# Patient Record
Sex: MALE | Race: WHITE | NOT HISPANIC OR LATINO | Employment: FULL TIME | URBAN - METROPOLITAN AREA
[De-identification: names, ages, dates, MRNs, and addresses within clinical notes are randomized per-mention and may not be internally consistent; named-entity substitution may affect disease eponyms.]

---

## 2017-01-09 ENCOUNTER — TRANSCRIBE ORDERS (OUTPATIENT)
Dept: ADMINISTRATIVE | Facility: HOSPITAL | Age: 62
End: 2017-01-09

## 2017-01-09 ENCOUNTER — HOSPITAL ENCOUNTER (OUTPATIENT)
Dept: RADIOLOGY | Facility: HOSPITAL | Age: 62
Discharge: HOME | End: 2017-01-09
Payer: COMMERCIAL

## 2017-01-09 DIAGNOSIS — J20.9 ACUTE BRONCHITIS, UNSPECIFIED ORGANISM: ICD-10-CM

## 2017-01-09 DIAGNOSIS — J20.9 ACUTE BRONCHITIS, UNSPECIFIED ORGANISM: Primary | ICD-10-CM

## 2017-01-09 PROCEDURE — 71020 HB CHEST X-RAY 2VW FRONTAL&LATL: CPT

## 2019-11-18 ENCOUNTER — APPOINTMENT (EMERGENCY)
Dept: CT IMAGING | Facility: HOSPITAL | Age: 64
DRG: 176 | End: 2019-11-18
Payer: COMMERCIAL

## 2019-11-18 ENCOUNTER — HOSPITAL ENCOUNTER (INPATIENT)
Facility: HOSPITAL | Age: 64
LOS: 1 days | Discharge: HOME | DRG: 176 | End: 2019-11-19
Attending: EMERGENCY MEDICINE | Admitting: INTERNAL MEDICINE
Payer: COMMERCIAL

## 2019-11-18 ENCOUNTER — APPOINTMENT (EMERGENCY)
Dept: RADIOLOGY | Facility: HOSPITAL | Age: 64
DRG: 176 | End: 2019-11-18
Payer: COMMERCIAL

## 2019-11-18 ENCOUNTER — APPOINTMENT (EMERGENCY)
Dept: ULTRASOUND IMAGING | Facility: HOSPITAL | Age: 64
DRG: 176 | End: 2019-11-18
Payer: COMMERCIAL

## 2019-11-18 DIAGNOSIS — I82.409 DVT (DEEP VENOUS THROMBOSIS) (HCC): Primary | ICD-10-CM

## 2019-11-18 DIAGNOSIS — I26.99 PULMONARY EMBOLISM (HCC): ICD-10-CM

## 2019-11-18 DIAGNOSIS — I26.99 ACUTE PULMONARY EMBOLISM (HCC): ICD-10-CM

## 2019-11-18 PROBLEM — I82.402 ACUTE DEEP VEIN THROMBOSIS (DVT) OF LEFT LOWER EXTREMITY (HCC): Status: ACTIVE | Noted: 2019-11-18

## 2019-11-18 PROBLEM — R07.9 CHEST PAIN: Status: ACTIVE | Noted: 2019-11-18

## 2019-11-18 PROBLEM — I10 HYPERTENSION: Status: ACTIVE | Noted: 2019-11-18

## 2019-11-18 PROBLEM — E78.5 HYPERLIPIDEMIA: Status: ACTIVE | Noted: 2019-11-18

## 2019-11-18 LAB
ALBUMIN SERPL BCP-MCNC: 3.3 G/DL (ref 3.5–5)
ALP SERPL-CCNC: 85 U/L (ref 46–116)
ALT SERPL W P-5'-P-CCNC: 29 U/L (ref 12–78)
ANION GAP SERPL CALCULATED.3IONS-SCNC: 9 MMOL/L (ref 4–13)
APTT PPP: 28 SECONDS (ref 23–37)
AST SERPL W P-5'-P-CCNC: 18 U/L (ref 5–45)
ATRIAL RATE: 75 BPM
BASOPHILS # BLD AUTO: 0.04 THOUSANDS/ΜL (ref 0–0.1)
BASOPHILS NFR BLD AUTO: 1 % (ref 0–1)
BILIRUB SERPL-MCNC: 0.2 MG/DL (ref 0.2–1)
BUN SERPL-MCNC: 19 MG/DL (ref 5–25)
CALCIUM SERPL-MCNC: 8.7 MG/DL (ref 8.3–10.1)
CHLORIDE SERPL-SCNC: 105 MMOL/L (ref 100–108)
CO2 SERPL-SCNC: 28 MMOL/L (ref 21–32)
CREAT SERPL-MCNC: 0.91 MG/DL (ref 0.6–1.3)
EOSINOPHIL # BLD AUTO: 0.16 THOUSAND/ΜL (ref 0–0.61)
EOSINOPHIL NFR BLD AUTO: 3 % (ref 0–6)
ERYTHROCYTE [DISTWIDTH] IN BLOOD BY AUTOMATED COUNT: 12.4 % (ref 11.6–15.1)
GFR SERPL CREATININE-BSD FRML MDRD: 91 ML/MIN/1.73SQ M
GLUCOSE SERPL-MCNC: 113 MG/DL (ref 65–140)
HCT VFR BLD AUTO: 45 % (ref 36.5–49.3)
HGB BLD-MCNC: 14.9 G/DL (ref 12–17)
IMM GRANULOCYTES # BLD AUTO: 0.02 THOUSAND/UL (ref 0–0.2)
IMM GRANULOCYTES NFR BLD AUTO: 0 % (ref 0–2)
INR PPP: 0.92 (ref 0.84–1.19)
LYMPHOCYTES # BLD AUTO: 1.68 THOUSANDS/ΜL (ref 0.6–4.47)
LYMPHOCYTES NFR BLD AUTO: 32 % (ref 14–44)
MCH RBC QN AUTO: 30.6 PG (ref 26.8–34.3)
MCHC RBC AUTO-ENTMCNC: 33.1 G/DL (ref 31.4–37.4)
MCV RBC AUTO: 92 FL (ref 82–98)
MONOCYTES # BLD AUTO: 0.58 THOUSAND/ΜL (ref 0.17–1.22)
MONOCYTES NFR BLD AUTO: 11 % (ref 4–12)
NEUTROPHILS # BLD AUTO: 2.74 THOUSANDS/ΜL (ref 1.85–7.62)
NEUTS SEG NFR BLD AUTO: 53 % (ref 43–75)
NRBC BLD AUTO-RTO: 0 /100 WBCS
P AXIS: 41 DEGREES
PLATELET # BLD AUTO: 162 THOUSANDS/UL (ref 149–390)
PMV BLD AUTO: 10.4 FL (ref 8.9–12.7)
POTASSIUM SERPL-SCNC: 4.2 MMOL/L (ref 3.5–5.3)
PR INTERVAL: 202 MS
PROT SERPL-MCNC: 6.8 G/DL (ref 6.4–8.2)
PROTHROMBIN TIME: 11.8 SECONDS (ref 11.6–14.5)
QRS AXIS: 35 DEGREES
QRSD INTERVAL: 90 MS
QT INTERVAL: 364 MS
QTC INTERVAL: 406 MS
RBC # BLD AUTO: 4.87 MILLION/UL (ref 3.88–5.62)
SODIUM SERPL-SCNC: 142 MMOL/L (ref 136–145)
T WAVE AXIS: 27 DEGREES
TROPONIN I SERPL-MCNC: <0.02 NG/ML
VENTRICULAR RATE: 75 BPM
WBC # BLD AUTO: 5.22 THOUSAND/UL (ref 4.31–10.16)

## 2019-11-18 PROCEDURE — 36415 COLL VENOUS BLD VENIPUNCTURE: CPT | Performed by: EMERGENCY MEDICINE

## 2019-11-18 PROCEDURE — 93970 EXTREMITY STUDY: CPT | Performed by: SURGERY

## 2019-11-18 PROCEDURE — 84484 ASSAY OF TROPONIN QUANT: CPT | Performed by: NURSE PRACTITIONER

## 2019-11-18 PROCEDURE — 71275 CT ANGIOGRAPHY CHEST: CPT

## 2019-11-18 PROCEDURE — 80053 COMPREHEN METABOLIC PANEL: CPT | Performed by: EMERGENCY MEDICINE

## 2019-11-18 PROCEDURE — 99285 EMERGENCY DEPT VISIT HI MDM: CPT

## 2019-11-18 PROCEDURE — 99223 1ST HOSP IP/OBS HIGH 75: CPT | Performed by: NURSE PRACTITIONER

## 2019-11-18 PROCEDURE — 71046 X-RAY EXAM CHEST 2 VIEWS: CPT

## 2019-11-18 PROCEDURE — 93010 ELECTROCARDIOGRAM REPORT: CPT | Performed by: INTERNAL MEDICINE

## 2019-11-18 PROCEDURE — 84484 ASSAY OF TROPONIN QUANT: CPT | Performed by: EMERGENCY MEDICINE

## 2019-11-18 PROCEDURE — 85730 THROMBOPLASTIN TIME PARTIAL: CPT | Performed by: EMERGENCY MEDICINE

## 2019-11-18 PROCEDURE — 85610 PROTHROMBIN TIME: CPT | Performed by: EMERGENCY MEDICINE

## 2019-11-18 PROCEDURE — 93005 ELECTROCARDIOGRAM TRACING: CPT

## 2019-11-18 PROCEDURE — 93970 EXTREMITY STUDY: CPT

## 2019-11-18 PROCEDURE — 99283 EMERGENCY DEPT VISIT LOW MDM: CPT | Performed by: EMERGENCY MEDICINE

## 2019-11-18 PROCEDURE — 85025 COMPLETE CBC W/AUTO DIFF WBC: CPT | Performed by: EMERGENCY MEDICINE

## 2019-11-18 RX ORDER — CHLORAL HYDRATE 500 MG
1000 CAPSULE ORAL DAILY
COMMUNITY

## 2019-11-18 RX ORDER — HEPARIN SODIUM 1000 [USP'U]/ML
6800 INJECTION, SOLUTION INTRAVENOUS; SUBCUTANEOUS ONCE
Status: COMPLETED | OUTPATIENT
Start: 2019-11-18 | End: 2019-11-18

## 2019-11-18 RX ORDER — ASPIRIN 325 MG
325 TABLET ORAL ONCE
Status: COMPLETED | OUTPATIENT
Start: 2019-11-18 | End: 2019-11-18

## 2019-11-18 RX ORDER — HEPARIN SODIUM 1000 [USP'U]/ML
6800 INJECTION, SOLUTION INTRAVENOUS; SUBCUTANEOUS AS NEEDED
Status: DISCONTINUED | OUTPATIENT
Start: 2019-11-18 | End: 2019-11-19

## 2019-11-18 RX ORDER — MUSCLE RUB CREAM 100; 150 MG/G; MG/G
CREAM TOPICAL 4 TIMES DAILY PRN
Status: DISCONTINUED | OUTPATIENT
Start: 2019-11-18 | End: 2019-11-19 | Stop reason: HOSPADM

## 2019-11-18 RX ORDER — ONDANSETRON 2 MG/ML
4 INJECTION INTRAMUSCULAR; INTRAVENOUS EVERY 6 HOURS PRN
Status: DISCONTINUED | OUTPATIENT
Start: 2019-11-18 | End: 2019-11-19 | Stop reason: HOSPADM

## 2019-11-18 RX ORDER — CHLORAL HYDRATE 500 MG
1000 CAPSULE ORAL DAILY
Status: DISCONTINUED | OUTPATIENT
Start: 2019-11-19 | End: 2019-11-19 | Stop reason: HOSPADM

## 2019-11-18 RX ORDER — HEPARIN SODIUM 1000 [USP'U]/ML
3400 INJECTION, SOLUTION INTRAVENOUS; SUBCUTANEOUS AS NEEDED
Status: DISCONTINUED | OUTPATIENT
Start: 2019-11-18 | End: 2019-11-19

## 2019-11-18 RX ORDER — ACETAMINOPHEN 325 MG/1
650 TABLET ORAL EVERY 6 HOURS PRN
Status: DISCONTINUED | OUTPATIENT
Start: 2019-11-18 | End: 2019-11-19 | Stop reason: HOSPADM

## 2019-11-18 RX ORDER — HEPARIN SODIUM 10000 [USP'U]/100ML
3-30 INJECTION, SOLUTION INTRAVENOUS
Status: DISCONTINUED | OUTPATIENT
Start: 2019-11-18 | End: 2019-11-19

## 2019-11-18 RX ADMIN — IOHEXOL 100 ML: 350 INJECTION, SOLUTION INTRAVENOUS at 17:27

## 2019-11-18 RX ADMIN — ASPIRIN 325 MG ORAL TABLET 325 MG: 325 PILL ORAL at 14:54

## 2019-11-18 RX ADMIN — HEPARIN SODIUM 18 UNITS/KG/HR: 10000 INJECTION, SOLUTION INTRAVENOUS at 19:38

## 2019-11-18 RX ADMIN — HEPARIN SODIUM 6800 UNITS: 1000 INJECTION INTRAVENOUS; SUBCUTANEOUS at 19:37

## 2019-11-18 NOTE — ED PROVIDER NOTES
History  Chief Complaint   Patient presents with    Chest Pain     Substernal chest pain starting last week  Denies radiation  Denies chest pain at  this time   Leg Pain     Bilateral leg pain for over a year  HPI     42-year-old male with history of hypertension and hyperlipidemia sent in by his PCP for substernal chest pain that started last week  Pain is described as substernal, burning pressure sensation  Occurs with exertion  Also accompanied by some dyspnea that also occurs with exertion, not present at rest   Denies abdominal pain  Chest pain radiates to the jaw and left arm  No personal early family history of CAD  Patient also reports pain in his bilateral calves that has been present for the last year  It is nonexertional, gets better with walking around, occasionally radiates the bilateral thighs  Also accompanied by swelling in his calves it is intermittent, comes and goes  He was briefly on a diuretic earlier in the year was taken off of it by his doctor  No history of DVT or PE  No recent prolonged immobilization  Prior to Admission Medications   Prescriptions Last Dose Informant Patient Reported? Taking? Omega-3 Fatty Acids (FISH OIL) 1,000 mg   Yes Yes   Sig: Take 1,000 mg by mouth daily      Facility-Administered Medications: None       History reviewed  No pertinent past medical history  Past Surgical History:   Procedure Laterality Date    ROTATOR CUFF REPAIR      SINUS SURGERY         History reviewed  No pertinent family history  I have reviewed and agree with the history as documented  Social History     Tobacco Use    Smoking status: Former Smoker    Smokeless tobacco: Never Used   Substance Use Topics    Alcohol use: Yes     Comment: rare    Drug use: Never        Review of Systems   Constitutional: Negative for chills and fever  HENT: Negative for congestion  Eyes: Negative for visual disturbance     Respiratory: Positive for shortness of breath (with exertion)  Negative for cough  Cardiovascular: Positive for chest pain and leg swelling  Gastrointestinal: Negative for abdominal pain, diarrhea, nausea and vomiting  Genitourinary: Negative for dysuria and frequency  Musculoskeletal: Positive for myalgias (bilateral legs)  Negative for arthralgias, back pain, neck pain and neck stiffness  Skin: Negative for rash  Neurological: Negative for weakness, numbness and headaches  Psychiatric/Behavioral: Negative for agitation, behavioral problems and confusion  Physical Exam  Physical Exam   Constitutional: He is oriented to person, place, and time  He appears well-developed and well-nourished  No distress  HENT:   Head: Normocephalic and atraumatic  Right Ear: External ear normal    Left Ear: External ear normal    Nose: Nose normal    Mouth/Throat: Oropharynx is clear and moist    Eyes: Conjunctivae are normal    Neck: Normal range of motion  Neck supple  Cardiovascular: Normal rate, regular rhythm, normal heart sounds and intact distal pulses  Exam reveals no gallop and no friction rub  No murmur heard  Pulmonary/Chest: Effort normal and breath sounds normal  No respiratory distress  He has no wheezes  He has no rales  Abdominal: Soft  Bowel sounds are normal  He exhibits no distension  There is no tenderness  There is no guarding  Musculoskeletal: Normal range of motion  He exhibits no edema (No calf swelling or swelling to the bilateral LEs) or deformity  TTP to the bilateral calves, no overlying skin changes   Neurological: He is alert and oriented to person, place, and time  He exhibits normal muscle tone  Skin: Skin is warm and dry  He is not diaphoretic         Vital Signs  ED Triage Vitals   Temperature Pulse Respirations Blood Pressure SpO2   11/18/19 1417 11/18/19 1416 11/18/19 1416 11/18/19 1416 11/18/19 1416   98 2 °F (36 8 °C) 73 18 153/76 98 %      Temp Source Heart Rate Source Patient Position - Orthostatic VS BP Location FiO2 (%)   11/18/19 1417 11/18/19 1416 11/18/19 1416 11/18/19 1416 --   Oral Monitor Sitting Right arm       Pain Score       11/18/19 1416       7           Vitals:    11/18/19 1712 11/18/19 1824 11/18/19 1900 11/18/19 2215   BP: 125/71 163/72 130/66 135/71   Pulse: 71 77 72 68   Patient Position - Orthostatic VS:   Lying Lying         Visual Acuity      ED Medications  Medications   heparin (porcine) 25,000 units in 250 mL infusion (premix) ( Intravenous Canceled Entry 11/18/19 1943)   heparin (porcine) injection 6,800 Units (has no administration in time range)   heparin (porcine) injection 3,400 Units (has no administration in time range)   fish oil capsule 1,000 mg (has no administration in time range)   ondansetron (ZOFRAN) injection 4 mg (has no administration in time range)   acetaminophen (TYLENOL) tablet 650 mg (has no administration in time range)   menthol-methyl salicylate (BENGAY) 94-73 % cream (has no administration in time range)   aspirin tablet 325 mg (325 mg Oral Given 11/18/19 1454)   iohexol (OMNIPAQUE) 350 MG/ML injection (MULTI-DOSE) 100 mL (100 mL Intravenous Given 11/18/19 1727)   heparin (porcine) injection 6,800 Units (6,800 Units Intravenous Given 11/18/19 1937)       Diagnostic Studies  Results Reviewed     Procedure Component Value Units Date/Time    APTT [[id number]]  (Normal) Collected:  11/18/19 1827    Lab Status:  Final result Specimen:  Blood from Arm, Right Updated:  11/18/19 1853     PTT 28 seconds     Protime-INR [[id number]]  (Normal) Collected:  11/18/19 1827    Lab Status:  Final result Specimen:  Blood from Arm, Right Updated:  11/18/19 1853     Protime 11 8 seconds      INR 0 92    Troponin I [[id number]]  (Normal) Collected:  11/18/19 1749    Lab Status:  Final result Specimen:  Blood from Arm, Left Updated:  11/18/19 1815     Troponin I <0 02 ng/mL     Troponin I [[id number]]  (Normal) Collected:  11/18/19 1502    Lab Status:  Final result Specimen:  Blood from Arm, Right Updated:  11/18/19 1533     Troponin I <0 02 ng/mL     Comprehensive metabolic panel [[id number]]  (Abnormal) Collected:  11/18/19 1502    Lab Status:  Final result Specimen:  Blood from Arm, Right Updated:  11/18/19 1531     Sodium 142 mmol/L      Potassium 4 2 mmol/L      Chloride 105 mmol/L      CO2 28 mmol/L      ANION GAP 9 mmol/L      BUN 19 mg/dL      Creatinine 0 91 mg/dL      Glucose 113 mg/dL      Calcium 8 7 mg/dL      AST 18 U/L      ALT 29 U/L      Alkaline Phosphatase 85 U/L      Total Protein 6 8 g/dL      Albumin 3 3 g/dL      Total Bilirubin 0 20 mg/dL      eGFR 91 ml/min/1 73sq m     Narrative:       [place] guidelines for Chronic Kidney Disease (CKD):     Stage 1 with normal or high GFR (GFR > 90 mL/min/1 73 square meters)    Stage 2 Mild CKD (GFR = 60-89 mL/min/1 73 square meters)    Stage 3A Moderate CKD (GFR = 45-59 mL/min/1 73 square meters)    Stage 3B Moderate CKD (GFR = 30-44 mL/min/1 73 square meters)    Stage 4 Severe CKD (GFR = 15-29 mL/min/1 73 square meters)    Stage 5 End Stage CKD (GFR <15 mL/min/1 73 square meters)  Note: GFR calculation is accurate only with a steady state creatinine    CBC and differential [[id number]] Collected:  11/18/19 1502    Lab Status:  Final result Specimen:  Blood from Arm, Right Updated:  11/18/19 1509     WBC 5 22 Thousand/uL      RBC 4 87 Million/uL      Hemoglobin 14 9 g/dL      Hematocrit 45 0 %      MCV 92 fL      MCH 30 6 pg      MCHC 33 1 g/dL      RDW 12 4 %      MPV 10 4 fL      Platelets 004 Thousands/uL      nRBC 0 /100 WBCs      Neutrophils Relative 53 %      Immat GRANS % 0 %      Lymphocytes Relative 32 %      Monocytes Relative 11 %      Eosinophils Relative 3 %      Basophils Relative 1 %      Neutrophils Absolute 2 74 Thousands/µL      Immature Grans Absolute 0 02 Thousand/uL      Lymphocytes Absolute 1 68 Thousands/µL      Monocytes Absolute 0 58 Thousand/µL      Eosinophils Absolute 0 16 Thousand/µL Basophils Absolute 0 04 Thousands/µL                  CTA ED chest PE Study   Final Result by Dennys Cummins MD (11/18 9794)      Nonocclusive pulmonary emboli identified within right lower lobe and lingular segmental branches  No findings of right heart strain  I personally discussed this study with Nguyen Bowman on 11/18/2019 at 5:54 PM                Workstation performed: [id number]         VAS lower limb venous duplex study, complete bilateral   Final Result by Rajiv Leroy MD (11/18 1938)      XR chest 2 views   ED Interpretation by Grant Bowers MD (11/18 1516)   No acute cardiopulmonary abnormalities      Final Result by Kranthi Crowe MD (11/18 1541)      No acute cardiopulmonary disease  Workstation performed: [id number]                    Procedures  CriticalCare Time  Performed by: Grant Bowers MD  Authorized by: Grant Bowers MD     Critical care provider statement:     Critical care time (minutes):  35    Critical care was necessary to treat or prevent imminent or life-threatening deterioration of the following conditions: PE requiring heparin drip      Critical care was time spent personally by me on the following activities:  Ordering and review of laboratory studies, ordering and review of radiographic studies, re-evaluation of patient's condition, examination of patient, development of treatment plan with patient or surrogate and ordering and performing treatments and interventions           ED Course                         Wells' Criteria for PE      Most Recent Value   Wells' Criteria for PE   Clinical signs and symptoms of DVT  3 Filed at: 11/18/2019 1620   PE is primary diagnosis or equally likely  3 Filed at: 11/18/2019 1620   HR >100  0 Filed at: 11/18/2019 1620   Immobilization at least 3 days or Surgery in the previous 4 weeks  0 Filed at: 11/18/2019 1620   Previous, objectively diagnosed PE or DVT  1 5 Filed at: 11/18/2019 1620   Hemoptysis  0 Filed at: 11/18/2019 1620   Malignancy with treatment within 6 months or palliative  0 Filed at: 11/18/2019 1620   Wells' Criteria Total  7 5 Filed at: 11/18/2019 1620            [facility]  Number of Diagnoses or Management Options  DVT (deep venous thrombosis) [facility]): new and requires workup  Pulmonary embolism [facility]): new and requires workup  Diagnosis management comments: Generally well appearing  Afebrile and hemodynamically stable  I personally interpreted the patient's EKG, which reveals normal rate, normal sinus rhythm, normal axis, normal intervals, T-wave abnormality in lead 3 with no prior available for comparison  Initial troponin undetectable  Medium risk by HEARTscore  Patient was found to have a DVT to his left calf, at which point CTA of the chest was obtained that shows bilateral nonocclusive subsegmental PEs without evidence of right heart strain  Will start on heparin and admit for chest pain that is now most likely secondary to pulmonary embolism  Patient admitted in stable condition  DVT and PEs appear to be unprovoked         Amount and/or Complexity of Data Reviewed  Clinical lab tests: ordered and reviewed  Tests in the radiology section of CPT®: ordered and reviewed  Independent visualization of images, tracings, or specimens: yes    Patient Progress  Patient progress: stable         Disposition  Final diagnoses:   DVT (deep venous thrombosis) ([facility] [address] )   Pulmonary embolism ([facility] [address] )     Time reflects when diagnosis was documented in both MDM as applicable and the Disposition within this note     Time User Action Codes Description Comment    11/18/2019  6:08 PM Derinda Furth Add [O22 30] DVT (deep vein thrombosis) in pregnancy     11/18/2019  6:08 PM Derinda Furth Remove [O22 30] DVT (deep vein thrombosis) in pregnancy     11/18/2019  6:08 PM Derinda Furth Add [I82 409] DVT (deep venous thrombosis) ([facility] [address] )     11/18/2019  6:08 PM Derinda Furth Add [I26 99] Pulmonary embolism [facility])       ED Disposition     ED Disposition Condition Date/Time Comment    Admit Stable Mon Nov 18, 2019  6:08 PM Case was discussed with EDU and the patient's admission status was agreed to be Admission Status: inpatient status to the service of Dr Charlette Coulter  Follow-up Information    None         Current Discharge Medication List      CONTINUE these medications which have NOT CHANGED    Details   Omega-3 Fatty Acids (FISH OIL) 1,000 mg Take 1,000 mg by mouth daily           No discharge procedures on file      ED Provider  Electronically Signed by           Drew Zhong MD  11/18/19 9427

## 2019-11-19 VITALS
TEMPERATURE: 98.3 F | WEIGHT: 184 LBS | BODY MASS INDEX: 27.25 KG/M2 | HEIGHT: 69 IN | SYSTOLIC BLOOD PRESSURE: 134 MMHG | RESPIRATION RATE: 18 BRPM | DIASTOLIC BLOOD PRESSURE: 62 MMHG | HEART RATE: 72 BPM | OXYGEN SATURATION: 95 %

## 2019-11-19 PROBLEM — R07.9 CHEST PAIN: Status: RESOLVED | Noted: 2019-11-18 | Resolved: 2019-11-19

## 2019-11-19 LAB
ANION GAP SERPL CALCULATED.3IONS-SCNC: 9 MMOL/L (ref 4–13)
APTT PPP: 144 SECONDS (ref 23–37)
APTT PPP: 73 SECONDS (ref 23–37)
APTT PPP: 78 SECONDS (ref 23–37)
BASOPHILS # BLD AUTO: 0.04 THOUSANDS/ΜL (ref 0–0.1)
BASOPHILS NFR BLD AUTO: 1 % (ref 0–1)
BUN SERPL-MCNC: 18 MG/DL (ref 5–25)
CALCIUM SERPL-MCNC: 9 MG/DL (ref 8.3–10.1)
CHLORIDE SERPL-SCNC: 106 MMOL/L (ref 100–108)
CO2 SERPL-SCNC: 26 MMOL/L (ref 21–32)
CREAT SERPL-MCNC: 0.96 MG/DL (ref 0.6–1.3)
EOSINOPHIL # BLD AUTO: 0.19 THOUSAND/ΜL (ref 0–0.61)
EOSINOPHIL NFR BLD AUTO: 3 % (ref 0–6)
ERYTHROCYTE [DISTWIDTH] IN BLOOD BY AUTOMATED COUNT: 12.4 % (ref 11.6–15.1)
GFR SERPL CREATININE-BSD FRML MDRD: 85 ML/MIN/1.73SQ M
GLUCOSE SERPL-MCNC: 102 MG/DL (ref 65–140)
HCT VFR BLD AUTO: 43.2 % (ref 36.5–49.3)
HGB BLD-MCNC: 14.2 G/DL (ref 12–17)
IMM GRANULOCYTES # BLD AUTO: 0.03 THOUSAND/UL (ref 0–0.2)
IMM GRANULOCYTES NFR BLD AUTO: 1 % (ref 0–2)
LYMPHOCYTES # BLD AUTO: 2.48 THOUSANDS/ΜL (ref 0.6–4.47)
LYMPHOCYTES NFR BLD AUTO: 41 % (ref 14–44)
MCH RBC QN AUTO: 30.5 PG (ref 26.8–34.3)
MCHC RBC AUTO-ENTMCNC: 32.9 G/DL (ref 31.4–37.4)
MCV RBC AUTO: 93 FL (ref 82–98)
MONOCYTES # BLD AUTO: 0.67 THOUSAND/ΜL (ref 0.17–1.22)
MONOCYTES NFR BLD AUTO: 11 % (ref 4–12)
NEUTROPHILS # BLD AUTO: 2.68 THOUSANDS/ΜL (ref 1.85–7.62)
NEUTS SEG NFR BLD AUTO: 43 % (ref 43–75)
NRBC BLD AUTO-RTO: 0 /100 WBCS
PLATELET # BLD AUTO: 143 THOUSANDS/UL (ref 149–390)
PMV BLD AUTO: 10.3 FL (ref 8.9–12.7)
POTASSIUM SERPL-SCNC: 3.9 MMOL/L (ref 3.5–5.3)
RBC # BLD AUTO: 4.65 MILLION/UL (ref 3.88–5.62)
SODIUM SERPL-SCNC: 141 MMOL/L (ref 136–145)
WBC # BLD AUTO: 6.09 THOUSAND/UL (ref 4.31–10.16)

## 2019-11-19 PROCEDURE — 85730 THROMBOPLASTIN TIME PARTIAL: CPT | Performed by: INTERNAL MEDICINE

## 2019-11-19 PROCEDURE — 80048 BASIC METABOLIC PNL TOTAL CA: CPT | Performed by: INTERNAL MEDICINE

## 2019-11-19 PROCEDURE — 99239 HOSP IP/OBS DSCHRG MGMT >30: CPT | Performed by: INTERNAL MEDICINE

## 2019-11-19 PROCEDURE — 85025 COMPLETE CBC W/AUTO DIFF WBC: CPT | Performed by: INTERNAL MEDICINE

## 2019-11-19 RX ADMIN — HEPARIN SODIUM 15.06 UNITS/KG/HR: 10000 INJECTION, SOLUTION INTRAVENOUS at 15:57

## 2019-11-19 RX ADMIN — Medication 1000 MG: at 08:12

## 2019-11-19 RX ADMIN — RIVAROXABAN 15 MG: 15 TABLET, FILM COATED ORAL at 16:16

## 2019-11-19 NOTE — ASSESSMENT & PLAN NOTE
· Patient presented to the ER from outpatient PCP office due to complaints of bilateral leg pain, substernal chest pain, and shortness of breath  Patient reports that he has had bilateral leg pain for about 1 year  Additionally, he reports complaints of intermittent chest pain that got increasingly worse this past week  He denies any radiation of the chest pain  He reports shortness of breath episodes intermittently, and reports that his dyspnea is worse with exertion  He reports a history of high blood pressure and elevated cholesterol, however, he is controlling them with lifestyle changes and fish oil daily  He denies any recent surgeries or procedures  He reports that he is a former cigarette smoker, reports quitting 15 years ago  He denies any family history of clotting disorders  · CTA chest PE study:  "Nonocclusive pulmonary emboli identified within the right lower lobe and lingular segmental branches  No findings of right heart strain "  · Continue IV heparin  · Plan to transition from IV heparin to oral anticoagulant  · Appreciate case management consult for insurance approval of oral anticoagulant agent  · Monitor on telemetry  · Continue to monitor

## 2019-11-19 NOTE — UTILIZATION REVIEW
Initial Clinical Review    Admission: Date/Time/Statement: Inpatient Admission Orders (From admission, onward)     Ordered        11/18/19 1809  Inpatient Admission (expected length of stay for this patient Order details is greater than two midnights)  Once                   Orders Placed This Encounter   Procedures    Inpatient Admission (expected length of stay for this patient Order details is greater than two midnights)     Standing Status:   Standing     Number of Occurrences:   1     Order Specific Question:   Admitting Physician     Answer:   Yolis Hernandez [[id number]]     Order Specific Question:   Level of Care     Answer:   Med Surg [[id number]]     Order Specific Question:   Estimated length of stay     Answer:   More than 2 Midnights     Order Specific Question:   Certification     Answer:   I certify that inpatient services are medically necessary for this patient for a duration of greater than two midnights  See H&P and MD Progress Notes for additional information about the patient's course of treatment  ED Arrival Information     Expected Arrival Acuity Means of Arrival Escorted By Service Admission Type    - 11/18/2019 14:06 Urgent Walk-In Spouse Hospitalist Urgent    Arrival Complaint    Chest/Leg Pain        Chief Complaint   Patient presents with    Chest Pain     Substernal chest pain starting last week  Denies radiation  Denies chest pain at  this time   Leg Pain     Bilateral leg pain for over a year  Assessment/Plan:  67 yo male with Hx of HTN, HLD sent to ED from PCP office for substernal chest pain that started last week, Occurs with exertion & accompanied by some dyspnea that also occurs with exertion, not present at rest  Chest pain radiates to the jaw and left arm  Patient also reports pain in his bilateral calves that has been present for the last year  TTP to the bilateral calves   Patient was found to have a DVT to his left calf, at which point CTA of the chest was obtained that shows bilateral nonocclusive subsegmental PEs without evidence of right heart strain  Admitted to IP with Acute Pulmonary Embolism and Acute DVT LLE   Plan: Monitor on Tele,Heparin Drip,    ED Triage Vitals   Temperature Pulse Respirations Blood Pressure SpO2   11/18/19 1417 11/18/19 1416 11/18/19 1416 11/18/19 1416 11/18/19 1416   98 2 °F (36 8 °C) 73 18 153/76 98 %      Temp Source Heart Rate Source Patient Position - Orthostatic VS BP Location FiO2 (%)   11/18/19 1417 11/18/19 1416 11/18/19 1416 11/18/19 1416 --   Oral Monitor Sitting Right arm       Pain Score       11/18/19 1416       7        Wt Readings from Last 1 Encounters:   11/18/19 83 5 kg (184 lb)     Additional Vital Signs:   11/19/19 0700  97 7 °F (36 5 °C) 66 18 141/73 92 % None (Room air) Lying   11/18/19 2215  98 °F (36 7 °C) 68 18 135/71 96 % None (Room air) Lying   11/18/19 1900  97 9 °F (36 6 °C) 72 18 130/66 94 % None (Room air) Lying   11/18/19 1824   77 14 163/72 97 % None (Room air)    11/18/19 1712   71 14 125/71 98 % None (Room air)        Pertinent Labs/Diagnostic Test Results:   Results from last 7 days   Lab Units 11/19/19  0149 11/18/19  1502   WBC Thousand/uL 6 09 5 22   HEMOGLOBIN g/dL 14 2 14 9   HEMATOCRIT % 43 2 45 0   PLATELETS Thousands/uL 143* 162   NEUTROS ABS Thousands/µL 2 68 2 74     Results from last 7 days   Lab Units 11/19/19  0149 11/18/19  1502   SODIUM mmol/L 141 142   POTASSIUM mmol/L 3 9 4 2   CHLORIDE mmol/L 106 105   CO2 mmol/L 26 28   ANION GAP mmol/L 9 9   BUN mg/dL 18 19   CREATININE mg/dL 0 96 0 91   EGFR ml/min/1 73sq m 85 91   CALCIUM mg/dL 9 0 8 7     Results from last 7 days   Lab Units 11/18/19  1502   AST U/L 18   ALT U/L 29   ALK PHOS U/L 85   TOTAL PROTEIN g/dL 6 8   ALBUMIN g/dL 3 3*   TOTAL BILIRUBIN mg/dL 0 20     Results from last 7 days   Lab Units 11/19/19  0149 11/18/19  1502   GLUCOSE RANDOM mg/dL 102 113     Results from last 7 days   Lab Units 11/18/19  2135 11/18/19  6196 11/18/19  1502   TROPONIN I ng/mL <0 02 <0 02 <0 02     Results from last 7 days   Lab Units 11/19/19  0942 11/19/19  0149 11/18/19  1827   PROTIME seconds  --   --  11 8   INR   --   --  0 92   PTT seconds 78* 144* 28     11/18:  Bilat LE Duplex: LLE: acute occlusive deep vein thrombosis in the peroneal veins  RLE - negative    11/18 CTA Chest: Nonocclusive pulmonary emboli identified within right lower lobe and lingular segmental branches   No findings of right heart strain  11/18 EKG: NSR  11/18 CXR: No acute cardiopulmonary disease    ED Treatment:   Medication Administration from 11/18/2019 1406 to 11/18/2019 1841       Date/Time Order Dose Route Action     11/18/2019 1454 aspirin tablet 325 mg 325 mg Oral Given        History reviewed  No pertinent past medical history  Admitting Diagnosis: Chest pain [R07 9]  Pulmonary embolism (HCC) [I26 99]  DVT (deep venous thrombosis) (HCC) [I82 409]  Age/Sex: 66 y o  male     Admission Orders:  Scheduled Medications:  Medications:  fish oil 1,000 mg Oral Daily     Continuous IV Infusions:  heparin (porcine) 3-30 Units/kg/hr (Order-Specific) Intravenous Titrated     PRN Meds:  acetaminophen 650 mg Oral Q6H PRN   heparin (porcine) 3,400 Units Intravenous PRN   heparin (porcine) 6,800 Units Intravenous PRN   menthol-methyl salicylate  Apply externally 4x Daily PRN   ondansetron 4 mg Intravenous Q6H PRN     TELE  SCD's  IP CONSULT TO CASE MANAGEMENT      Network Utilization Review Department  [email or web address]  ATTENTION: Please call with any questions or concerns to [phone] and carefully listen to the prompts so that you are directed to the right person  All voicemails are confidential   [employer] all requests for admission clinical reviews, approved or denied determinations and any other requests to dedicated fax number below belonging to the campus where the patient is receiving treatment    FACILITY NAME UR FAX NUMBER   ADMISSION DENIALS (Administrative/Medical Necessity) [id number] [facility] (Maternity/NICU/Pediatrics) [place] [phone]   Luann Dyson [phone]   Thom Higginbotham [phone]   Abigail Brown [place] [id number] [facility] [phone]   [address] [phone]

## 2019-11-19 NOTE — H&P
[address] Internal [facility]    H&P- Jada Comment 1955, 66 y o  male MRN: [id number]    Unit/Bed#: S -01 Encounter: [id number]    Primary Care Provider: Irish Higuera MD   Date and time admitted to hospital: 11/18/2019  2:12 PM        * Acute pulmonary embolism [facility])  Assessment & Plan  · Patient presented to the ER from outpatient PCP office due to complaints of bilateral leg pain, substernal chest pain, and shortness of breath  · CTA chest PE study:  "Nonocclusive pulmonary emboli identified within the right lower lobe and lingular segmental branches  No findings of right heart strain "  · Continue IV heparin  · Plan to transition from IV heparin to oral anticoagulant  · Appreciate case management consult for insurance approval of oral anticoagulant agent  · Monitor on telemetry  · Continue to monitor  Acute deep vein thrombosis (DVT) of left lower extremity (HCC)  Assessment & Plan  · Patient reports intermittent bilateral leg pain for over 1 year  · VAS lower limb venous duplex:  Left lower limb with an acute occlusive deep vein thrombosis in the peroneal veins  Right lower limb with no evidence of acute or chronic DVT  · Continue IV heparin  · See plan above  Chest pain  Assessment & Plan  · Patient reports intermittent substernal chest pain, with no radiation, started about 1 year ago and increasingly worse in the last week  · Likely in the setting of an acute PE   · Patient currently denies chest pain at this time  · First troponin levels negative x2, will repeat 1 more  · Continue to monitor telemetry  Hypertension  Assessment & Plan  · Blood pressures currently in the 130/70s  · Patient reports he does not take any antihypertensive medications at this time  He is controlling his blood pressure with low salt diet and lifestyle modifications  · Continue to monitor  Hyperlipidemia  Assessment & Plan  · Continue Fish oil daily        VTE Prophylaxis: Heparin Drip  / sequential compression device right lower extremity only  Code Status: Full  POLST: POLST form is not discussed and not completed at this time  Discussion with family: Wife at bedside    Anticipated Length of Stay:  Patient will be admitted on an Inpatient basis with an anticipated length of stay of  > 2 midnights  Justification for Hospital Stay: IV heparin, telemetry monitoring    Total Time for Visit, including Counseling / Coordination of Care: 1 hour  Greater than 50% of this total time spent on direct patient counseling and coordination of care  Chief Complaint:   Chest Pain, bilateral leg pain, shortness of breath    History of Present Illness:    Ben Woodward is a 66 y o  male with a past medical history of hypertension and hyperlipidemia who presents with chest pain, bilateral leg pain, and shortness of breath  Patient presented to the ER from outpatient PCP office due to complaints of bilateral leg pain, substernal patient reports that he had bilateral leg pain for about 1 year  Additionally, he reports complaints of intermittent chest pain that got increasingly worse this past week  He denies any radiation of the chest pain  He denies nausea or vomiting  He reports shortness of breath episodes intermittently, and reports that his dyspnea is worse with exertion  He currently reports that he has no chest pain at this time  He reports left lower extremity pain being 2/10  He reports a history of high blood pressure and elevated cholesterol, however, he is controlling them with lifestyle changes, diet and fish oil daily  He denies any recent surgeries or procedures  He reports that he is a former cigarette smoker, reports quitting 15 years ago  He denies any family history of clotting disorders  Review of Systems:    Review of Systems   Constitutional: Negative for activity change and fatigue  Respiratory: Positive for chest tightness and shortness of breath   Negative for cough and wheezing  Cardiovascular: Positive for chest pain and palpitations  Gastrointestinal: Negative for abdominal pain, constipation, diarrhea, nausea and vomiting  Genitourinary: Negative for difficulty urinating and dysuria  Musculoskeletal: Positive for myalgias  Negative for arthralgias and back pain  Skin: Negative for color change  Neurological: Negative for dizziness, syncope, weakness and light-headedness  All other systems reviewed and are negative  Past Medical and Surgical History:     History reviewed  No pertinent past medical history  Past Surgical History:   Procedure Laterality Date    ROTATOR CUFF REPAIR      SINUS SURGERY         Meds/Allergies:    Prior to Admission medications    Medication Sig Start Date End Date Taking? Authorizing Provider   Omega-3 Fatty Acids (FISH OIL) 1,000 mg Take 1,000 mg by mouth daily   Yes Historical Provider, MD     I have reviewed home medications with patient personally  Allergies: No Known Allergies    Social History:     Marital Status: /Civil Union   Occupation:  Maintenance/  Patient Pre-hospital Living Situation:  Private residence with wife  Patient Pre-hospital Level of Mobility:  Independent  Patient Pre-hospital Diet Restrictions:  None  Substance Use History:  None    Social History     Substance and Sexual Activity   Alcohol Use Yes    Comment: rare     Social History     Tobacco Use   Smoking Status Former Smoker   Smokeless Tobacco Never Used     Social History     Substance and Sexual Activity   Drug Use Never       Family History:    non-contributory    Physical Exam:     Vitals:   Blood Pressure: 135/71 (11/18/19 2215)  Pulse: 68 (11/18/19 2215)  Temperature: 98 °F (36 7 °C) (11/18/19 2215)  Temp Source: Oral (11/18/19 2215)  Respirations: 18 (11/18/19 2215)  Height: 5' 9" (175 3 cm) (11/18/19 1900)  Weight - Scale: 83 5 kg (184 lb) (11/18/19 1900)  SpO2: 96 % (11/18/19 2215)    Physical Exam Constitutional: He is oriented to person, place, and time  He appears well-developed and well-nourished  No distress  HENT:   Head: Normocephalic and atraumatic  Nose: Nose normal    Eyes: Pupils are equal, round, and reactive to light  Conjunctivae and EOM are normal    Neck: Normal range of motion  Neck supple  Cardiovascular: Normal rate, regular rhythm, normal heart sounds and intact distal pulses  Exam reveals no gallop and no friction rub  No murmur heard  Pulmonary/Chest: Effort normal and breath sounds normal  No respiratory distress  He has no wheezes  He has no rales  He exhibits no tenderness  Abdominal: Soft  Bowel sounds are normal  He exhibits no distension  There is no tenderness  Musculoskeletal: Normal range of motion  He exhibits no edema  Left lower leg: He exhibits tenderness  He exhibits no swelling  Neurological: He is alert and oriented to person, place, and time  He has normal strength  He is not disoriented  Skin: Skin is warm, dry and intact  No ecchymosis noted  He is not diaphoretic  No erythema  No pallor  Psychiatric: He has a normal mood and affect  His speech is normal and behavior is normal  Judgment and thought content normal  Cognition and memory are normal    Nursing note and vitals reviewed  Additional Data:     Lab Results: I have personally reviewed pertinent reports        Results from last 7 days   Lab Units 11/18/19  1502   WBC Thousand/uL 5 22   HEMOGLOBIN g/dL 14 9   HEMATOCRIT % 45 0   PLATELETS Thousands/uL 162   NEUTROS PCT % 53   LYMPHS PCT % 32   MONOS PCT % 11   EOS PCT % 3     Results from last 7 days   Lab Units 11/18/19  1502   SODIUM mmol/L 142   POTASSIUM mmol/L 4 2   CHLORIDE mmol/L 105   CO2 mmol/L 28   BUN mg/dL 19   CREATININE mg/dL 0 91   ANION GAP mmol/L 9   CALCIUM mg/dL 8 7   ALBUMIN g/dL 3 3*   TOTAL BILIRUBIN mg/dL 0 20   ALK PHOS U/L 85   ALT U/L 29   AST U/L 18   GLUCOSE RANDOM mg/dL 113     Results from last 7 days Lab Units 11/18/19  1827   INR  0 92                   Imaging: I have personally reviewed pertinent reports  CTA ED chest PE Study   Final Result by Romana Galloway, MD (11/18 7334)      Nonocclusive pulmonary emboli identified within right lower lobe and lingular segmental branches  No findings of right heart strain  I personally discussed this study with Maria Isabel Davidson on 11/18/2019 at 5:54 PM                Workstation performed: [id number]         VAS lower limb venous duplex study, complete bilateral   Final Result by Krishna Winters MD (11/18 1938)      XR chest 2 views   ED Interpretation by Vicky Figueroa MD (11/18 1516)   No acute cardiopulmonary abnormalities      Final Result by Debbie Frank MD (11/18 0249)      No acute cardiopulmonary disease  Workstation performed: [id number]             EKG, Pathology, and Other Studies Reviewed on Admission:   · EKG: Normal sinus rhythm, heart rate 75  Allscripts / Epic Records Reviewed: Yes     ** Please Note: This note has been constructed using a voice recognition system   **

## 2019-11-19 NOTE — PLAN OF CARE
Problem: PAIN - ADULT  Goal: Verbalizes/displays adequate comfort level or baseline comfort level  Description  Interventions:  - Encourage patient to monitor pain and request assistance  - Assess pain using appropriate pain scale  - Administer analgesics based on type and severity of pain and evaluate response  - Implement non-pharmacological measures as appropriate and evaluate response  - Consider cultural and social influences on pain and pain management  - Notify physician/advanced practitioner if interventions unsuccessful or patient reports new pain  Outcome: Progressing     Problem: SAFETY ADULT  Goal: Patient will remain free of falls  Description  INTERVENTIONS:  - Assess patient frequently for physical needs  -  Identify cognitive and physical deficits and behaviors that affect risk of falls    -  [place] fall precautions as indicated by assessment   - Educate patient/family on patient safety including physical limitations  - Instruct patient to call for assistance with activity based on assessment  - Modify environment to reduce risk of injury  - Consider OT/PT consult to assist with strengthening/mobility  Outcome: Progressing  Goal: Maintain or return to baseline ADL function  Description  INTERVENTIONS:  -  Assess patient's ability to carry out ADLs; assess patient's baseline for ADL function and identify physical deficits which impact ability to perform ADLs (bathing, care of mouth/teeth, toileting, grooming, dressing, etc )  - Assess/evaluate cause of self-care deficits   - Assess range of motion  - Assess patient's mobility; develop plan if impaired  - Assess patient's need for assistive devices and provide as appropriate  - Encourage maximum independence but intervene and supervise when necessary  - Involve family in performance of ADLs  - Assess for home care needs following discharge   - Consider OT consult to assist with ADL evaluation and planning for discharge  - Provide patient education as appropriate  Outcome: Progressing  Goal: Maintain or return mobility status to optimal level  Description  INTERVENTIONS:  - Assess patient's baseline mobility status (ambulation, transfers, stairs, etc )    - Identify cognitive and physical deficits and behaviors that affect mobility  - Identify mobility aids required to assist with transfers and/or ambulation (gait belt, sit-to-stand, lift, walker, cane, etc )  - [place] fall precautions as indicated by assessment  - Record patient progress and toleration of activity level on Mobility SBAR; progress patient to next Phase/Stage  - Instruct patient to call for assistance with activity based on assessment  - Consider rehabilitation consult to assist with strengthening/weightbearing, etc   Outcome: Progressing     Problem: DISCHARGE PLANNING  Goal: Discharge to home or other facility with appropriate resources  Description  INTERVENTIONS:  - Identify barriers to discharge w/patient and caregiver  - Arrange for needed discharge resources and transportation as appropriate  - Identify discharge learning needs (meds, wound care, etc )  - Arrange for interpretive services to assist at discharge as needed  - Refer to Case Management Department for coordinating discharge planning if the patient needs post-hospital services based on physician/advanced practitioner order or complex needs related to functional status, cognitive ability, or social support system  Outcome: Progressing     Problem: Knowledge Deficit  Goal: Patient/family/caregiver demonstrates understanding of disease process, treatment plan, medications, and discharge instructions  Description  Complete learning assessment and assess knowledge base    Interventions:  - Provide teaching at level of understanding  - Provide teaching via preferred learning methods  Outcome: Progressing

## 2019-11-19 NOTE — ASSESSMENT & PLAN NOTE
· Patient reports intermittent substernal chest pain, with no radiation, started about 1 year ago and increasingly worse in the last week  · Likely in the setting of an acute PE   · Patient currently denies chest pain at this time  · First troponin levels negative x2, will repeat 1 more  · Continue to monitor telemetry

## 2019-11-19 NOTE — PLAN OF CARE
Problem: PAIN - ADULT  Goal: Verbalizes/displays adequate comfort level or baseline comfort level  Description  Interventions:  - Encourage patient to monitor pain and request assistance  - Assess pain using appropriate pain scale  - Administer analgesics based on type and severity of pain and evaluate response  - Implement non-pharmacological measures as appropriate and evaluate response  - Consider cultural and social influences on pain and pain management  - Notify physician/advanced practitioner if interventions unsuccessful or patient reports new pain  Outcome: Progressing     Problem: SAFETY ADULT  Goal: Patient will remain free of falls  Description  INTERVENTIONS:  - Assess patient frequently for physical needs  -  Identify cognitive and physical deficits and behaviors that affect risk of falls    -  [place] fall precautions as indicated by assessment   - Educate patient/family on patient safety including physical limitations  - Instruct patient to call for assistance with activity based on assessment  - Modify environment to reduce risk of injury  - Consider OT/PT consult to assist with strengthening/mobility  Outcome: Progressing  Goal: Maintain or return to baseline ADL function  Description  INTERVENTIONS:  -  Assess patient's ability to carry out ADLs; assess patient's baseline for ADL function and identify physical deficits which impact ability to perform ADLs (bathing, care of mouth/teeth, toileting, grooming, dressing, etc )  - Assess/evaluate cause of self-care deficits   - Assess range of motion  - Assess patient's mobility; develop plan if impaired  - Assess patient's need for assistive devices and provide as appropriate  - Encourage maximum independence but intervene and supervise when necessary  - Involve family in performance of ADLs  - Assess for home care needs following discharge   - Consider OT consult to assist with ADL evaluation and planning for discharge  - Provide patient education as appropriate  Outcome: Progressing  Goal: Maintain or return mobility status to optimal level  Description  INTERVENTIONS:  - Assess patient's baseline mobility status (ambulation, transfers, stairs, etc )    - Identify cognitive and physical deficits and behaviors that affect mobility  - Identify mobility aids required to assist with transfers and/or ambulation (gait belt, sit-to-stand, lift, walker, cane, etc )  - [place] fall precautions as indicated by assessment  - Record patient progress and toleration of activity level on Mobility SBAR; progress patient to next Phase/Stage  - Instruct patient to call for assistance with activity based on assessment  - Consider rehabilitation consult to assist with strengthening/weightbearing, etc   Outcome: Progressing     Problem: DISCHARGE PLANNING  Goal: Discharge to home or other facility with appropriate resources  Description  INTERVENTIONS:  - Identify barriers to discharge w/patient and caregiver  - Arrange for needed discharge resources and transportation as appropriate  - Identify discharge learning needs (meds, wound care, etc )  - Arrange for interpretive services to assist at discharge as needed  - Refer to Case Management Department for coordinating discharge planning if the patient needs post-hospital services based on physician/advanced practitioner order or complex needs related to functional status, cognitive ability, or social support system  Outcome: Progressing     Problem: Knowledge Deficit  Goal: Patient/family/caregiver demonstrates understanding of disease process, treatment plan, medications, and discharge instructions  Description  Complete learning assessment and assess knowledge base    Interventions:  - Provide teaching at level of understanding  - Provide teaching via preferred learning methods  Outcome: Progressing

## 2019-11-19 NOTE — SOCIAL WORK
CM met w/pt and wife to discuss prescription copay  Pt eligible for first month Xarelto free with coupon  Cost is $152 99 per month ongoing  Pt stated plan to use mail order for refills to bring down copay cost  Pt's wife made aware Pharmacy she will need to  medication @ pharmacy before 7:00pm  Anticipate d/c today  SLIM updated re: same

## 2019-11-19 NOTE — ASSESSMENT & PLAN NOTE
· Blood pressures currently in the 130/70s  · Patient reports he does not take any antihypertensive medications at this time  He is controlling his blood pressure with low salt diet and lifestyle modifications  · Continue to monitor

## 2019-11-19 NOTE — ASSESSMENT & PLAN NOTE
· Blood pressures currently in the 130/70s  · Patient reports the does not take any antihypertensive medications at this time  He is controlling his blood pressure with low salt diet and lifestyle modifications  · Continue to monitor

## 2019-11-19 NOTE — SOCIAL WORK
Pt discharging with [facility]  Script sent to [facility] to check copay  Cm following

## 2019-11-19 NOTE — ASSESSMENT & PLAN NOTE
· Patient reports intermittent bilateral leg pain for over 1 year  · VAS lower limb venous duplex:  Left lower limb with an acute occlusive deep vein thrombosis in the peroneal veins  Right lower limb with no evidence of acute or chronic DVT  · Continue IV heparin  · See plan above

## 2019-11-19 NOTE — DISCHARGE SUMMARY
Discharge Summary - [facility] Internal Medicine    Patient Information: Chula Copeland 66 y o  male MRN: [id number]  Unit/Bed#: S -01 Encounter: [id number]    Discharging Physician / Practitioner: Sergio Bravo MD  PCP: Rosario Martinez MD  Admission Date: 11/18/2019  Discharge Date: 11/19/19    Reason for Admission:  Acute pulmonary embolism/DVT    Discharge Diagnoses:     Principal Problem:    Acute pulmonary embolism ([address] )  Active Problems:    Acute deep vein thrombosis (DVT) of left lower extremity ([facility] [address] )    Hypertension    Hyperlipidemia    Chest pain      Consultations During Hospital Stay:  · None    Procedures Performed:   · None    Significant findings:  · CTA chest - Nonocclusive pulmonary emboli identified within right lower lobe and lingular segmental branches  No findings of right heart strain  · Bilateral lower extremity venous duplex - LLE - There is acute occlusive deep vein thrombosis in the peroneal veins    Hospital Course:   Chula Copeland is a 66 y o  male patient who originally presented to the hospital on 11/18/2019 due to chest pain and bilateral lower extremity pain with shortness of breath  Patient has had symptoms of lower extremity pain for greater approximately 1 year  He developed chest pain and shortness of breath prompting his current presentation to the ED  Workup showed findings pulmonary embolism and acute occlusive DVT to the left lower extremity as documented above  He was started on heparin infusion and subsequently transitioned to oral Xarelto  There were no definite provoking symptoms on review of his history and no family history of clotting disorders  He is recommended to continue with oral anticoagulation for 3-6 months and follow up with his primary care physician for further management  Hypercoagulable profile was not done as the patient had already been started on heparin    He is recommended to follow up with his primary care physician for this after completion of anticoagulation course  He had no further episodes of chest pain, was hemodynamically stable through his hospital course and subsequently cleared for discharge home on 11/19/19  Condition at Discharge: stable     Discharge Day Visit / Exam:     Subjective:  Seen and evaluated  Denies chest pain, no shortness of breath    Vitals: Blood Pressure: 134/62 (11/19/19 1524)  Pulse: 72 (11/19/19 1524)  Temperature: 98 3 °F (36 8 °C) (11/19/19 1524)  Temp Source: Oral (11/19/19 1524)  Respirations: 18 (11/19/19 1524)  Height: 5' 9" (175 3 cm) (11/18/19 1900)  Weight - Scale: 83 5 kg (184 lb) (11/18/19 1900)  SpO2: 95 % (11/19/19 1524)    General Appearance:    Alert, cooperative, no distress, appropriately responsive    Head:    Normocephalic, without obvious abnormality, atraumatic, mucous membranes moist    Eyes:    Conjunctiva/corneas clear, EOM's intact   Neck:   Supple   Lungs:     Clear to auscultation bilaterally, respirations unlabored, no crackles or wheeze     Heart:    Regular rate and rhythm, S1 and S2    Abdomen:     Soft, non-tender, bowel sounds active all four quadrants,     no masses, no organomegaly   Extremities:   Extremities normal, atraumatic, no cyanosis or edema   Neurologic:  nonfocal      Discharge instructions/Information to patient and family:   See after visit summary for information provided to patient and family  Provisions for Follow-Up Care:  See after visit summary for information related to follow-up care and any pertinent home health orders  Disposition: Home    Patient and spouse updated at the bedside  All questions answered    Discharge Statement:  I spent >30 minutes discharging the patient  This time was spent on the day of discharge  I had direct contact with the patient on the day of discharge   Greater than 50% of the total time was spent examining patient, answering all patient questions, arranging and discussing plan of care with patient as well as directly providing post-discharge instructions  Additional time then spent on discharge activities  Discharge Medications:  See after visit summary for reconciled discharge medications provided to patient and family  ** Please Note: Dragon 360 Dictation voice to text software may have been used in the creation of this document   **

## 2019-11-19 NOTE — ASSESSMENT & PLAN NOTE
· Patient presented to the ER from outpatient PCP office due to complaints of bilateral leg pain, substernal chest pain, and shortness of breath  · CTA chest PE study:  "Nonocclusive pulmonary emboli identified within the right lower lobe and lingular segmental branches  No findings of right heart strain "  · Continue IV heparin  · Plan to transition from IV heparin to oral anticoagulant  · Appreciate case management consult for insurance approval of oral anticoagulant agent  · Monitor on telemetry  · Continue to monitor

## 2019-11-19 NOTE — DISCHARGE INSTRUCTIONS
Please discuss with your primary care physician regarding obtaining a hypercoagulable panel after completion of anticoagulation course in 3-6 months

## 2020-01-10 ENCOUNTER — TRANSCRIBE ORDERS (OUTPATIENT)
Dept: ADMINISTRATIVE | Facility: HOSPITAL | Age: 65
End: 2020-01-10

## 2020-01-10 DIAGNOSIS — M79.604 LEG PAIN, BILATERAL: Primary | ICD-10-CM

## 2020-01-10 DIAGNOSIS — I82.593 CHRONIC EMBOLISM AND THOMBOS OF DEEP VEIN OF LOW EXTRM, BI (HCC): ICD-10-CM

## 2020-01-10 DIAGNOSIS — M79.605 LEG PAIN, BILATERAL: Primary | ICD-10-CM

## 2020-01-16 ENCOUNTER — HOSPITAL ENCOUNTER (OUTPATIENT)
Dept: ULTRASOUND IMAGING | Facility: HOSPITAL | Age: 65
Discharge: HOME | End: 2020-01-16
Payer: COMMERCIAL

## 2020-01-16 DIAGNOSIS — I82.593 CHRONIC EMBOLISM AND THOMBOS OF DEEP VEIN OF LOW EXTRM, BI (HCC): ICD-10-CM

## 2020-01-16 DIAGNOSIS — M79.605 LEG PAIN, BILATERAL: ICD-10-CM

## 2020-01-16 DIAGNOSIS — M79.604 LEG PAIN, BILATERAL: ICD-10-CM

## 2020-01-16 PROCEDURE — 93970 EXTREMITY STUDY: CPT | Performed by: SURGERY

## 2020-01-16 PROCEDURE — 93970 EXTREMITY STUDY: CPT

## 2020-01-20 ENCOUNTER — HOSPITAL ENCOUNTER (EMERGENCY)
Facility: HOSPITAL | Age: 65
Discharge: HOME | End: 2020-01-20
Attending: EMERGENCY MEDICINE | Admitting: EMERGENCY MEDICINE
Payer: COMMERCIAL

## 2020-01-20 ENCOUNTER — APPOINTMENT (EMERGENCY)
Dept: RADIOLOGY | Facility: HOSPITAL | Age: 65
End: 2020-01-20
Payer: COMMERCIAL

## 2020-01-20 VITALS
OXYGEN SATURATION: 95 % | HEART RATE: 68 BPM | RESPIRATION RATE: 18 BRPM | DIASTOLIC BLOOD PRESSURE: 74 MMHG | BODY MASS INDEX: 27.76 KG/M2 | TEMPERATURE: 98.6 F | WEIGHT: 188 LBS | SYSTOLIC BLOOD PRESSURE: 118 MMHG

## 2020-01-20 DIAGNOSIS — R07.9 CHEST PAIN, UNSPECIFIED TYPE: Primary | ICD-10-CM

## 2020-01-20 DIAGNOSIS — R06.02 SOB (SHORTNESS OF BREATH): ICD-10-CM

## 2020-01-20 LAB
ANION GAP SERPL CALCULATED.3IONS-SCNC: 9 MMOL/L (ref 4–13)
BASOPHILS # BLD AUTO: 0.04 THOUSANDS/ΜL (ref 0–0.1)
BASOPHILS NFR BLD AUTO: 1 % (ref 0–1)
BUN SERPL-MCNC: 14 MG/DL (ref 5–25)
CALCIUM SERPL-MCNC: 9 MG/DL (ref 8.3–10.1)
CHLORIDE SERPL-SCNC: 105 MMOL/L (ref 100–108)
CO2 SERPL-SCNC: 26 MMOL/L (ref 21–32)
CREAT SERPL-MCNC: 1.19 MG/DL (ref 0.6–1.3)
D DIMER PPP FEU-MCNC: <0.27 UG/ML FEU
EOSINOPHIL # BLD AUTO: 0.08 THOUSAND/ΜL (ref 0–0.61)
EOSINOPHIL NFR BLD AUTO: 2 % (ref 0–6)
ERYTHROCYTE [DISTWIDTH] IN BLOOD BY AUTOMATED COUNT: 12.2 % (ref 11.6–15.1)
GFR SERPL CREATININE-BSD FRML MDRD: 65 ML/MIN/1.73SQ M
GLUCOSE SERPL-MCNC: 110 MG/DL (ref 65–140)
HCT VFR BLD AUTO: 45.6 % (ref 36.5–49.3)
HGB BLD-MCNC: 15.5 G/DL (ref 12–17)
IMM GRANULOCYTES # BLD AUTO: 0.02 THOUSAND/UL (ref 0–0.2)
IMM GRANULOCYTES NFR BLD AUTO: 1 % (ref 0–2)
LYMPHOCYTES # BLD AUTO: 1.51 THOUSANDS/ΜL (ref 0.6–4.47)
LYMPHOCYTES NFR BLD AUTO: 35 % (ref 14–44)
MCH RBC QN AUTO: 30.5 PG (ref 26.8–34.3)
MCHC RBC AUTO-ENTMCNC: 34 G/DL (ref 31.4–37.4)
MCV RBC AUTO: 90 FL (ref 82–98)
MONOCYTES # BLD AUTO: 0.5 THOUSAND/ΜL (ref 0.17–1.22)
MONOCYTES NFR BLD AUTO: 11 % (ref 4–12)
NEUTROPHILS # BLD AUTO: 2.22 THOUSANDS/ΜL (ref 1.85–7.62)
NEUTS SEG NFR BLD AUTO: 50 % (ref 43–75)
NRBC BLD AUTO-RTO: 0 /100 WBCS
NT-PROBNP SERPL-MCNC: 55 PG/ML
PLATELET # BLD AUTO: 179 THOUSANDS/UL (ref 149–390)
PMV BLD AUTO: 10.7 FL (ref 8.9–12.7)
POTASSIUM SERPL-SCNC: 4.3 MMOL/L (ref 3.5–5.3)
RBC # BLD AUTO: 5.09 MILLION/UL (ref 3.88–5.62)
SODIUM SERPL-SCNC: 140 MMOL/L (ref 136–145)
TROPONIN I SERPL-MCNC: <0.02 NG/ML
WBC # BLD AUTO: 4.37 THOUSAND/UL (ref 4.31–10.16)

## 2020-01-20 PROCEDURE — 85025 COMPLETE CBC W/AUTO DIFF WBC: CPT | Performed by: EMERGENCY MEDICINE

## 2020-01-20 PROCEDURE — 36415 COLL VENOUS BLD VENIPUNCTURE: CPT | Performed by: EMERGENCY MEDICINE

## 2020-01-20 PROCEDURE — 83880 ASSAY OF NATRIURETIC PEPTIDE: CPT | Performed by: EMERGENCY MEDICINE

## 2020-01-20 PROCEDURE — 84484 ASSAY OF TROPONIN QUANT: CPT | Performed by: EMERGENCY MEDICINE

## 2020-01-20 PROCEDURE — 80048 BASIC METABOLIC PNL TOTAL CA: CPT | Performed by: EMERGENCY MEDICINE

## 2020-01-20 PROCEDURE — 99285 EMERGENCY DEPT VISIT HI MDM: CPT

## 2020-01-20 PROCEDURE — 93005 ELECTROCARDIOGRAM TRACING: CPT

## 2020-01-20 PROCEDURE — 99283 EMERGENCY DEPT VISIT LOW MDM: CPT | Performed by: EMERGENCY MEDICINE

## 2020-01-20 PROCEDURE — 85379 FIBRIN DEGRADATION QUANT: CPT | Performed by: EMERGENCY MEDICINE

## 2020-01-20 PROCEDURE — 71046 X-RAY EXAM CHEST 2 VIEWS: CPT

## 2020-01-20 NOTE — ED PROCEDURE NOTE
PROCEDURE  ECG 12 Lead Documentation Only  Date/Time: 1/20/2020 5:24 PM  Performed by: Karma Oquendo MD  Authorized by: Karma Oquendo MD     Indications / Diagnosis:  Er ecg # 2   ECG reviewed by me, the ED Provider: yes    Patient location:  ED and bedside  Previous ECG:     Previous ECG:  Compared to current    Comparison ECG info:  Compared to  iitial er ecg- no sign changes    Similarity:  No change    Comparison to cardiac monitor: Yes    Interpretation:     Interpretation: non-specific    Rate:     ECG rate:  70    ECG rate assessment: normal    Rhythm:     Rhythm: sinus rhythm    Ectopy:     Ectopy: none    QRS:     QRS axis:  Normal    QRS intervals:  Normal  Conduction:     Conduction: abnormal      Abnormal conduction: 1st degree    ST segments:     ST segments:  Normal  T waves:     T waves: flattening      Flattening:  III and V2  Q waves:     Q waves:  III  Other findings:     Other findings: U wave    Comments:      No ecg signs of ischemia/ injury / r heart strain / aamir/pericarditis         Karma Oquendo MD  01/20/20 8875

## 2020-01-20 NOTE — ED NOTES
Patient transported to [address] [place] [address], [address] [place] [address]  01/20/20 4565

## 2020-01-20 NOTE — ED PROCEDURE NOTE
PROCEDURE  CriticalCare Time  Performed by: Kandice Rivas MD  Authorized by: Kandice Rivas MD     Critical care provider statement:     Critical care time (minutes):  35    Critical care start time:  1/20/2020 5:00 PM    Critical care end time:  1/20/2020 5:35 PM    Critical care time was exclusive of:  Separately billable procedures and treating other patients and teaching time    Critical care was necessary to treat or prevent imminent or life-threatening deterioration of the following conditions: chest pain adp      Critical care was time spent personally by me on the following activities:  Examination of patient, development of treatment plan with patient or surrogate, ordering and review of laboratory studies, ordering and review of radiographic studies, re-evaluation of patient's condition and review of old charts    I assumed direction of critical care for this patient from another provider in my specialty: elizabeth Rivas MD  01/20/20 7950

## 2020-01-20 NOTE — DISCHARGE INSTRUCTIONS
DiAgnosis; chest pain/ shortness of breath     - activity as tolerated     - please call your primary doctor tomorrow - to schedule an appointment for a recheck this week     - based on our negative er chest pain workup- you are a low risck chest pain patient- there is no such thing as a no risk chest pain patient- please return to  the er for any new/ worsening/concerning symptoms to you

## 2020-01-20 NOTE — ED PROVIDER NOTES
History  Chief Complaint   Patient presents with    Chest Pain     c/o substernal chest pain that radiates into back, increased SOB x 3 days  Pt was recently hospitalized with LLE DVT and PE        60 yr  Male- with unprovoked  First vte at 11/18/19-- left calf vein dvt with segmental pe- currently on doac- compliant-- states returned from trip to [facility] at beginning  on 1/20-- was doing fine until 2 days ago- when at rest- devloped multiple episodes of seconds of ant sharp - to dull cp- with sob-- with throbbing neck and upper back pain --  This happened yesterday as well but less often- also with mild nasal congestion / dry cough - but no fever/ ill contacts/ flu symptoms-- today with sob- no real cp -- no ble edema- pt wears non medical compression soc pt states this presentation is different than pe- when had burning cp/ and mild sob-- no abrupt onset of ripping/tearing pain - no pleuritic type pain       History provided by:  Patient   used: No        Prior to Admission Medications   Prescriptions Last Dose Informant Patient Reported? Taking? Omega-3 Fatty Acids (FISH OIL) 1,000 mg   Yes No   Sig: Take 1,000 mg by mouth daily   rivaroxaban (XARELTO STARTER PACK) 15 & 20 MG starter pack   No No   Sig: Take 1 tablet by mouth see administration instructions      Facility-Administered Medications: None       Past Medical History:   Diagnosis Date    DVT (deep venous thrombosis) (HCC)     LLE    Pulmonary emboli (HCC)        Past Surgical History:   Procedure Laterality Date    ROTATOR CUFF REPAIR      SINUS SURGERY         History reviewed  No pertinent family history  I have reviewed and agree with the history as documented  Social History     Tobacco Use    Smoking status: Former Smoker    Smokeless tobacco: Never Used   Substance Use Topics    Alcohol use: Yes     Comment: rare    Drug use: Never        Review of Systems   Constitutional: Negative      HENT: Positive for congestion  Negative for dental problem, drooling, ear discharge, ear pain, facial swelling, hearing loss, mouth sores, nosebleeds, postnasal drip, rhinorrhea, sinus pressure, sinus pain, sneezing, sore throat, tinnitus, trouble swallowing and voice change  Eyes: Negative  Respiratory: Positive for cough and shortness of breath  Negative for apnea, choking, chest tightness, wheezing and stridor  Cardiovascular: Positive for chest pain  Negative for palpitations and leg swelling  Gastrointestinal: Negative  Endocrine: Negative  Genitourinary: Negative  Musculoskeletal: Positive for back pain and neck pain  Negative for arthralgias, gait problem, joint swelling, myalgias and neck stiffness  Skin: Negative  Allergic/Immunologic: Negative  Neurological: Negative  Hematological: Negative  Psychiatric/Behavioral: Negative  Physical Exam  Physical Exam   Constitutional: He is oriented to person, place, and time  He appears well-developed and well-nourished  Non-toxic appearance  He does not appear ill  No distress  avss- pulse ox  93-95 % on ra- interpretation is low- normal- no intervention- in nad   HENT:   Head: Normocephalic and atraumatic  No bilateral max/frontal sinus tenderness/edema/ erythema   Eyes: Pupils are equal, round, and reactive to light  EOM are normal    Mm pink   Neck: Normal range of motion  Neck supple  No hepatojugular reflux and no JVD present  No tracheal deviation present  No thyromegaly present  No meningeal signs- no carotid bruits bilaterally    Cardiovascular: Normal rate, regular rhythm and intact distal pulses  No extrasystoles are present  Exam reveals no gallop, no S3, no S4, no distant heart sounds and no friction rub  No murmur heard  No systolic murmur is present  No diastolic murmur is present  Pulses:       Carotid pulses are 3+ on the right side, and 3+ on the left side         Radial pulses are 3+ on the right side, and 3+ on the left side  Dorsalis pedis pulses are 3+ on the right side, and 3+ on the left side  Pulmonary/Chest: Effort normal  No accessory muscle usage or stridor  No tachypnea  No respiratory distress  He has no decreased breath sounds  He has no wheezes  He has rhonchi in the right lower field  He has no rales  Abdominal: Soft  Bowel sounds are normal  He exhibits no distension, no ascites and no mass  There is no splenomegaly or hepatomegaly  There is no tenderness  There is no rebound and no guarding  Soft nt/nd- no cva tenderness- no peritoneal signs- no pulsatile abd mass/bruit   Musculoskeletal: Normal range of motion  Right lower leg: Normal  He exhibits no tenderness and no edema  Left lower leg: Normal  He exhibits no tenderness and no edema  Equal bilateral radial/dp pulses- no ble edema/calf tenderness/asym/ erythema   Lymphadenopathy:     He has no cervical adenopathy  Neurological: He is alert and oriented to person, place, and time  He is not disoriented  No cranial nerve deficit  Skin: Skin is warm  Capillary refill takes less than 2 seconds  No abrasion, no ecchymosis and no rash noted  He is not diaphoretic  No cyanosis or erythema  No pallor  Nails show no clubbing  Psychiatric: He has a normal mood and affect  His behavior is normal  His mood appears not anxious  He is not agitated  Nursing note and vitals reviewed        Vital Signs  ED Triage Vitals [01/20/20 1340]   Temperature Pulse Respirations Blood Pressure SpO2   98 6 °F (37 °C) 74 18 148/72 95 %      Temp Source Heart Rate Source Patient Position - Orthostatic VS BP Location FiO2 (%)   Oral Monitor Sitting Left arm --      Pain Score       5           Vitals:    01/20/20 1340   BP: 148/72   Pulse: 74   Patient Position - Orthostatic VS: Sitting         Visual Acuity      ED Medications  Medications - No data to display    Diagnostic Studies  Results Reviewed     Procedure Component Value Units Date/Time CBC and differential [[id number]]     Lab Status:  No result Specimen:  Blood     Basic metabolic panel [[id number]]     Lab Status:  No result Specimen:  Blood     B-Type Natriuretic Peptide ([facility]) [[id number]]     Lab Status:  No result Specimen:  Blood     Troponin I [[id number]]     Lab Status:  No result Specimen:  Blood     D-Dimer [[id number]]     Lab Status:  No result Specimen:  Blood                  XR chest 2 views    (Results Pending)              Procedures  Procedures         ED Course  ED Course as of Jan 20 2020   Mon Jan 20, 2020   1405 Er md note- 11/18/19 d/c summary reviewed by er md/ labs/ imaging results      1406 Er md note-  bue pulse ox pleth 93-94 % on ra-  equal      1407 Er md medical decision making note- pt is low risk  for pe by well's score- score of 1 5- fails perc by age-  and previous event- will check d dimer and use 10 x age as cutoff       1429 CXR PA/LAT- COMPARED TO PREVIOUS FROM 11/18/19- NO SIGN CHANGES- NO CHANGE IN MEDIASTINUM/ CARDIAC SILHOUETTE- NO FREE/SQ AIR- NO INFILTRATE/ PTX/ PULM EDEMA/ PLEURAL EFFUSIONS      1547 Er md note-  disposition fd/w pt and wife- pt offered  obs type cp ruleout-- -- also d/ repeat  3 hrs ecg/ trop --  which d/w pt  can get pt down to a low risk -approx 1 out of 100 will hafve a mi within 1 month -- pt wants to repeat blood work in 3 hrs- and does not want to be admitted -- aware and accepts risk   by leaving with  repeat testing only                                   MDM      Disposition  Final diagnoses:   None     ED Disposition     None      Follow-up Information    None         Patient's Medications   Discharge Prescriptions    No medications on file     No discharge procedures on file      ED Provider  Electronically Signed by           Herbert Hardin MD  01/20/20 2020

## 2020-01-21 LAB
ATRIAL RATE: 61 BPM
ATRIAL RATE: 73 BPM
ATRIAL RATE: 76 BPM
P AXIS: 26 DEGREES
P AXIS: 34 DEGREES
P AXIS: 39 DEGREES
PR INTERVAL: 208 MS
PR INTERVAL: 212 MS
PR INTERVAL: 226 MS
QRS AXIS: 25 DEGREES
QRS AXIS: 26 DEGREES
QRS AXIS: 27 DEGREES
QRSD INTERVAL: 92 MS
QRSD INTERVAL: 96 MS
QRSD INTERVAL: 98 MS
QT INTERVAL: 366 MS
QT INTERVAL: 374 MS
QT INTERVAL: 398 MS
QTC INTERVAL: 395 MS
QTC INTERVAL: 404 MS
QTC INTERVAL: 404 MS
T WAVE AXIS: 20 DEGREES
T WAVE AXIS: 23 DEGREES
T WAVE AXIS: 30 DEGREES
VENTRICULAR RATE: 59 BPM
VENTRICULAR RATE: 70 BPM
VENTRICULAR RATE: 73 BPM

## 2020-01-21 PROCEDURE — 93010 ELECTROCARDIOGRAM REPORT: CPT | Performed by: INTERNAL MEDICINE

## 2020-01-28 ENCOUNTER — TELEPHONE (OUTPATIENT)
Dept: HEMATOLOGY ONCOLOGY | Facility: CLINIC | Age: 65
End: 2020-01-28

## 2020-01-28 PROBLEM — I27.82 CHRONIC PULMONARY EMBOLISM WITHOUT ACUTE COR PULMONALE (HCC): Status: ACTIVE | Noted: 2019-11-18

## 2020-01-28 RX ORDER — CYCLOSPORINE 0.5 MG/ML
EMULSION OPHTHALMIC
COMMUNITY

## 2020-01-28 RX ORDER — PENICILLIN V POTASSIUM 500 MG/1
TABLET ORAL
COMMUNITY
End: 2020-08-24 | Stop reason: ALTCHOICE

## 2020-01-28 RX ORDER — GABAPENTIN 300 MG/1
CAPSULE ORAL
COMMUNITY
End: 2020-01-29

## 2020-01-28 RX ORDER — PREDNISONE 10 MG/1
TABLET ORAL
COMMUNITY
End: 2020-01-29

## 2020-01-28 NOTE — TELEPHONE ENCOUNTER
New Patient Encounter    New Patient Intake Form   Patient Details:  Angelika Caballero  1955  [id number]    Background Information:  [address] starts by opening a telephone encounter and gathering the following information   Who is calling to schedule? If not self, relationship to patient? self   Referring Provider LORNA Callejas   What is the diagnosis? DVT PE 1/2020   Is this diagnosis confirmed Yes   Is there a confirmed diagnosis from a biopsy/tissue reviewed by pathology? Is there any prior history of Cancer? No   If yes, please explain    When was the diagnosis? 1/2020   Is patient aware of diagnosis? Yes   Reason for visit? NP DX   Have you had any testing done? If so: when, where? Yes   Are records in [employer]? yes   Was the patient told to bring a disk? no   Scheduling Information:   Preferred [place]: [facility]     Requesting Specific Provider? no   Are there any dates/time the patient cannot be seen? no      Miscellaneous:    After completing the above information, please route to Financial Counselor and the appropriate Nurse Navigator for review

## 2020-01-29 ENCOUNTER — OFFICE VISIT (OUTPATIENT)
Dept: PULMONOLOGY | Facility: CLINIC | Age: 65
End: 2020-01-29
Payer: COMMERCIAL

## 2020-01-29 VITALS
RESPIRATION RATE: 18 BRPM | OXYGEN SATURATION: 98 % | TEMPERATURE: 98.4 F | HEART RATE: 88 BPM | WEIGHT: 188 LBS | HEIGHT: 69 IN | DIASTOLIC BLOOD PRESSURE: 80 MMHG | SYSTOLIC BLOOD PRESSURE: 130 MMHG | BODY MASS INDEX: 27.85 KG/M2

## 2020-01-29 DIAGNOSIS — G47.33 OBSTRUCTIVE SLEEP APNEA: ICD-10-CM

## 2020-01-29 DIAGNOSIS — M79.89 SWELLING OF LOWER EXTREMITY: ICD-10-CM

## 2020-01-29 DIAGNOSIS — I82.452 ACUTE DEEP VEIN THROMBOSIS (DVT) OF LEFT PERONEAL VEIN (HCC): Primary | ICD-10-CM

## 2020-01-29 DIAGNOSIS — R07.89 OTHER CHEST PAIN: ICD-10-CM

## 2020-01-29 DIAGNOSIS — I27.82 OTHER CHRONIC PULMONARY EMBOLISM WITHOUT ACUTE COR PULMONALE (HCC): ICD-10-CM

## 2020-01-29 DIAGNOSIS — R06.00 DYSPNEA ON EXERTION: ICD-10-CM

## 2020-01-29 PROBLEM — R06.09 DYSPNEA ON EXERTION: Status: ACTIVE | Noted: 2020-01-29

## 2020-01-29 PROCEDURE — 99244 OFF/OP CNSLTJ NEW/EST MOD 40: CPT | Performed by: INTERNAL MEDICINE

## 2020-01-29 RX ORDER — ALBUTEROL SULFATE 90 UG/1
2 AEROSOL, METERED RESPIRATORY (INHALATION) EVERY 6 HOURS PRN
Qty: 1 INHALER | Refills: 11 | Status: SHIPPED | OUTPATIENT
Start: 2020-01-29

## 2020-01-29 NOTE — ASSESSMENT & PLAN NOTE
Diagnosed 11/19; DVT still present on duplex from 1/20  On Xarelto  Plan was for 3 months' anticoagulation but this does not appear to have been a provoked DVT  He requires age-appropriate cancer screening with consideration of prostate exam, PSA, colonoscopy  It is probably too early to screen him for CTEPH with V/Q, which I would consider as he is not back to his baseline with respect to breathing  However, we will update an ECHO to get a sense of his pulmonary pressure  Check PFTs to assess for concomitant conditions like asthma given his significant allergies

## 2020-01-29 NOTE — ASSESSMENT & PLAN NOTE
He appears to have some degree of venous insufficiency, but we will check an ECHO to assess pump function

## 2020-01-29 NOTE — ASSESSMENT & PLAN NOTE
Asthma and/or COPD will remain possibilities as contributors to this symptom  We'll check some PFTs

## 2020-01-29 NOTE — ASSESSMENT & PLAN NOTE
He has persistence of this finding at 2 months  This further tips the risk/benefit balance toward ongoing anticoagulation  This also may account for the ongoing issues with neck/chest pain he is experiencing given that these were present back when he was initially diagnosed; however, the pain may also more be related to DJD in the neck and unrelated  Regardless, he stays on [facility] for now

## 2020-01-29 NOTE — ASSESSMENT & PLAN NOTE
He reports poor sleep, is due for a pressure titration in about a month  I encouraged him to keep this appointment to improve his sleep

## 2020-01-29 NOTE — ASSESSMENT & PLAN NOTE
I referred him urgently to cardiology for evaluation  He reports a stress test 6-7 years ago and was seen recently in the ED for anginal symptoms    His neck/chest pain are probably not anginal in origin but I can't clearly rule them out, and he also has some subjective tachyarrhythmias for which I think he should be evaluated by a Cardiologist

## 2020-01-29 NOTE — PROGRESS NOTES
Pulmonary Consultation   Larissa Mejia 60 y o  male MRN: [id number]  1/29/2020      Assessment:    Chronic pulmonary embolism without acute cor pulmonale ([address] )  Diagnosed 11/19; DVT still present on duplex from 1/20  On Xarelto  Plan was for 3 months' anticoagulation but this does not appear to have been a provoked DVT  He requires age-appropriate cancer screening with consideration of prostate exam, PSA, colonoscopy  It is probably too early to screen him for CTEPH with V/Q, which I would consider as he is not back to his baseline with respect to breathing  However, we will update an ECHO to get a sense of his pulmonary pressure  Check PFTs to assess for concomitant conditions like asthma given his significant allergies  Acute deep vein thrombosis (DVT) of left lower extremity (HCC)  He has persistence of this finding at 2 months  This further tips the risk/benefit balance toward ongoing anticoagulation  This also may account for the ongoing issues with neck/chest pain he is experiencing given that these were present back when he was initially diagnosed; however, the pain may also more be related to DJD in the neck and unrelated  Regardless, he stays on [facility] for now  Obstructive sleep apnea  He reports poor sleep, is due for a pressure titration in about a month  I encouraged him to keep this appointment to improve his sleep  Other chest pain  I referred him urgently to cardiology for evaluation  He reports a stress test 6-7 years ago and was seen recently in the ED for anginal symptoms  His neck/chest pain are probably not anginal in origin but I can't clearly rule them out, and he also has some subjective tachyarrhythmias for which I think he should be evaluated by a Cardiologist     Dyspnea on exertion  Asthma and/or COPD will remain possibilities as contributors to this symptom  We'll check some PFTs        Swelling of lower extremity  He appears to have some degree of venous insufficiency, but we will check an ECHO to assess pump function  Plan:    Diagnoses and all orders for this visit:    Acute deep vein thrombosis (DVT) of left peroneal vein    Other chronic pulmonary embolism without acute cor pulmonale (HCC)  -     Echo complete with contrast if indicated; Future    Obstructive sleep apnea    Dyspnea on exertion  -     albuterol (VENTOLIN HFA) 90 mcg/act inhaler; Inhale 2 puffs every 6 (six) hours as needed for wheezing  -     Complete PFT with post bronchodilator; Future  -     Ambulatory referral to Cardiology; Future    Other chest pain  -     Ambulatory referral to Cardiology; Future    Swelling of lower extremity    Return in about 2 months (around 3/29/2020)  History of Present Illness   HPI:  Chula Copeland is a 60 y o  male who presents for evaluation of a pulmonary embolism  He was hospitalized in November with an acute DVT of the peroneal veins of the left leg with segmental bilateral PEs without evidence of right heart strain, was started on a heparin drip and transitioned to Xarelto  He has not returned to his baseline since that time  His PCP ordered a LE duplex and found persistence of clot, although a lower clot burden, as of a few weeks ago  He was under the impression he would be discontinuing anticoagulation at 3 months, but it seems his clot was unprovoked and his duration of treatment is not yet clear  At present, he feels more short of breath than usual, but also he has not yet returned to work  He ends up walking up to 9 miles per day at his job as a  at a local high school  His PCP has not yet cleared him to return to work  He has ongoing issues with lower extremity swelling which is bilateral despite the presence of only unilateral clot  He thinks he has poor circulation as he gets bluish discoloration to his distal lower extremities when they are significantly swollen    He also notices ongoing issues with a burning sensation in his chest and right-sided neck pain which may be more attributable to known spine disease than recurrent clots; however, he does note that the symptoms that brought him to the ED when he was diagnosed with a pulmonary embolism was the same pain  He has previously been screened with a stress test 6-7 years ago, and notes he was having the same neck and chest pain at that time  The stress test was negative  He was also seen in the emergency room about 2 weeks ago with chest pain, and acutely ruled out for acute coronary syndrome  Finally, he additionally notes subjective palpitations which come in a paroxysmal fashion, which have never been evaluated with a Holter monitor  He additionally has a history of obstructive sleep apnea, and is overdue for a CPAP titration study, but this is scheduled in 1 months time  He notes a history of seasonal allergies and previous use of a rescue inhaler which he felt was effective for him, but denies ever having been told he has asthma  He has a 15 pack year smoking history and denies being told he has had COPD  Review of Systems   Constitutional: Positive for unexpected weight change  Negative for fever  HENT: Positive for voice change  Respiratory: Positive for apnea, cough, chest tightness, shortness of breath and wheezing  Cardiovascular: Positive for chest pain, palpitations and leg swelling  Hematological: Bruises/bleeds easily  All other systems reviewed and are negative  Historical Information   Past Medical History:   Diagnosis Date    DVT (deep venous thrombosis) (HCC)     LLE    Pulmonary emboli (HCC)      Past Surgical History:   Procedure Laterality Date    ROTATOR CUFF REPAIR      SINUS SURGERY       History reviewed  No pertinent family history        Meds/Allergies     Current Outpatient Medications:     cycloSPORINE (RESTASIS) 0 05 % ophthalmic emulsion, Restasis 0 05 % eye drops in a dropperette, Disp: , Rfl:     Omega-3 Fatty Acids (FISH OIL) 1,000 mg, Take 1,000 mg by mouth daily, Disp: , Rfl:     penicillin V potassium (VEETID) 500 mg tablet, penicillin V potassium 500 mg tablet, Disp: , Rfl:     rivaroxaban (XARELTO STARTER PACK) 15 & 20 MG starter pack, Take 1 tablet by mouth see administration instructions, Disp: 1 Package, Rfl: 0    albuterol (VENTOLIN HFA) 90 mcg/act inhaler, Inhale 2 puffs every 6 (six) hours as needed for wheezing, Disp: 1 Inhaler, Rfl: 11  No Known Allergies    Vitals: Blood pressure 130/80, pulse 88, temperature 98 4 °F (36 9 °C), temperature source Tympanic, resp  rate 18, height 5' 9" (1 753 m), weight 85 3 kg (188 lb), SpO2 98 %  Body mass index is 27 76 kg/m²  Oxygen Therapy  SpO2: 98 %      Physical Exam  Physical Exam   Constitutional: He is oriented to person, place, and time  He appears well-developed and well-nourished  No distress  HENT:   Head: Normocephalic and atraumatic  Mouth/Throat: No oropharyngeal exudate  Eyes: Pupils are equal, round, and reactive to light  Conjunctivae and EOM are normal    Neck: Neck supple  No JVD present  No thyromegaly present  Cardiovascular: Normal rate, regular rhythm and normal heart sounds  Exam reveals no gallop and no friction rub  No murmur heard  Pulmonary/Chest: Effort normal and breath sounds normal  No respiratory distress  He has no wheezes  He has no rales  Musculoskeletal: He exhibits no edema or tenderness  Lymphadenopathy:     He has no cervical adenopathy  Neurological: He is alert and oriented to person, place, and time  Skin: Skin is warm and dry  No rash noted  Lower extremity venous varicosities noted in both legs  Vitals reviewed  Labs: I have personally reviewed pertinent lab results    Lab Results   Component Value Date    WBC 4 37 01/20/2020    HGB 15 5 01/20/2020    HCT 45 6 01/20/2020    MCV 90 01/20/2020     01/20/2020     Lab Results   Component Value Date    CALCIUM 9 0 01/20/2020    K 4 3 01/20/2020 CO2 26 01/20/2020     01/20/2020    BUN 14 01/20/2020    CREATININE 1 19 01/20/2020     No results found for: IGE  Lab Results   Component Value Date    ALT 29 11/18/2019    AST 18 11/18/2019    ALKPHOS 85 11/18/2019     Imaging and other studies: I have personally reviewed pertinent films in PACS    Valentine Levy, M D [facility]

## 2020-02-05 ENCOUNTER — OFFICE VISIT (OUTPATIENT)
Dept: CARDIOLOGY CLINIC | Facility: CLINIC | Age: 65
End: 2020-02-05
Payer: COMMERCIAL

## 2020-02-05 VITALS
HEART RATE: 58 BPM | HEIGHT: 69 IN | SYSTOLIC BLOOD PRESSURE: 116 MMHG | WEIGHT: 186.6 LBS | BODY MASS INDEX: 27.64 KG/M2 | DIASTOLIC BLOOD PRESSURE: 78 MMHG

## 2020-02-05 DIAGNOSIS — R07.89 OTHER CHEST PAIN: ICD-10-CM

## 2020-02-05 DIAGNOSIS — I26.99 PULMONARY EMBOLI (HCC): Primary | ICD-10-CM

## 2020-02-05 DIAGNOSIS — R06.00 DYSPNEA ON EXERTION: ICD-10-CM

## 2020-02-05 PROCEDURE — 99243 OFF/OP CNSLTJ NEW/EST LOW 30: CPT | Performed by: INTERNAL MEDICINE

## 2020-02-05 PROCEDURE — 93000 ELECTROCARDIOGRAM COMPLETE: CPT | Performed by: INTERNAL MEDICINE

## 2020-02-05 RX ORDER — DIPHENOXYLATE HYDROCHLORIDE AND ATROPINE SULFATE 2.5; .025 MG/1; MG/1
1 TABLET ORAL DAILY
COMMUNITY

## 2020-02-05 NOTE — PROGRESS NOTES
Cardiology Consultation     Chula Copeland  [id number]  1955  HEART & VASCULAR [facility] [place]  [address] [facility] [address]      1  Other chest pain  POCT ECG   2  Dyspnea on exertion  POCT ECG       Discussion/Summary: 1  Pulmonary embolism  2  Dyspnea on exertion  3  Atypical chest pain- now resolved    - CTA 11/18/2019 showed nonocclusive pulmonary embolism identified within the right lower lobe and lingular segmental branches with no significant right heart strain  - repeat vascular study of lower extremities on 01/16/2020 showed subacute versus chronic deep vein thrombosis in a short segment of 1 of the peroneal veins of the left lower extremity with no acute or chronic DVT in the right lower extremity  - troponins from both hospitalizations were negative x3  - EKG performed in the office today showed sinus bradycardia heart rate 58 beats per minute with first-degree AV block relatively unchanged from previous EKGs in November of 2019  - will follow-up on transthoracic echocardiogram  - stressed importance of compliance with anticoagulation  - patient will follow-up with his primary care physician as well as Hematology with the 1st appointment with hematology schedule for tomorrow  -  Counseled patient that if he were to have any warning or alarm type symptoms he should seek emergency medical care immediately  - will see patient in follow-up 1 week after echocardiogram is performed to follow-up results as well as determine if any further cardiac testing will be needed at that time  - will also check fasting lipid panel to help assess ASCVD risk        History of Present Illness:  Lower extremity DVT and pulmonary embolism:  - patient is a 22-year-old male past medical history significant for hospitalization in November of 2019 for acute pulmonary embolism and occlusive lower extremity DVT of the left leg who was placed on oral anticoagulation after being initiated on heparin infusion during that hospitalization and discharged to home in stable condition with plan to follow-up with his primary care physician at that time  -  During that initial hospitalization patient did note an acute onset the day prior of chest pain that was sharp in nature substernal and radiating to patient's back  The patient did have associated shortness of breath and pain and swelling in his lower extremities during that initial episode  The patient notes that he has had pain and swelling in his lower extremities for the past several years  He had been seen by a chiropractor for back problems which is what he assumed the issue was from but on the night question in November with substernal chest pain the patient became appropriately concerned and presented to the hospital for further evaluation where they diagnosed lower extremity DVT and pulmonary embolism  -  The patient denies any primary family history in regards to his mother or father of heart disease but does note a cousin who had a congenital abnormality that required 3 heart transplants over her time before she unfortunately passed at the age of around 28  There is no other family history of significant heart disease or sudden cardiac death  -  Prior to this episode patient had been diagnosed with hyperlipidemia however had, off of his medication in lieu of decision for better lifestyle modification to assist with that  -  He also recently went to the emergency department on 01/20/2020 for sharp chest pain lasting a few seconds that again radiated to the back and right side of his neck  Troponins trended at that time were negative and EKG was performed which showed sinus rhythm with first-degree AV block but was otherwise unchanged from previous EKGs  -  The patient notes that currently he still has symptoms of shortness of breath on exertion as well as residual lower extremity pain and swelling particularly in the feet and ankles  He notes that this pain improves with walking and the swelling in his calves and pain in his calves has significantly improved since being on the blood thinner  He notes 100% compliance with his Xarelto but has not noticed any improvement in the shortness of breath since initiating the medication over 3 months ago      Patient Active Problem List   Diagnosis    Chronic pulmonary embolism without acute cor pulmonale (HCC)    Acute deep vein thrombosis (DVT) of left lower extremity (HCC)    Hypertension    Hyperlipidemia    Other chest pain    Obstructive sleep apnea    Dyspnea on exertion    Swelling of lower extremity     Past Medical History:   Diagnosis Date    DVT (deep venous thrombosis) (HCC)     LLE    Pulmonary emboli (HCC)      Social History     Socioeconomic History    Marital status: /Civil Union     Spouse name: Not on file    Number of children: Not on file    Years of education: Not on file    Highest education level: Not on file   Occupational History    Not on file   Social Needs    Financial resource strain: Not on file    Food insecurity:     Worry: Not on file     Inability: Not on file    Transportation needs:     Medical: Not on file     Non-medical: Not on file   Tobacco Use    Smoking status: Former Smoker     Packs/day: 1 00     Years: 15 00     Pack years: 15 00     Types: Cigarettes, Cigars     Last attempt to quit: 2000     Years since quittin 1    Smokeless tobacco: Never Used   Substance and Sexual Activity    Alcohol use: Yes     Comment: rare    Drug use: Never    Sexual activity: Not on file   Lifestyle    Physical activity:     Days per week: Not on file     Minutes per session: Not on file    Stress: Not on file   Relationships    Social connections:     Talks on phone: Not on file     Gets together: Not on file     Attends [employer] service: Not on file     Active member of club or organization: Not on file     Attends meetings of clubs or organizations: Not on file     Relationship status: Not on file    Intimate partner violence:     Fear of current or ex partner: Not on file     Emotionally abused: Not on file     Physically abused: Not on file     Forced sexual activity: Not on file   Other Topics Concern    Not on file   Social History Narrative    Not on file      No family history on file    Past Surgical History:   Procedure Laterality Date    ROTATOR CUFF REPAIR      SINUS SURGERY         Current Outpatient Medications:     cycloSPORINE (RESTASIS) 0 05 % ophthalmic emulsion, Restasis 0 05 % eye drops in a dropperette, Disp: , Rfl:     multivitamin (THERAGRAN) TABS, Take 1 tablet by mouth daily, Disp: , Rfl:     Omega-3 Fatty Acids (FISH OIL) 1,000 mg, Take 1,000 mg by mouth daily, Disp: , Rfl:     rivaroxaban (XARELTO) 20 mg tablet, Take 20 mg by mouth, Disp: , Rfl:     albuterol (VENTOLIN HFA) 90 mcg/act inhaler, Inhale 2 puffs every 6 (six) hours as needed for wheezing (Patient not taking: Reported on 2/5/2020), Disp: 1 Inhaler, Rfl: 11    penicillin V potassium (VEETID) 500 mg tablet, penicillin V potassium 500 mg tablet, Disp: , Rfl:   No Known Allergies      Labs:  Admission on 01/20/2020, Discharged on 01/20/2020   Component Date Value    WBC 01/20/2020 4 37     RBC 01/20/2020 5 09     Hemoglobin 01/20/2020 15 5     Hematocrit 01/20/2020 45 6     MCV 01/20/2020 90     MCH 01/20/2020 30 5     MCHC 01/20/2020 34 0     RDW 01/20/2020 12 2     MPV 01/20/2020 10 7     Platelets 63/25/8947 179     nRBC 01/20/2020 0     Neutrophils Relative 01/20/2020 50     Immat GRANS % 01/20/2020 1     Lymphocytes Relative 01/20/2020 35     Monocytes Relative 01/20/2020 11     Eosinophils Relative 01/20/2020 2     Basophils Relative 01/20/2020 1     Neutrophils Absolute 01/20/2020 2 22     Immature Grans Absolute 01/20/2020 0 02     Lymphocytes Absolute 01/20/2020 1 51     Monocytes Absolute 01/20/2020 0 50     Eosinophils Absolute 01/20/2020 0 08     Basophils Absolute 01/20/2020 0 04     Sodium 01/20/2020 140     Potassium 01/20/2020 4 3     Chloride 01/20/2020 105     CO2 01/20/2020 26     ANION GAP 01/20/2020 9     BUN 01/20/2020 14     Creatinine 01/20/2020 1 19     Glucose 01/20/2020 110     Calcium 01/20/2020 9 0     eGFR 01/20/2020 65     Troponin I 01/20/2020 <0 02     D-Dimer, Quant 01/20/2020 <0 27     NT-proBNP 01/20/2020 55     Troponin I 01/20/2020 <0 02     Troponin I 01/20/2020 <0 02     Ventricular Rate 01/20/2020 73     Atrial Rate 01/20/2020 76     IN Interval 01/20/2020 212     QRSD Interval 01/20/2020 92     QT Interval 01/20/2020 366     QTC Interval 01/20/2020 404     P Axis 01/20/2020 39     QRS Axis 01/20/2020 26     T Wave Axis 01/20/2020 23     Ventricular Rate 01/20/2020 59     Atrial Rate 01/20/2020 61     IN Interval 01/20/2020 208     QRSD Interval 01/20/2020 98     QT Interval 01/20/2020 398     QTC Interval 01/20/2020 395     P Axis 01/20/2020 26     QRS Axis 01/20/2020 25     T Wave Axis 01/20/2020 30     Ventricular Rate 01/20/2020 70     Atrial Rate 01/20/2020 73     IN Interval 01/20/2020 226     QRSD Interval 01/20/2020 96     QT Interval 01/20/2020 374     QTC Interval 01/20/2020 404     P Axis 01/20/2020 34     QRS Axis 01/20/2020 27     T Wave Axis 01/20/2020 20         Imaging: Xr Chest 2 Views    Result Date: 1/20/2020  Narrative: CHEST INDICATION:   cough  Former smoker  COMPARISON:  CT chest 11/18/2019  EXAM PERFORMED/VIEWS:  XR CHEST PA & LATERAL  The frontal view was performed utilizing dual energy radiographic technique  FINDINGS: Cardiomediastinal silhouette appears unremarkable  The lungs are clear  No pneumothorax or pleural effusion  Osseous structures appear within normal limits for patient age  Impression: No acute cardiopulmonary disease   Workstation performed: [id number]     Vas Lower Limb Venous Duplex Study, Complete Bilateral    Result Date: 1/16/2020  Narrative:  [facility] REPORT CLINICAL: Indications: Patient presents to determine propagation vs resolution of previously noted DVT in the left peroneal veins discovered on 11/18/2019  Patient has been taking xarelto, complains of bilateral numbness and tingling in his legs  Operative History: denies cardio-vascular surgeries Risk Factors The patient has history of HTN, HLD and DVT  He has no history of Diabetes  FINDINGS:  Left      Impression                        Peroneal  E3  Occlusive Segmental (Chronic)     CONCLUSION: Impression: RIGHT LOWER LIMB: No evidence of acute or chronic deep vein thrombosis  No evidence of superficial thrombophlebitis noted  Doppler evaluation shows a normal response to augmentation maneuvers  Popliteal, posterior tibial and anterior tibial arterial Doppler waveforms are triphasic  LEFT LOWER LIMB: There is subacute versus chronic deep vein thrombosis noted in a short segment of one of the peroneal veins  No evidence of superficial thrombophlebitis noted  Doppler evaluation shows a normal response to augmentation maneuvers  Popliteal, posterior tibial and anterior tibial arterial Doppler waveforms are triphasic  In comparison to the study of 11/18/2019, there is slight improvement of the previously noted DVT  SIGNATURE: Electronically Signed by: Margoth Hernandez MD, [address] on 2020-01-16 08:55:35 PM        Review of Systems:  Review of Systems   Constitutional: Negative for chills, fever and unexpected weight change  HENT: Negative for trouble swallowing and voice change  Respiratory: Positive for shortness of breath (  Dyspnea on exertion)  Negative for wheezing  Cardiovascular: Positive for leg swelling  Negative for chest pain and palpitations  Gastrointestinal: Negative for abdominal pain, blood in stool, constipation, diarrhea and vomiting  Genitourinary: Negative for dysuria     Musculoskeletal: Negative for neck pain and neck stiffness  Skin: Negative for rash  Neurological: Negative for syncope and headaches  Psychiatric/Behavioral: Negative for agitation and confusion  All other systems reviewed and are negative  Vitals:    02/05/20 1247   BP: 116/78   BP Location: Right arm   Patient Position: Sitting   Cuff Size: Large   Pulse: 58   Weight: 84 6 kg (186 lb 9 6 oz)   Height: 5' 9" (1 753 m)     Vitals:    02/05/20 1247   Weight: 84 6 kg (186 lb 9 6 oz)     Height: 5' 9" (175 3 cm)     Physical Exam:  Physical Exam   Constitutional: He is oriented to person, place, and time  He appears well-developed and well-nourished  No distress  HENT:   Head: Normocephalic and atraumatic  Eyes: Right eye exhibits no discharge  Left eye exhibits no discharge  Neck: No JVD present  No tracheal deviation present  Cardiovascular: Normal rate and regular rhythm  Exam reveals no friction rub  Pulmonary/Chest: Effort normal and breath sounds normal  No respiratory distress  He has no wheezes  Abdominal: Soft  Bowel sounds are normal  There is no tenderness  Musculoskeletal: He exhibits no edema or tenderness  Multiple Varicose veins in lower extremity    Neurological: He is alert and oriented to person, place, and time  Skin: Skin is warm and dry  He is not diaphoretic  Psychiatric: He has a normal mood and affect

## 2020-02-07 ENCOUNTER — TELEPHONE (OUTPATIENT)
Dept: HEMATOLOGY ONCOLOGY | Facility: CLINIC | Age: 65
End: 2020-02-07

## 2020-02-07 NOTE — TELEPHONE ENCOUNTER
Patient called to confirm their appointment  Appointment confirmed for Sameer Machado              Appointment date and time:  02/11 at 1:00pm              [facility] location:  Janell Ferrari            Patient verbalized understanding of above

## 2020-02-11 ENCOUNTER — CONSULT (OUTPATIENT)
Dept: HEMATOLOGY ONCOLOGY | Facility: CLINIC | Age: 65
End: 2020-02-11
Payer: COMMERCIAL

## 2020-02-11 VITALS
SYSTOLIC BLOOD PRESSURE: 136 MMHG | OXYGEN SATURATION: 98 % | HEIGHT: 69 IN | TEMPERATURE: 98.4 F | WEIGHT: 189.4 LBS | BODY MASS INDEX: 28.05 KG/M2 | RESPIRATION RATE: 16 BRPM | HEART RATE: 69 BPM | DIASTOLIC BLOOD PRESSURE: 80 MMHG

## 2020-02-11 DIAGNOSIS — I82.4Z2 ACUTE DEEP VEIN THROMBOSIS (DVT) OF DISTAL VEIN OF LEFT LOWER EXTREMITY (HCC): Primary | ICD-10-CM

## 2020-02-11 DIAGNOSIS — I26.99 OTHER PULMONARY EMBOLISM WITHOUT ACUTE COR PULMONALE, UNSPECIFIED CHRONICITY (HCC): ICD-10-CM

## 2020-02-11 LAB
CHOLEST SERPL-MCNC: 277 MG/DL (ref 100–199)
HDLC SERPL-MCNC: 62 MG/DL
LDLC SERPL CALC-MCNC: 187 MG/DL (ref 0–99)
SL AMB VLDL CHOLESTEROL CALC: 28 MG/DL (ref 5–40)
TRIGL SERPL-MCNC: 142 MG/DL (ref 0–149)

## 2020-02-11 PROCEDURE — 99204 OFFICE O/P NEW MOD 45 MIN: CPT | Performed by: PHYSICIAN ASSISTANT

## 2020-02-11 RX ORDER — MONTELUKAST SODIUM 4 MG/1
1 TABLET, CHEWABLE ORAL 2 TIMES DAILY
COMMUNITY

## 2020-02-13 ENCOUNTER — TELEPHONE (OUTPATIENT)
Dept: CARDIOLOGY CLINIC | Facility: CLINIC | Age: 65
End: 2020-02-13

## 2020-02-13 NOTE — TELEPHONE ENCOUNTER
----- Message from Valorie Lundborg, DO sent at 2/12/2020  3:42 PM EST -----  Please call patient and let him know that his cholesterol was high and we would recommend starting a medication call atorvastatin  Please find out a good time for me to call patient to discuss this medication with him

## 2020-02-13 NOTE — TELEPHONE ENCOUNTER
Called patient and made him aware  He verbalized understanding  Patient also stated Dr Lala Hopkins could call at any time today to discuss medication/results

## 2020-02-24 LAB
APTT SCREEN TO CONFIRM RATIO: 0.77 RATIO (ref 0–1.4)
AT III ACT/NOR PPP CHRO: 148 % (ref 75–135)
B2 GLYCOPROT1 IGG SER-ACNC: <9 GPI IGG UNITS (ref 0–20)
B2 GLYCOPROT1 IGM SER-ACNC: <9 GPI IGM UNITS (ref 0–32)
CARDIOLIPIN IGA SER IA-ACNC: <9 APL U/ML (ref 0–11)
CARDIOLIPIN IGG SER IA-ACNC: <9 GPL U/ML (ref 0–14)
CARDIOLIPIN IGM SER IA-ACNC: 11 MPL U/ML (ref 0–12)
CONFIRM APTT/NORMAL: 69.2 SEC (ref 0–55)
DRVVT IMM 1:2 NP PPP: 77.8 SEC (ref 0–47)
DRVVT SCREEN TO CONFIRM RATIO: 1.6 RATIO (ref 0.8–1.2)
F5 GENE MUT ANL BLD/T: ABNORMAL
F5 GENE MUT ANL BLD/T: NORMAL
GENE DIS ANL INTERP-IMP: NORMAL
GENE XXX MUT ANL BLD/T: NORMAL
INTERPRETATION: NORMAL
LA PPP-IMP: ABNORMAL
LABORATORY COMMENT REPORT: NORMAL
PROT C ACT/NOR PPP CHRO: 156 %
PROT S ACT/NOR PPP: 133 % (ref 57–157)
PROT S ACT/NOR PPP: 161 % (ref 63–140)
PROT S PPP-ACNC: 94 % (ref 60–150)
REF LAB TEST METHOD: NORMAL
REF LAB TEST METHOD: NORMAL
SCREEN APTT: 41.3 SEC (ref 0–51.9)
SCREEN DRVVT: 107.1 SEC (ref 0–47)
STONE ANALYSIS-IMP: NORMAL
THROMBIN TIME: 18.7 SEC (ref 0–23)

## 2020-02-28 ENCOUNTER — OFFICE VISIT (OUTPATIENT)
Dept: HEMATOLOGY ONCOLOGY | Facility: CLINIC | Age: 65
End: 2020-02-28
Payer: COMMERCIAL

## 2020-02-28 VITALS
TEMPERATURE: 98.1 F | DIASTOLIC BLOOD PRESSURE: 64 MMHG | BODY MASS INDEX: 28.14 KG/M2 | OXYGEN SATURATION: 97 % | WEIGHT: 190 LBS | SYSTOLIC BLOOD PRESSURE: 120 MMHG | RESPIRATION RATE: 16 BRPM | HEART RATE: 67 BPM | HEIGHT: 69 IN

## 2020-02-28 DIAGNOSIS — I82.4Z2 ACUTE DEEP VEIN THROMBOSIS (DVT) OF DISTAL VEIN OF LEFT LOWER EXTREMITY (HCC): Primary | ICD-10-CM

## 2020-02-28 DIAGNOSIS — R76.0 LUPUS ANTICOAGULANT POSITIVE: ICD-10-CM

## 2020-02-28 PROCEDURE — 99215 OFFICE O/P EST HI 40 MIN: CPT | Performed by: PHYSICIAN ASSISTANT

## 2020-02-28 NOTE — PATIENT INSTRUCTIONS
Please discontinue Xarelto on May 18th; follow up with blood work on June 1st     Please restart Xarelto AFTER completion of the blood test on June 1st

## 2020-02-28 NOTE — PROGRESS NOTES
[address] [facility] [address]  Hematology Ambulatory [address] [facility], 1955, [id number]  2/28/2020    Assessment/Plan:    1  Acute deep vein thrombosis (DVT) of distal vein of left lower extremity (HCC)  ***  - Lupus anticoagulant; Future    2  Lupus anticoagulant positive  ***  - Lupus anticoagulant; Future      The patient is scheduled for follow-up in approximately *** with Dr Chana tSarkey Patient*** voiced agreement and understanding to the above  Patient*** knows to call the Hematology/Oncology office with any questions and concerns regarding the above  I have spent *** minutes with {Patient /Family:73110} today in which greater than 50% of this time was spent in counseling/coordination of care regarding {AMB Counseling Topics:[id number]}  Barrier(s) to care: None***  The patient is *** able to self care   ------------------------------------------------------------------------------------------------------    Chief Complaint   Patient presents with    Follow-up       History of present illness:     Interval history:    Review of Systems    Patient Active Problem List   Diagnosis    Pulmonary [address]oli ([facility] [address]    Acute deep vein thrombosis (DVT) of left lower extremity ([address] )    Hypertension    Hyperlipidemia    Other chest pain    Obstructive sleep apnea    Dyspnea on exertion    Swelling of lower extremity       Past Medical History:   Diagnosis Date    DVT (deep venous thrombosis) ([address] )     LLE    Pulmonary emboli ([facility] [address] )        Past Surgical History:   Procedure Laterality Date    ROTATOR CUFF REPAIR      SINUS SURGERY         No family history on file      Social History     Socioeconomic History    Marital status: /Civil Union     Spouse name: None    Number of children: None    Years of education: None    Highest education level: None   Occupational History    None   Social Needs    Financial resource strain: None    Food insecurity:     Worry: None     Inability: None    Transportation needs:     Medical: None     Non-medical: None   Tobacco Use    Smoking status: Former Smoker     Packs/day: 1 00     Years: 15 00     Pack years: 15 00     Types: Cigarettes, Cigars     Last attempt to quit: 2000     Years since quittin 1    Smokeless tobacco: Never Used   Substance and Sexual Activity    Alcohol use: Yes     Comment: rare    Drug use: Never    Sexual activity: None   Lifestyle    Physical activity:     Days per week: None     Minutes per session: None    Stress: None   Relationships    Social connections:     Talks on phone: None     Gets together: None     Attends [employer] service: None     Active member of club or organization: None     Attends meetings of clubs or organizations: None     Relationship status: None    Intimate partner violence:     Fear of current or ex partner: None     Emotionally abused: None     Physically abused: None     Forced sexual activity: None   Other Topics Concern    None   Social History Narrative    None         Current Outpatient Medications:     colestipol (COLESTID) 1 g tablet, Take 1 g by mouth 2 (two) times a day, Disp: , Rfl:     cycloSPORINE (RESTASIS) 0 05 % ophthalmic emulsion, Restasis 0 05 % eye drops in a dropperette, Disp: , Rfl:     multivitamin (THERAGRAN) TABS, Take 1 tablet by mouth daily, Disp: , Rfl:     Omega-3 Fatty Acids (FISH OIL) 1,000 mg, Take 1,000 mg by mouth daily, Disp: , Rfl:     penicillin V potassium (VEETID) 500 mg tablet, penicillin V potassium 500 mg tablet, Disp: , Rfl:     rivaroxaban (XARELTO) 20 mg tablet, Take 20 mg by mouth, Disp: , Rfl:     albuterol (VENTOLIN HFA) 90 mcg/act inhaler, Inhale 2 puffs every 6 (six) hours as needed for wheezing (Patient not taking: Reported on 2020), Disp: 1 Inhaler, Rfl: 11    No Known Allergies    Objective:  /64 (BP Location: Left arm, Patient Position: Sitting, Cuff Size: Adult)   Pulse 67   Temp 98 1 °F (36 7 °C)   Resp 16   Ht 5' 9" (1 753 m)   Wt 86 2 kg (190 lb)   SpO2 97%   BMI 28 06 kg/m²    Physical Exam    Result Review  Labs:  ***  Imaging:   ***  Please note: This report has been generated by a voice recognition software system  Therefore there may be syntax, spelling, and/or grammatical errors  Please call if you have any questions

## 2020-02-28 NOTE — PROGRESS NOTES
[address] [facility] [address]  Hematology Ambulatory [address] [facility], 1955, [id number]  2/28/2020    Assessment/Plan:    1  Acute deep vein thrombosis (DVT) of distal vein of left lower extremity, 2  Lupus anticoagulant positive    Patient had a DVT and PE in December 2019  Patient was started on Xarelto and is tolerating it well with minimal side effects  Hypercoagulable workup demonstrated the presence of lupus anticoagulant  Patient will require repeat testing in order to diagnose antiphospholipid syndrome  Patient was advised to undergo repeat testing in May 2020 after being off of anticoagulation for a period of 2 weeks  Patient will follow-up in the office after and will restart anticoagulation after blood work is drawn  I spent some time with the patient today reviewing antiphospholipid syndrome and how this diagnosis may have implications as far as anticoagulation choice  To date, warfarin is preferred for individuals with   Antiphospholipid syndrome  S small study was completed that demonstrated individuals who have 2 or more abnormalities on antiphospholipid syndrome testing should continue on warfarin as this was found to be superior over NOACs  However, the study did not address individuals that only have 1 abnormality like Mr Marquez Mazachester  There been no clinical trials to assess if new oral anticoagulants are superior or just as efficacious as warfarin  if patient's anticoagulation testing returns negative for lupus anticoagulant at follow-up, reduction of anticoagulant to half dose, 10 mg of Xarelto daily would be initiated in June 2020  However if anticoagulation testing returned positive patient should remain on full-dose anticoagulation and perhaps consider transition to Coumadin  Patient voiced understanding and agreement to the above    Secondary to lack of availability on Friday as I will not be able to see the patient in follow-up  Patient will follow-up with Bishop Schmitz    - Lupus anticoagulant; Future    3  Heterozygous factor 5 Leiden   patient was found to be heterozygote  This does not increase risk for blood clots  We did discuss the implication that it might have on his brothers and sisters and other family members,   Patient does not have children  The patient is scheduled for follow-up in approximately 4 monhts  Patient voiced agreement and understanding to the above  Patient knows to call the Hematology/Oncology office with any questions and concerns regarding the above  Barrier(s) to care: None  The patient is able to self care   ------------------------------------------------------------------------------------------------------    Chief Complaint   Patient presents with    Follow-up       History of present illness: This is a 77-year-old male with past medical history of hypertension and hyperlipidemia with past medical history of left lower extremity DVT and nonocclusive pulmonary emboli within the right lower lobe and lingular segmental branches found on 11/18/19      Patient notes history started approximately 1 week prior  Patient noted that he had some pain in his lower extremities bilaterally  Patient had noted that he went home that night in felt some shortness of breath but this resolved spontaneously  Patient went to the emergency room in November secondary to continued chest pain and shortness of breath  Patient was worried about having a heart attack  Patient also had lower extremity pain  Ultrasound demonstrated an acute occlusive deep vein thrombosis of the peroneal veins      Patient was started on Xarelto and was discharged    Patient denies provoking feature including recent travel, trauma, change in activity level and family history      In January, patient went back to the emergency room secondary to long flight, with associated chest pain worried about recurrence of pulmonary embolism  D-dimer was negative and chest x-ray was completed and was also negative      Patient notes that he has been compliant on Xarelto  He notes that he has a rash on his upper back with associated pruritus  Patient has been using topical agents to help with this  Patient denies other side effects to Xarelto such as bleeding  Hypercoagulable workup was completed and was positive for  Heterozygote factor 5 Leiden mutation and positive lupus anticoagulant  Of note, [facility] did not draw prothrombin gene mutation  Interval history:    Patient reports occasional nose bleed secondary to dry conditions at home  Patient also notes that he wears a BiPAP machine  There  Humidity added to the machine  Patient had worse nose bleeds prior to the administration of Xarelto on BiPAP in the past     Review of Systems   Constitutional: Negative for activity change, appetite change, fatigue and fever  HENT: Negative for nosebleeds  Respiratory: Negative for cough, choking and shortness of breath  Cardiovascular: Negative for chest pain, palpitations and leg swelling  Gastrointestinal: Negative for abdominal distention, abdominal pain, anal bleeding, blood in stool, constipation, diarrhea, nausea and vomiting  Endocrine: Negative for cold intolerance  Genitourinary: Negative for hematuria  Musculoskeletal: Negative for myalgias  Skin: Negative for color change, pallor and rash  Allergic/Immunologic: Negative for immunocompromised state  Neurological: Negative for headaches  Hematological: Negative for adenopathy  Does not bruise/bleed easily  All other systems reviewed and are negative        Patient Active Problem List   Diagnosis    Pulmonary emboli (HCC)    Acute deep vein thrombosis (DVT) of left lower extremity (HCC)    Hypertension    Hyperlipidemia    Other chest pain    Obstructive sleep apnea    Dyspnea on exertion    Swelling of lower extremity       Past Medical History:   Diagnosis Date    DVT [facility]deep venous thrombosis) [facility])     LLE    Pulmonary emboli (HCC)        Past Surgical History:   Procedure Laterality Date    ROTATOR CUFF REPAIR      SINUS SURGERY         No family history on file      Social History     Socioeconomic History    Marital status: /Civil Union     Spouse name: None    Number of children: None    Years of education: None    Highest education level: None   Occupational History    None   Social Needs    Financial resource strain: None    Food insecurity:     Worry: None     Inability: None    Transportation needs:     Medical: None     Non-medical: None   Tobacco Use    Smoking status: Former Smoker     Packs/day:  00     Years: 15      Pack years: 15      Types: Cigarettes, Cigars     Last attempt to quit:      Years since quittin 1    Smokeless tobacco: Never Used   Substance and Sexual Activity    Alcohol use: Yes     Comment: rare    Drug use: Never    Sexual activity: None   Lifestyle    Physical activity:     Days per week: None     Minutes per session: None    Stress: None   Relationships    Social connections:     Talks on phone: None     Gets together: None     Attends Jainism service: None     Active member of club or organization: None     Attends meetings of clubs or organizations: None     Relationship status: None    Intimate partner violence:     Fear of current or ex partner: None     Emotionally abused: None     Physically abused: None     Forced sexual activity: None   Other Topics Concern    None   Social History Narrative    None         Current Outpatient Medications:     colestipol (COLESTID) 1 g tablet, Take 1 g by mouth 2 (two) times a day, Disp: , Rfl:     cycloSPORINE (RESTASIS) 0 05 % ophthalmic emulsion, Restasis 0 05 % eye drops in a dropperette, Disp: , Rfl:     multivitamin (THERAGRAN) TABS, Take 1 tablet by mouth daily, Disp: , Rfl:     Omega-3 Fatty Acids (FISH OIL) 1,000 mg, Take 1,000 mg by mouth daily, Disp: , Rfl:     penicillin V potassium (VEETID) 500 mg tablet, penicillin V potassium 500 mg tablet, Disp: , Rfl:     rivaroxaban (XARELTO) 20 mg tablet, Take 20 mg by mouth, Disp: , Rfl:     albuterol (VENTOLIN HFA) 90 mcg/act inhaler, Inhale 2 puffs every 6 (six) hours as needed for wheezing (Patient not taking: Reported on 2/5/2020), Disp: 1 Inhaler, Rfl: 11    No Known Allergies    Objective:  /64 (BP Location: Left arm, Patient Position: Sitting, Cuff Size: Adult)   Pulse 67   Temp 98 1 °F (36 7 °C)   Resp 16   Ht 5' 9" (1 753 m)   Wt 86 2 kg (190 lb)   SpO2 97%   BMI 28 06 kg/m²    Physical Exam   Constitutional: He is oriented to person, place, and time  He appears well-developed and well-nourished  No distress  HENT:   Head: Normocephalic and atraumatic  Right Ear: External ear normal    Left Ear: External ear normal    Nose: Nose normal    Eyes: Conjunctivae are normal  No scleral icterus  Cardiovascular: Normal rate  Pulmonary/Chest: No respiratory distress  Abdominal: Soft  He exhibits no distension  Musculoskeletal: He exhibits no edema  Neurological: He is alert and oriented to person, place, and time  Skin: No rash (on exposed skin ) noted  Psychiatric: Thought content normal        Result Review  Labs:  Orders Only on 02/12/2020   Component Date Value Ref Range Status    DILUTE PROTHROMBIN TIME(DPT) 02/12/2020 69 2* 0 0 - 55 0 sec Final    DPT CONFIRM RATIO 02/12/2020 0 77  0 00 - 1 40 Ratio Final    THROMBIN TIME (DRVW) 02/12/2020 18 7  0 0 - 23 0 sec Final    PTT Lupus Anticoagulant 02/12/2020 41 3  0 0 - 51 9 sec Final    Dilute Viper Venom Time 02/12/2020 107 1* 0 0 - 47 0 sec Final    LUPUS REFLEX INTERPRETATION 02/12/2020 Comment:   Final    Comment: Results are consistent with the presence of a lupus anticoagulant    NOTE: Only persistent lupus anticoagulants are thought to be of clinical  significance  For this reason, repeat testing in 12 or more weeks after an  initial positive result should be considered to confirm or refute the  presence of a lupus anticoagulant, depending on clinical presentation  Results of lupus anticoagulant tests may be falsely positive in the  presence of certain anticoagulant therapies   dRVVT Mix Interp  02/12/2020 77 8* 0 0 - 47 0 sec Final    DRVVT/Confirm Ratio 02/12/2020 1 6* 0 8 - 1 2 ratio Final    Factor V Result 02/12/2020 Comment   Final    Comment: G-A (Normal-Mutant)  Positive - Heterozygous for factor V Leiden mutation  Interpretation and review of Factor V Leiden performed by  Lorri Zapien MS, PhD, [facility]       METHODOLOGY 02/12/2020 Comment   Final    Comment: Patient DNA was evaluated for the factor V Leiden mutation  at nucleotide 1691 using allele specific PCR technology  followed by gel electrophoresis   Interpretation 02/12/2020 Comment   Final    Comment: The factor V Leiden mutation is present on one copy of the  patient's factor V gene  Presence of the heterozygous  factor V Leiden mutation confers an approximate 5-fold  increase in the risk of venous thrombosis   Comments 02/12/2020 Comment   Final    Comment: Simultaneous Risks: If a patient possesses two or more  congenital or acquired thrombophilic risk factors, the risk  of thrombosis may rise to more than the sum of the risk  ratios for the individual risk factors  For instance, a  combination of the prothrombin [id number] mutation and the  factor V Leiden mutation may confer an increase in  thrombotic risk in the range of 20-30 fold  Recommendations for Genetic Counseling: The factor V Leiden  mutation is an inherited characteristic  If the mutation is  present, we recommend that the patient and their family  consider genetic counseling to obtain additional  information on inheritance and to identify other family  members at risk    Testing Characteristics: Genetic testing provides  exceptionally high sensitivity and specificity  Inaccurate  results are limited to rare polymorphisms in primer binding  sites and to misidentification of specimens by collectors  or laboratory personnel  This assay detects only the factor  V Leiden mutation a                           nd does not detect other genetic  abnormalities  This test was developed and its performance characteristics  determined by [facility]  It has not been cleared or approved  by the Food and Drug Administration  References:  Orin SCRUGGS, et al  Nguyen Helling  7922;50:443  Ej Oreilly and Angi Yee  2001;6(3):201  Gretchen Herrera et al  Nguyen Helling  7516;32:173-92  Hayden STORM et al  Nguyen Helling  4450;09:954       FV R2 DNA ADRIANO 02/12/2020 Comment   Final    Comment: A-A (Normal-Normal)  No factor V R2 polymorphism present   METHODOLOGY 02/12/2020 Comment   Final    Comment: Patient DNA was evaluated for the factor V R2 polymorphism  at nucleotide 4070 using polymerase chain reaction (PCR)  and restriction fragment length polymorphism (RFLP)  technology   INTERPRETATION 02/12/2020 Comment   Final    Comment: While the patient does not possess this risk factor, other  thrombotic risk factors may be detected through systematic  clinical laboratory analysis   Comment 02/12/2020 Comment   Final    Comment: Simultaneous Risks: If a patient possesses two or more  congenital or acquired thrombophilic risk factors, the risk  of thrombosis may rise to more than the sum of the risk  ratios for the individual risk factors  For instance, a  combination of the factor V R2 polymorphism and the factor  V Leiden mutation may confer a 16-fold increase in  thrombotic risk over that conferred by the presence of an  isolated heterozygous factor V Leiden mutation  Recommendations: The factor V R2 polymorphism is an  inherited characteristic   If the mutation is present, we  recommend that the patient and their family consider  genetic counseling to obtain additional information on  inheritance and to identify other family members at risk  In the heterozygous individual  to a wild-type  individual, their children have a 50% chance of inheriting  this mutation  All children inherit at least one abnormal  gene if the tested individual is homozygous  Testing Characteristics: Genetic testing by PCR p                           anais  exceptionally high sensitivity and specificity  Inaccurate  results using PCR are limited to rare polymorphisms in  primer binding sites and to misidentification of specimens  by collectors or laboratory personnel  This assay detects  only the factor V R2 polymorphism at nucleotide 4070 and  does not measure genetic abnormalities elsewhere in the  genome  This test was developed and its performance characteristics  determined by [facility]  It has not been cleared or approved  by the Food and Drug Administration  For inquiries or genetic consultation please call [facility]  at [phone]  References: Allegra et al  Haematologica 5403;75:7160  Nathaniel-Hilario et al  [facility]is [place] [facility] 1999;81:193  [place] Martjamar  et al  Patricia Pyo 3649;50:468  Federico Henri CORREA et al  Blood  2021;71:6332  Castoldi E et al  Blood 8559;340:1068        Anticardiolipin IgG 02/12/2020 <9  0 - 14 GPL U/mL Final    Comment:                           Negative:              <15                            Indeterminate:     15 - 20                            Low-Med Positive: >20 - 80                            High Positive:         >80      Anticardiolipin IgM 02/12/2020 11  0 - 12 MPL U/mL Final    Comment:                           Negative:              <13                            Indeterminate:     13 - 20                            Low-Med Positive: >20 - 80                            High Positive:         >80      Anticardiolipin IgA 02/12/2020 <9  0 - 11 APL U/mL Final    Comment: Negative:              <12                            Indeterminate:     12 - 20                            Low-Med Positive: >20 - 80                            High Positive:         >80      Beta-2 Glyco 1 IgG 02/12/2020 <9  0 - 20 GPI IgG units Final    Comment: The reference interval reflects a 3SD or 99th percentile interval,  which is thought to represent a potentially clinically significant  result in accordance with the International Consensus Statement on  the classification criteria for definitive antiphospholipid syndrome  (APS)  J Thromb Haem 2006;4:295-306   Beta-2 Glyco 1 IgM 02/12/2020 <9  0 - 32 GPI IgM units Final    Comment: The reference interval reflects a 3SD or 99th percentile interval,  which is thought to represent a potentially clinically significant  result in accordance with the International Consensus Statement on  the classification criteria for definitive antiphospholipid syndrome  (APS)  J Thromb Haem 2006;4:295-306   Protein S Ag, Total 02/12/2020 94  60 - 150 % Final    Comment: This test was developed and its performance characteristics  determined by [facility]  It has not been cleared or approved  by the Food and Drug Administration   Protein S Ag, Free 02/12/2020 133  57 - 157 % Final    Comment: This test was developed and its performance characteristics  determined by [facility]  It has not been cleared or approved  by the Food and Drug Administration   Protein S Activity 02/12/2020 161* 63 - 140 % Final    Comment: An elevated protein S activity is of no known clinical significance  Protein S activity may be falsely increased (masking an abnormal, low  result) in patients receiving direct Xa inhibitor (e g , rivaroxaban,  apixaban, edoxaban) or a direct thrombin inhibitor (e g , dabigatran)  anticoagulant treatment due to assay interference by these drugs        Prot C Activity (Chromogenic) 02/12/2020 156  % Final    Comment: Reference Range:  17 years and older: 68 - 80      Factor V Leiden 02/12/2020 Comment*  Final    Comment: RESULT: SINGLE R506Q MUTATION IDENTIFIED (HETEROZYGOTE)  Factor V Leiden is a specific mutation (R506Q) in the factor V gene  that is associated with an increased risk of venous thrombosis  Factor V Leiden is more resistant to inactivation by activated protein  C  As a result, factor V persists in the circulation leading to a  mild hypercoagulable state  Factor V Leiden has been reported in  patients with deep vein thrombosis, pulmonary embolus, central retinal  vein occulsion, cerebral sinus thrombosis, and hepatic vein thrombosis  The relative risk of venous thrombosis is increased approximately 4-8  fold in individuals who are heterozygous  About 3-8% of the general  [place] and  population are heterozygous  The risk of venous  thrombosis increases exponentially in patients with more than one risk  factor, including:  age, surgery, oral contraceptive use, pregnancy,  elevated homocysteine levels, or a Factor II/prothrombin mutation  ([id number])  Additionally, for individuals                            found to be heterozygous for  the Factor V Leiden mutation, presence of a second mutation,  Factor V R2, further increases the risk if venous thrombosis  Contact  [employer] Customer Service at [phone] for further  information on both the Factor II (Prothrombin) DNA Analysis, and  Factor V R2 DNA Analysis tests  **Genetic counselors are available for health care providers to**    discuss results at [phone]  Methodology:  DNA analysis of the Factor V gene was performed by allele-specific  PCR  The diagnostic sensitivity and specificity is >99% for both  Molecular-based testing is highly accurate, but as in any laboratory  test, diagnostic errors may occur  All test results must be combined  with clinical information for the most accurate interpretation    This test was developed and its performance characteristics determined  by [facility]  It has not been cleared or approved by the Food and Drug  Administration  References:  Temo SOLANO (1996)  Clin Lab                            Med 99:405-344  Carline Ortiz, PhD, Sean Castle, PhD, ESTER Luu , PhD, Swetha Garcia, PhD, Antwon Vang, PhD, Marcial Chambers PhD, [facility]       Antithrombin Activity 02/12/2020 148* 75 - 135 % Final    Comment: An elevated antithrombin activity is of no known clinical  significance  Direct Xa inhibitor anticoagulants such as rivaroxaban,  apixaban and edoxaban will lead to spuriously elevated antithrombin  activity levels possibly masking a deficiency  Please note: This report has been generated by a voice recognition software system  Therefore there may be syntax, spelling, and/or grammatical errors  Please call if you have any questions

## 2020-03-19 ENCOUNTER — TELEPHONE (OUTPATIENT)
Dept: PULMONOLOGY | Facility: CLINIC | Age: 65
End: 2020-03-19

## 2020-03-19 NOTE — TELEPHONE ENCOUNTER
Due to the current pandemic of COVID-19 Patient was given the option to par take in a video/ telephone call which was accepted by the patient  Patient was informed via telephone the following: There is a possibility of a $27 fee to participate in this Virtual Visit  This is depending of your insurance company and if they will cover that cost     Patients preferred phone number to contact: [phone]    Video option -Patients preferred e-mail: [email or web address]      Patient informed that they will receive an e-mail 15 minutes before their scheduled time as a reminder             Any further questions patients may have they are informed to call [phone]

## 2020-05-14 ENCOUNTER — HOSPITAL ENCOUNTER (OUTPATIENT)
Dept: NON INVASIVE DIAGNOSTICS | Facility: CLINIC | Age: 65
Discharge: HOME | End: 2020-05-14
Attending: INTERNAL MEDICINE
Payer: COMMERCIAL

## 2020-05-14 ENCOUNTER — HOSPITAL ENCOUNTER (OUTPATIENT)
Dept: PULMONOLOGY | Facility: HOSPITAL | Age: 65
Discharge: HOME | End: 2020-05-14
Attending: INTERNAL MEDICINE
Payer: COMMERCIAL

## 2020-05-14 DIAGNOSIS — I27.82 OTHER CHRONIC PULMONARY EMBOLISM WITHOUT ACUTE COR PULMONALE (HCC): ICD-10-CM

## 2020-05-14 DIAGNOSIS — R06.00 DYSPNEA ON EXERTION: ICD-10-CM

## 2020-05-14 PROCEDURE — 94726 PLETHYSMOGRAPHY LUNG VOLUMES: CPT

## 2020-05-14 PROCEDURE — 93306 TTE W/DOPPLER COMPLETE: CPT | Performed by: INTERNAL MEDICINE

## 2020-05-14 PROCEDURE — 94760 N-INVAS EAR/PLS OXIMETRY 1: CPT

## 2020-05-14 PROCEDURE — 94010 BREATHING CAPACITY TEST: CPT

## 2020-05-14 PROCEDURE — 94729 DIFFUSING CAPACITY: CPT | Performed by: INTERNAL MEDICINE

## 2020-05-14 PROCEDURE — 94729 DIFFUSING CAPACITY: CPT

## 2020-05-14 PROCEDURE — 94010 BREATHING CAPACITY TEST: CPT | Performed by: INTERNAL MEDICINE

## 2020-05-14 PROCEDURE — 93306 TTE W/DOPPLER COMPLETE: CPT

## 2020-05-14 PROCEDURE — 94726 PLETHYSMOGRAPHY LUNG VOLUMES: CPT | Performed by: INTERNAL MEDICINE

## 2020-05-27 ENCOUNTER — TELEMEDICINE (OUTPATIENT)
Dept: CARDIOLOGY CLINIC | Facility: CLINIC | Age: 65
End: 2020-05-27
Payer: COMMERCIAL

## 2020-05-27 DIAGNOSIS — E78.5 HYPERLIPIDEMIA, UNSPECIFIED HYPERLIPIDEMIA TYPE: ICD-10-CM

## 2020-05-27 DIAGNOSIS — R07.89 ATYPICAL CHEST PAIN: Primary | ICD-10-CM

## 2020-05-27 DIAGNOSIS — I10 HYPERTENSION, UNSPECIFIED TYPE: ICD-10-CM

## 2020-05-27 DIAGNOSIS — I26.99 PULMONARY EMBOLISM, OTHER, UNSPECIFIED CHRONICITY, UNSPECIFIED WHETHER ACUTE COR PULMONALE PRESENT (HCC): ICD-10-CM

## 2020-05-27 PROCEDURE — 99213 OFFICE O/P EST LOW 20 MIN: CPT | Performed by: INTERNAL MEDICINE

## 2020-06-05 ENCOUNTER — TELEPHONE (OUTPATIENT)
Dept: PULMONOLOGY | Facility: CLINIC | Age: 65
End: 2020-06-05

## 2020-06-05 LAB
APTT SCREEN TO CONFIRM RATIO: 0.9 RATIO (ref 0–1.4)
CONFIRM APTT/NORMAL: 35.2 SEC (ref 0–55)
LA PPP-IMP: NORMAL
SCREEN APTT: 28 SEC (ref 0–51.9)
SCREEN DRVVT: 35.7 SEC (ref 0–47)
THROMBIN TIME: 18.2 SEC (ref 0–23)

## 2020-06-08 ENCOUNTER — TELEMEDICINE (OUTPATIENT)
Dept: PULMONOLOGY | Facility: CLINIC | Age: 65
End: 2020-06-08
Payer: COMMERCIAL

## 2020-06-08 VITALS — HEIGHT: 69 IN | WEIGHT: 180 LBS | BODY MASS INDEX: 26.66 KG/M2

## 2020-06-08 DIAGNOSIS — G47.33 OBSTRUCTIVE SLEEP APNEA: Primary | ICD-10-CM

## 2020-06-08 DIAGNOSIS — R06.00 DYSPNEA ON EXERTION: ICD-10-CM

## 2020-06-08 DIAGNOSIS — I26.99 PULMONARY EMBOLISM, OTHER, UNSPECIFIED CHRONICITY, UNSPECIFIED WHETHER ACUTE COR PULMONALE PRESENT (HCC): ICD-10-CM

## 2020-06-08 PROCEDURE — 99214 OFFICE O/P EST MOD 30 MIN: CPT | Performed by: INTERNAL MEDICINE

## 2020-06-10 ENCOUNTER — TELEPHONE (OUTPATIENT)
Dept: HEMATOLOGY ONCOLOGY | Facility: CLINIC | Age: 65
End: 2020-06-10

## 2020-06-10 ENCOUNTER — HOSPITAL ENCOUNTER (OUTPATIENT)
Dept: CT IMAGING | Facility: HOSPITAL | Age: 65
Discharge: HOME | End: 2020-06-10
Payer: COMMERCIAL

## 2020-06-10 ENCOUNTER — TELEPHONE (OUTPATIENT)
Dept: PULMONOLOGY | Facility: CLINIC | Age: 65
End: 2020-06-10

## 2020-06-10 DIAGNOSIS — E78.5 HYPERLIPIDEMIA, UNSPECIFIED HYPERLIPIDEMIA TYPE: ICD-10-CM

## 2020-06-10 NOTE — TELEPHONE ENCOUNTER
Patient called requesting lab results   Lupus anticoagulant   Labs draw date:6/1/2020  Labs drawn at: [facility]   Patient's call back # [phone]    * Patient requested results mailed to him as well

## 2020-06-11 NOTE — TELEPHONE ENCOUNTER
Patient has upcoming appt  There are no further recommendations we need to provide the patient at this time  Will discuss at visit

## 2020-06-11 NOTE — TELEPHONE ENCOUNTER
Called patient and informed him of the information per KIANA Ríos  Patient was also informed of appt date, time, and location of his upcoming appt

## 2020-06-13 LAB
CHOLEST SERPL-MCNC: 248 MG/DL (ref 100–199)
HDLC SERPL-MCNC: 58 MG/DL
LDLC SERPL CALC-MCNC: 156 MG/DL (ref 0–99)
TRIGL SERPL-MCNC: 169 MG/DL (ref 0–149)

## 2020-06-15 ENCOUNTER — TELEPHONE (OUTPATIENT)
Dept: PULMONOLOGY | Facility: CLINIC | Age: 65
End: 2020-06-15

## 2020-06-17 ENCOUNTER — TELEPHONE (OUTPATIENT)
Dept: CARDIOLOGY CLINIC | Facility: CLINIC | Age: 65
End: 2020-06-17

## 2020-06-23 ENCOUNTER — TELEPHONE (OUTPATIENT)
Dept: OTHER | Facility: OTHER | Age: 65
End: 2020-06-23

## 2020-06-23 ENCOUNTER — TELEPHONE (OUTPATIENT)
Dept: HEMATOLOGY ONCOLOGY | Facility: CLINIC | Age: 65
End: 2020-06-23

## 2020-06-23 ENCOUNTER — TELEPHONE (OUTPATIENT)
Dept: CARDIOLOGY CLINIC | Facility: CLINIC | Age: 65
End: 2020-06-23

## 2020-06-23 DIAGNOSIS — R07.89 ATYPICAL CHEST PAIN: Primary | ICD-10-CM

## 2020-06-23 LAB
ANA TITR SER IF: NEGATIVE {TITER}
BASOPHILS # BLD AUTO: 0.1 X10E3/UL (ref 0–0.2)
BASOPHILS NFR BLD AUTO: 1 %
DSDNA AB SER-ACNC: 7 IU/ML (ref 0–9)
EOSINOPHIL # BLD AUTO: 0.2 X10E3/UL (ref 0–0.4)
EOSINOPHIL NFR BLD AUTO: 3 %
ERYTHROCYTE [DISTWIDTH] IN BLOOD BY AUTOMATED COUNT: 12.4 % (ref 11.6–15.4)
HCT VFR BLD AUTO: 46.6 % (ref 37.5–51)
HGB BLD-MCNC: 15.7 G/DL (ref 13–17.7)
HISTONE IGG SER IA-ACNC: 0.3 UNITS (ref 0–0.9)
IMM GRANULOCYTES # BLD: 0 X10E3/UL (ref 0–0.1)
IMM GRANULOCYTES NFR BLD: 0 %
LYMPHOCYTES # BLD AUTO: 2 X10E3/UL (ref 0.7–3.1)
LYMPHOCYTES NFR BLD AUTO: 32 %
MCH RBC QN AUTO: 30.5 PG (ref 26.6–33)
MCHC RBC AUTO-ENTMCNC: 33.7 G/DL (ref 31.5–35.7)
MCV RBC AUTO: 91 FL (ref 79–97)
MONOCYTES # BLD AUTO: 0.7 X10E3/UL (ref 0.1–0.9)
MONOCYTES NFR BLD AUTO: 11 %
NEUTROPHILS # BLD AUTO: 3.2 X10E3/UL (ref 1.4–7)
NEUTROPHILS NFR BLD AUTO: 53 %
PLATELET # BLD AUTO: 192 X10E3/UL (ref 150–450)
RBC # BLD AUTO: 5.14 X10E6/UL (ref 4.14–5.8)
WBC # BLD AUTO: 6.2 X10E3/UL (ref 3.4–10.8)

## 2020-06-24 ENCOUNTER — TELEPHONE (OUTPATIENT)
Dept: OTHER | Facility: OTHER | Age: 65
End: 2020-06-24

## 2020-06-25 ENCOUNTER — TELEPHONE (OUTPATIENT)
Dept: HEMATOLOGY ONCOLOGY | Facility: CLINIC | Age: 65
End: 2020-06-25

## 2020-06-26 ENCOUNTER — OFFICE VISIT (OUTPATIENT)
Dept: HEMATOLOGY ONCOLOGY | Facility: CLINIC | Age: 65
End: 2020-06-26
Payer: COMMERCIAL

## 2020-06-26 VITALS
DIASTOLIC BLOOD PRESSURE: 79 MMHG | BODY MASS INDEX: 26.96 KG/M2 | HEIGHT: 69 IN | RESPIRATION RATE: 18 BRPM | TEMPERATURE: 99.4 F | WEIGHT: 182 LBS | OXYGEN SATURATION: 97 % | SYSTOLIC BLOOD PRESSURE: 120 MMHG | HEART RATE: 85 BPM

## 2020-06-26 DIAGNOSIS — Z12.5 SCREENING FOR PROSTATE CANCER: Primary | ICD-10-CM

## 2020-06-26 DIAGNOSIS — Z12.11 SCREENING FOR COLON CANCER: ICD-10-CM

## 2020-06-26 DIAGNOSIS — I26.99 OTHER PULMONARY EMBOLISM WITHOUT ACUTE COR PULMONALE, UNSPECIFIED CHRONICITY (HCC): ICD-10-CM

## 2020-06-26 DIAGNOSIS — I82.4Z2 ACUTE DEEP VEIN THROMBOSIS (DVT) OF DISTAL VEIN OF LEFT LOWER EXTREMITY (HCC): ICD-10-CM

## 2020-06-26 PROCEDURE — 99214 OFFICE O/P EST MOD 30 MIN: CPT | Performed by: PHYSICIAN ASSISTANT

## 2020-07-10 DIAGNOSIS — G47.33 OBSTRUCTIVE SLEEP APNEA: ICD-10-CM

## 2020-07-10 PROCEDURE — U0003 INFECTIOUS AGENT DETECTION BY NUCLEIC ACID (DNA OR RNA); SEVERE ACUTE RESPIRATORY SYNDROME CORONAVIRUS 2 (SARS-COV-2) (CORONAVIRUS DISEASE [COVID-19]), AMPLIFIED PROBE TECHNIQUE, MAKING USE OF HIGH THROUGHPUT TECHNOLOGIES AS DESCRIBED BY CMS-2020-01-R: HCPCS

## 2020-07-16 LAB — SARS-COV-2 RNA SPEC QL NAA+PROBE: NOT DETECTED

## 2020-07-17 ENCOUNTER — TELEPHONE (OUTPATIENT)
Dept: OTHER | Facility: OTHER | Age: 65
End: 2020-07-17

## 2020-07-17 ENCOUNTER — HOSPITAL ENCOUNTER (OUTPATIENT)
Dept: PULMONOLOGY | Facility: HOSPITAL | Age: 65
Discharge: HOME | End: 2020-07-17
Attending: INTERNAL MEDICINE
Payer: COMMERCIAL

## 2020-07-17 DIAGNOSIS — R06.00 DYSPNEA ON EXERTION: ICD-10-CM

## 2020-07-17 DIAGNOSIS — G47.33 OBSTRUCTIVE SLEEP APNEA: Primary | ICD-10-CM

## 2020-07-17 PROCEDURE — 94729 DIFFUSING CAPACITY: CPT | Performed by: INTERNAL MEDICINE

## 2020-07-17 PROCEDURE — 94726 PLETHYSMOGRAPHY LUNG VOLUMES: CPT | Performed by: INTERNAL MEDICINE

## 2020-07-17 PROCEDURE — 94010 BREATHING CAPACITY TEST: CPT

## 2020-07-17 PROCEDURE — 94729 DIFFUSING CAPACITY: CPT

## 2020-07-17 PROCEDURE — 94010 BREATHING CAPACITY TEST: CPT | Performed by: INTERNAL MEDICINE

## 2020-07-17 PROCEDURE — 94760 N-INVAS EAR/PLS OXIMETRY 1: CPT

## 2020-07-17 PROCEDURE — 94726 PLETHYSMOGRAPHY LUNG VOLUMES: CPT

## 2020-07-17 NOTE — TELEPHONE ENCOUNTER
Your test for COVID-19, also known as novel coronavirus, came back negative  You do not have COVID-19  If you have any additional questions, we can schedule a virtual visit for you with a provider or call the [facility] [email or web address] [phone] Option 7 for care advice  For additional information , please visit the Coronavirus FAQ on the [address] [address]  ([facility])

## 2020-07-17 NOTE — TELEPHONE ENCOUNTER
The patient was called for notification of a NEGATIVE test result for COVID-19  The patient did not answer the phone and a voicemail was left requesting a call back to [phone], Option 7

## 2020-07-30 ENCOUNTER — OFFICE VISIT (OUTPATIENT)
Dept: GASTROENTEROLOGY | Facility: AMBULARY SURGERY CENTER | Age: 65
End: 2020-07-30
Payer: COMMERCIAL

## 2020-07-30 VITALS
HEIGHT: 69 IN | DIASTOLIC BLOOD PRESSURE: 78 MMHG | WEIGHT: 185 LBS | HEART RATE: 82 BPM | BODY MASS INDEX: 27.4 KG/M2 | TEMPERATURE: 98.2 F | SYSTOLIC BLOOD PRESSURE: 126 MMHG

## 2020-07-30 DIAGNOSIS — Z86.010 HISTORY OF COLON POLYPS: Primary | ICD-10-CM

## 2020-07-30 DIAGNOSIS — Z12.11 SCREENING FOR COLON CANCER: ICD-10-CM

## 2020-07-30 DIAGNOSIS — Z79.01 ANTICOAGULANT LONG-TERM USE: ICD-10-CM

## 2020-07-30 PROBLEM — Z86.0100 HISTORY OF COLON POLYPS: Status: ACTIVE | Noted: 2020-07-30

## 2020-07-30 PROCEDURE — 99244 OFF/OP CNSLTJ NEW/EST MOD 40: CPT | Performed by: INTERNAL MEDICINE

## 2020-07-30 NOTE — H&P (VIEW-ONLY)
Consultation - [address] [facility]  Paramjit Sat 1955 male         Chief Complaint:  For colonoscopy    HPI:  26-year-old male with history of DVT, pulmonary embolism, factor 5 Leiden mutation, positive lupus anticoagulant on anticoagulation therapy with Xarelto was referred for colonoscopy  He had colonoscopy about 6 years ago and was told about colon polyps  Patient has regular bowel movements and denies any blood or mucus in the stool  Appetite is good and denies any recent weight loss  Denies any abdominal pain, nausea, or vomiting  Has no heartburn or acid reflux  Denies any difficulty swallowing  REVIEW OF SYSTEMS: Review of Systems   Constitutional: Negative for activity change, appetite change, chills, diaphoresis, fatigue, fever and unexpected weight change  HENT: Negative for ear discharge, ear pain, facial swelling, hearing loss, nosebleeds, sore throat, tinnitus and voice change  Eyes: Negative for pain, discharge, redness, itching and visual disturbance  Respiratory: Negative for apnea, cough, chest tightness, shortness of breath and wheezing  Cardiovascular: Negative for chest pain and palpitations  Gastrointestinal:        As noted in HPI   Endocrine: Negative for cold intolerance, heat intolerance and polyuria  Genitourinary: Negative for difficulty urinating, dysuria, flank pain, hematuria and urgency  Musculoskeletal: Positive for arthralgias  Negative for back pain, gait problem, joint swelling and myalgias  Skin: Negative for rash and wound  Neurological: Negative for dizziness, tremors, seizures, speech difficulty, light-headedness, numbness and headaches  Hematological: Negative for adenopathy  Does not bruise/bleed easily  Psychiatric/Behavioral: Negative for agitation, behavioral problems and confusion  The patient is not nervous/anxious           Past Medical History:   Diagnosis Date    DVT (deep venous thrombosis) (HCC)     LLE    Pulmonary emboli (HCC)       Past Surgical History:   Procedure Laterality Date    ROTATOR CUFF REPAIR      SINUS SURGERY       Social History     Socioeconomic History    Marital status: /Civil Union     Spouse name: Not on file    Number of children: Not on file    Years of education: Not on file    Highest education level: Not on file   Occupational History    Not on file   Social Needs    Financial resource strain: Not on file    Food insecurity:     Worry: Not on file     Inability: Not on file    Transportation needs:     Medical: Not on file     Non-medical: Not on file   Tobacco Use    Smoking status: Former Smoker     Packs/day:      Years: 15      Pack years: 15      Types: Cigarettes, Cigars     Last attempt to quit:      Years since quittin 5    Smokeless tobacco: Never Used   Substance and Sexual Activity    Alcohol use: Yes     Frequency: Monthly or less     Comment: rare    Drug use: Never    Sexual activity: Not on file   Lifestyle    Physical activity:     Days per week: Not on file     Minutes per session: Not on file    Stress: Not on file   Relationships    Social connections:     Talks on phone: Not on file     Gets together: Not on file     Attends Sabianist service: Not on file     Active member of club or organization: Not on file     Attends meetings of clubs or organizations: Not on file     Relationship status: Not on file    Intimate partner violence:     Fear of current or ex partner: Not on file     Emotionally abused: Not on file     Physically abused: Not on file     Forced sexual activity: Not on file   Other Topics Concern    Not on file   Social History Narrative    Not on file     History reviewed  No pertinent family history  Patient has no known allergies    Current Outpatient Medications   Medication Sig Dispense Refill    albuterol (VENTOLIN HFA) 90 mcg/act inhaler Inhale 2 puffs every 6 (six) hours as needed for wheezing 1 Inhaler 11    colestipol (COLESTID) 1 g tablet Take 1 g by mouth 2 (two) times a day      cycloSPORINE (RESTASIS) 0 05 % ophthalmic emulsion Restasis 0 05 % eye drops in a dropperette      multivitamin (THERAGRAN) TABS Take 1 tablet by mouth daily      Omega-3 Fatty Acids (FISH OIL) 1,000 mg Take 1,000 mg by mouth daily      rivaroxaban (XARELTO) 20 mg tablet Take 20 mg by mouth daily       Na Sulfate-K Sulfate-Mg Sulf (Suprep Bowel Prep Kit) 17 5-3 13-1 6 GM/177ML SOLN Take 2 Bottles by mouth see administration instructions Please follow the instructions from the office 2 Bottle 0    penicillin V potassium (VEETID) 500 mg tablet penicillin V potassium 500 mg tablet       No current facility-administered medications for this visit  Blood pressure 126/78, pulse 82, temperature 98 2 °F (36 8 °C), temperature source Tympanic, height 5' 9" (1 753 m), weight 83 9 kg (185 lb)  PHYSICAL EXAM: Physical Exam   Constitutional: He is oriented to person, place, and time  He appears well-developed  HENT:   Head: Normocephalic and atraumatic  Mouth/Throat: Oropharynx is clear and moist    Eyes: Pupils are equal, round, and reactive to light  Conjunctivae are normal  Right eye exhibits no discharge  Left eye exhibits no discharge  No scleral icterus  Neck: Neck supple  No JVD present  No tracheal deviation present  No thyromegaly present  Cardiovascular: Normal rate, regular rhythm, normal heart sounds and intact distal pulses  Exam reveals no gallop and no friction rub  No murmur heard  Pulmonary/Chest: Effort normal and breath sounds normal  No respiratory distress  He has no wheezes  He has no rales  He exhibits no tenderness  Abdominal: Soft  Bowel sounds are normal  He exhibits no distension and no mass  There is no tenderness  There is no rebound and no guarding  No hernia  Musculoskeletal: He exhibits no edema  Lymphadenopathy:     He has no cervical adenopathy     Neurological: He is alert and oriented to person, place, and time  Skin: Skin is warm and dry  No rash noted  No erythema  Psychiatric: He has a normal mood and affect  His behavior is normal  Thought content normal         Lab Results   Component Value Date    WBC 6 2 06/19/2020    HGB 15 7 06/19/2020    HCT 46 6 06/19/2020    MCV 91 06/19/2020     06/19/2020     Lab Results   Component Value Date    CALCIUM 9 0 01/20/2020    K 4 3 01/20/2020    CO2 26 01/20/2020     01/20/2020    BUN 14 01/20/2020    CREATININE 1 19 01/20/2020     Lab Results   Component Value Date    ALT 29 11/18/2019    AST 18 11/18/2019    ALKPHOS 85 11/18/2019     Lab Results   Component Value Date    INR 0 92 11/18/2019    PROTIME 11 8 11/18/2019       No results found  ASSESSMENT & PLAN:    History of colon polyps  Personal history of colon polyps- patient is at increased risk for colon cancer screening  Rule out colorectal lesions including polyps or malignancy     -Schedule for colonoscopy  -High-fiber diet     -Patient was given instructions about the colonoscopy prep     -Patient was explained about  the risks and benefits of the procedure  Risks including but not limited to bleeding, infection, perforation were explained in detail  Also explained about less than 100% sensitivity with the exam and other alternatives  Anticoagulant long-term use  Patient is at increased risks because of anticoagulation therapy  We need to get a clearance from his hematologist about holding Xarelto prior to the procedures or if he need to use the bridging therapy with Lovenox    If it is too risky to hold the anticoagulation therapy we can consider diagnostic colonoscopy on Xarelto

## 2020-07-30 NOTE — ASSESSMENT & PLAN NOTE
Patient is at increased risks because of anticoagulation therapy  We need to get a clearance from his hematologist about holding Xarelto prior to the procedures or if he need to use the bridging therapy with Lovenox    If it is too risky to hold the anticoagulation therapy we can consider diagnostic colonoscopy on Xarelto

## 2020-07-31 ENCOUNTER — TELEPHONE (OUTPATIENT)
Dept: GASTROENTEROLOGY | Facility: AMBULARY SURGERY CENTER | Age: 65
End: 2020-07-31

## 2020-07-31 NOTE — TELEPHONE ENCOUNTER
Medication clearance faxed to dr Dyer Sessions for 2 day hold xarelto vs bridging therapy   Pt is scheduled w/ dr Junior Mckee for colonoscopy on 8/24/2020 at [facility]

## 2020-08-04 ENCOUNTER — TELEPHONE (OUTPATIENT)
Dept: GASTROENTEROLOGY | Facility: AMBULARY SURGERY CENTER | Age: 65
End: 2020-08-04

## 2020-08-04 NOTE — TELEPHONE ENCOUNTER
Pt aware OK to hold xarelto 1 day prior per dr Martin Og   Pt is scheduled for colonoscopy w/ dr Cole Hayward on 8/24/2020 at [facility]

## 2020-08-24 ENCOUNTER — ANESTHESIA EVENT (OUTPATIENT)
Dept: GASTROENTEROLOGY | Facility: HOSPITAL | Age: 65
End: 2020-08-24

## 2020-08-24 ENCOUNTER — HOSPITAL ENCOUNTER (OUTPATIENT)
Dept: GASTROENTEROLOGY | Facility: HOSPITAL | Age: 65
Setting detail: OUTPATIENT SURGERY
Discharge: HOME | End: 2020-08-24
Attending: INTERNAL MEDICINE
Payer: COMMERCIAL

## 2020-08-24 ENCOUNTER — ANESTHESIA (OUTPATIENT)
Dept: GASTROENTEROLOGY | Facility: HOSPITAL | Age: 65
End: 2020-08-24

## 2020-08-24 VITALS
SYSTOLIC BLOOD PRESSURE: 130 MMHG | OXYGEN SATURATION: 95 % | HEART RATE: 68 BPM | RESPIRATION RATE: 16 BRPM | WEIGHT: 178 LBS | HEIGHT: 69 IN | DIASTOLIC BLOOD PRESSURE: 78 MMHG | TEMPERATURE: 97.4 F | BODY MASS INDEX: 26.36 KG/M2

## 2020-08-24 DIAGNOSIS — Z12.11 SCREENING FOR COLON CANCER: ICD-10-CM

## 2020-08-24 DIAGNOSIS — Z86.010 HISTORY OF COLON POLYPS: ICD-10-CM

## 2020-08-24 DIAGNOSIS — Z79.01 ANTICOAGULANT LONG-TERM USE: ICD-10-CM

## 2020-08-24 PROCEDURE — 88305 TISSUE EXAM BY PATHOLOGIST: CPT | Performed by: PATHOLOGY

## 2020-08-24 PROCEDURE — 45385 COLONOSCOPY W/LESION REMOVAL: CPT | Performed by: INTERNAL MEDICINE

## 2020-08-24 RX ORDER — PROPOFOL 10 MG/ML
INJECTION, EMULSION INTRAVENOUS CONTINUOUS PRN
Status: DISCONTINUED | OUTPATIENT
Start: 2020-08-24 | End: 2020-08-24

## 2020-08-24 RX ORDER — PROPOFOL 10 MG/ML
INJECTION, EMULSION INTRAVENOUS AS NEEDED
Status: DISCONTINUED | OUTPATIENT
Start: 2020-08-24 | End: 2020-08-24

## 2020-08-24 RX ORDER — LIDOCAINE HYDROCHLORIDE 10 MG/ML
INJECTION, SOLUTION EPIDURAL; INFILTRATION; INTRACAUDAL; PERINEURAL AS NEEDED
Status: DISCONTINUED | OUTPATIENT
Start: 2020-08-24 | End: 2020-08-24

## 2020-08-24 RX ORDER — SODIUM CHLORIDE, SODIUM LACTATE, POTASSIUM CHLORIDE, CALCIUM CHLORIDE 600; 310; 30; 20 MG/100ML; MG/100ML; MG/100ML; MG/100ML
125 INJECTION, SOLUTION INTRAVENOUS CONTINUOUS
Status: DISCONTINUED | OUTPATIENT
Start: 2020-08-24 | End: 2020-08-28 | Stop reason: HOSPADM

## 2020-08-24 RX ADMIN — PROPOFOL 100 MG: 10 INJECTION, EMULSION INTRAVENOUS at 08:34

## 2020-08-24 RX ADMIN — PROPOFOL 100 MCG/KG/MIN: 10 INJECTION, EMULSION INTRAVENOUS at 08:34

## 2020-08-24 RX ADMIN — SODIUM CHLORIDE, SODIUM LACTATE, POTASSIUM CHLORIDE, AND CALCIUM CHLORIDE: .6; .31; .03; .02 INJECTION, SOLUTION INTRAVENOUS at 08:25

## 2020-08-24 RX ADMIN — LIDOCAINE HYDROCHLORIDE 50 MG: 10 INJECTION, SOLUTION EPIDURAL; INFILTRATION; INTRACAUDAL; PERINEURAL at 08:34

## 2020-08-24 NOTE — ANESTHESIA POSTPROCEDURE EVALUATION
Post-Op Assessment Note    CV Status:  Stable    Pain management: adequate     Mental Status:  Alert and awake   Hydration Status:  Euvolemic   PONV Controlled:  Controlled   Airway Patency:  Patent      Post Op Vitals Reviewed: Yes      Staff: CRNA   Comments: vss report rn        No complications documented      BP      Temp     Pulse     Resp      SpO2

## 2020-08-24 NOTE — INTERVAL H&P NOTE
H&P reviewed  After examining the patient I find no changes in the patients condition since the H&P had been written      Vitals:    08/24/20 0754   BP: 155/81   Pulse: 68   Resp: 18   Temp: (!) 97 °F (36 1 °C)   SpO2: 98%

## 2020-08-24 NOTE — ANESTHESIA PREPROCEDURE EVALUATION
Procedure:  COLONOSCOPY    Relevant Problems   CARDIO   (+) Acute deep vein thrombosis (DVT) of left lower extremity (HCC)   (+) Atypical chest pain   (+) Dyspnea on exertion   (+) Hyperlipidemia   (+) Hypertension      NEURO/PSYCH   (+) History of colon polyps      PULMONARY   (+) Dyspnea on exertion   (+) Obstructive sleep apnea         LEFT VENTRICLE:  Systolic function was normal  Ejection fraction was estimated to be 60 %  There were no regional wall motion abnormalities      RIGHT VENTRICLE:  The size was normal   Systolic function was normal      MITRAL VALVE:  There was mild regurgitation      AORTIC VALVE:  There was trace regurgitation      TRICUSPID VALVE:  There was mild regurgitation  Pulmonary artery systolic pressure was within the normal range      PULMONIC VALVE:  There was trace regurgitation  Physical Exam    Airway    Mallampati score: II  TM Distance: >3 FB  Neck ROM: full     Dental   No notable dental hx     Cardiovascular  Cardiovascular exam normal    Pulmonary  Pulmonary exam normal     Other Findings        Anesthesia Plan  ASA Score- 3     Anesthesia Type- IV sedation with anesthesia with ASA Monitors  Additional Monitors:   Airway Plan:           Plan Factors-Exercise tolerance (METS): >4 METS  Chart reviewed  EKG reviewed  Imaging results reviewed  Existing labs reviewed  Patient is not a current smoker  Obstructive sleep apnea risk education given perioperatively  Induction-     Postoperative Plan-     Informed Consent- Anesthetic plan and risks discussed with patient  I personally reviewed this patient with the CRNA  Discussed and agreed on the Anesthesia Plan with the CRNA  Yuniel Welsh

## 2020-08-28 ENCOUNTER — TELEPHONE (OUTPATIENT)
Dept: GASTROENTEROLOGY | Facility: AMBULARY SURGERY CENTER | Age: 65
End: 2020-08-28

## 2020-08-28 ENCOUNTER — TELEPHONE (OUTPATIENT)
Dept: GASTROENTEROLOGY | Facility: CLINIC | Age: 65
End: 2020-08-28

## 2020-08-28 NOTE — TELEPHONE ENCOUNTER
----- Message from Michell Leblanc MD sent at 8/27/2020  4:47 PM EDT -----  Colon polyp removed came back as precancerous tubular adenoma    Repeat colonoscopy in 5 years

## 2020-09-03 ENCOUNTER — TELEPHONE (OUTPATIENT)
Dept: HEMATOLOGY ONCOLOGY | Facility: CLINIC | Age: 65
End: 2020-09-03

## 2020-09-03 DIAGNOSIS — I82.4Z2 ACUTE DEEP VEIN THROMBOSIS (DVT) OF DISTAL VEIN OF LEFT LOWER EXTREMITY (HCC): Primary | ICD-10-CM

## 2020-09-03 NOTE — TELEPHONE ENCOUNTER
Medication Refill     Who is Calling  Patient   Medication  XARELTO       How many pills left  0   Preferred [facility]    Call back number  [phone]   Relevant Information

## 2020-09-03 NOTE — TELEPHONE ENCOUNTER
Patient called requesting refill for Xarleto  He has no more pills left  He was previously taking 20mg po daily but mentioned that you were thinking about reducing him to 10mg po daily  He does have a follow up scheduled next month  What dose would you like to be filled at this time?     Patient needs Rx sent to [facility] - 90 day supply

## 2020-09-14 ENCOUNTER — TELEPHONE (OUTPATIENT)
Dept: SURGERY | Facility: CLINIC | Age: 65
End: 2020-09-14

## 2020-09-14 NOTE — TELEPHONE ENCOUNTER
Patient called me back and left a message, I called back but had to leave another message for him to call back again

## 2020-09-16 ENCOUNTER — TELEPHONE (OUTPATIENT)
Dept: SURGERY | Facility: CLINIC | Age: 65
End: 2020-09-16

## 2020-09-17 ENCOUNTER — CONSULT (OUTPATIENT)
Dept: SURGERY | Facility: CLINIC | Age: 65
End: 2020-09-17
Payer: COMMERCIAL

## 2020-09-17 VITALS
DIASTOLIC BLOOD PRESSURE: 80 MMHG | HEART RATE: 71 BPM | HEIGHT: 69 IN | BODY MASS INDEX: 26.88 KG/M2 | WEIGHT: 181.5 LBS | SYSTOLIC BLOOD PRESSURE: 120 MMHG | TEMPERATURE: 99.2 F

## 2020-09-17 DIAGNOSIS — K64.4 EXTERNAL HEMORRHOID: Primary | ICD-10-CM

## 2020-09-17 PROCEDURE — 99242 OFF/OP CONSLTJ NEW/EST SF 20: CPT | Performed by: SURGERY

## 2020-09-17 NOTE — PROGRESS NOTES
Assessment/Plan:  I reviewed the report of the colonoscopy  I also reviewed his past history  He has history of PE and DVT 9 months ago for which he is on anticoagulation  At this time he is not taking his Xarelto  I recommended that he should talk to his hematologist regarding discontinuation  I advised him Sitz bath  He was also advised regarding stool softeners and local hygiene  I am going to see him in a week and if it that time if the external hemorrhoid becomes larger or is more symptomatic we will drain it in the operating room  No problem-specific Assessment & Plan notes found for this encounter  Diagnoses and all orders for this visit:    External hemorrhoid          Subjective:      Patient ID: Marcy Cortés is a 60 y o  male  53-year-old male patient came to my office with complaints of a small swelling in the perianal region  He says that it started when he had a colonoscopy  Patient does not complain of constipation or diarrhea  He says that he has had intermittent occasional bleeding per rectum in the past   He tells me that his colonoscopy was essentially normal   A quick review of the note from the gastroenterologist shows that he had less than 5 mm polyp but no internal hemorrhoids were reported  Patient is not in any distress  He does not complain of any anal or rectal pain  The following portions of the patient's history were reviewed and updated as appropriate:   He  has a past medical history of Asthma, Colon polyp, CPAP (continuous positive airway pressure) dependence, DVT (deep venous thrombosis) ([facility] [address] ), Hyperlipidemia, Kidney stone, Pulmonary emboli ([facility] [address] ), and Sleep apnea    He   Patient Active Problem List    Diagnosis Date Noted    Screening for colon cancer 07/30/2020    Anticoagulant long-term use 07/30/2020    History of colon polyps 07/30/2020    Obstructive sleep apnea 01/29/2020    Dyspnea on exertion 01/29/2020    Swelling of lower extremity 01/29/2020    Pulmonary emboli (HCC) 11/18/2019    Acute deep vein thrombosis (DVT) of left lower extremity ([facility] [address] ) 11/18/2019    Hypertension 11/18/2019    Hyperlipidemia 11/18/2019    Atypical chest pain 11/18/2019     He  has a past surgical history that includes Rotator cuff repair; Sinus surgery; and Colonoscopy  His family history is not on file  He  reports that he quit smoking about 20 years ago  His smoking use included cigarettes and cigars  He has a 15 00 pack-year smoking history  He has never used smokeless tobacco  He reports current alcohol use  He reports that he does not use drugs  Current Outpatient Medications   Medication Sig Dispense Refill    albuterol (VENTOLIN HFA) 90 mcg/act inhaler Inhale 2 puffs every 6 (six) hours as needed for wheezing 1 Inhaler 11    colestipol (COLESTID) 1 g tablet Take 1 g by mouth 2 (two) times a day      cycloSPORINE (RESTASIS) 0 05 % ophthalmic emulsion Restasis 0 05 % eye drops in a dropperette      multivitamin (THERAGRAN) TABS Take 1 tablet by mouth daily      Omega-3 Fatty Acids (FISH OIL) 1,000 mg Take 1,000 mg by mouth daily      rivaroxaban (Xarelto) 20 mg tablet Take 1 tablet (20 mg total) by mouth daily (Patient not taking: Reported on 9/17/2020) 30 tablet 0     No current facility-administered medications for this visit        Current Outpatient Medications on File Prior to Visit   Medication Sig    albuterol (VENTOLIN HFA) 90 mcg/act inhaler Inhale 2 puffs every 6 (six) hours as needed for wheezing    colestipol (COLESTID) 1 g tablet Take 1 g by mouth 2 (two) times a day    cycloSPORINE (RESTASIS) 0 05 % ophthalmic emulsion Restasis 0 05 % eye drops in a dropperette    multivitamin (THERAGRAN) TABS Take 1 tablet by mouth daily    Omega-3 Fatty Acids (FISH OIL) 1,000 mg Take 1,000 mg by mouth daily    rivaroxaban (Xarelto) 20 mg tablet Take 1 tablet (20 mg total) by mouth daily (Patient not taking: Reported on 9/17/2020)     No current facility-administered medications on file prior to visit  He has No Known Allergies       Review of Systems   Constitutional: Negative  HENT: Negative  Eyes: Negative  Respiratory: Negative  Cardiovascular: Negative  Gastrointestinal: Negative  Endocrine: Negative  Genitourinary: Negative  Musculoskeletal: Negative  Skin: Negative  Allergic/Immunologic: Negative  Neurological: Negative  Hematological: Negative  Psychiatric/Behavioral: Negative  Objective:      /80 (BP Location: Left arm, Patient Position: Sitting, Cuff Size: Large)   Pulse 71   Temp 99 2 °F (37 3 °C) (Tympanic)   Ht 5' 9" (1 753 m)   Wt 82 3 kg (181 lb 8 oz)   BMI 26 80 kg/m²          Physical Exam  Constitutional:       Appearance: Normal appearance  HENT:      Head: Normocephalic and atraumatic  Nose: Nose normal       Mouth/Throat:      Mouth: Mucous membranes are moist    Neck:      Musculoskeletal: Normal range of motion  Cardiovascular:      Rate and Rhythm: Normal rate and regular rhythm  Heart sounds: Normal heart sounds  Pulmonary:      Effort: Pulmonary effort is normal    Abdominal:      General: Abdomen is flat  Palpations: Abdomen is soft  Genitourinary:     Penis: Normal        Scrotum/Testes: Normal       Rectum: Normal       Comments: Per rectal exam was normal   I did not feel any internal hemorrhoids  There was multiple external hemorrhoids  One of them was hard and mildly tender  This 1 was at 9 o'clock position  Neurological:      Mental Status: He is alert

## 2020-09-24 ENCOUNTER — OFFICE VISIT (OUTPATIENT)
Dept: SURGERY | Facility: CLINIC | Age: 65
End: 2020-09-24
Payer: COMMERCIAL

## 2020-09-24 VITALS
HEIGHT: 69 IN | WEIGHT: 181 LBS | HEART RATE: 71 BPM | SYSTOLIC BLOOD PRESSURE: 134 MMHG | BODY MASS INDEX: 26.81 KG/M2 | DIASTOLIC BLOOD PRESSURE: 86 MMHG | TEMPERATURE: 98.2 F | RESPIRATION RATE: 18 BRPM

## 2020-09-24 DIAGNOSIS — K64.9 HEMORRHOIDS: Primary | ICD-10-CM

## 2020-09-24 PROCEDURE — 99213 OFFICE O/P EST LOW 20 MIN: CPT | Performed by: SURGERY

## 2020-09-24 NOTE — PROGRESS NOTES
Assessment/Plan:  I discussed with the patient that he would need exam under anesthesia with hemorrhoidectomy  I am scheduling him for the procedure at [facility] next week  I explained the procedure to him  He also was explained the need for Sitz bath  He was also told that he will have a packing which will have to remove the next day  He verbalized understanding  No problem-specific Assessment & Plan notes found for this encounter  Diagnoses and all orders for this visit:    Hemorrhoids          Subjective:      Patient ID: Lv Cummings is a 60 y o  male  62-year-old male patient came to my office for follow-up regarding his external hemorrhoid  It is still there but has less pain  No bleeding  Patient is not taking anticoagulation at this point  The following portions of the patient's history were reviewed and updated as appropriate: allergies, current medications, past medical history, past social history, past surgical history and problem list     Review of Systems   Constitutional: Negative  HENT: Negative  Eyes: Negative  Respiratory: Negative  Cardiovascular: Negative  Gastrointestinal: Positive for rectal pain  Endocrine: Negative  Genitourinary: Negative  Musculoskeletal: Negative  Skin: Negative  Allergic/Immunologic: Negative  Neurological: Negative  Hematological: Negative  Psychiatric/Behavioral: Negative  Objective:      /86 (BP Location: Right arm, Patient Position: Sitting, Cuff Size: Large)   Pulse 71   Temp 98 2 °F (36 8 °C) (Tympanic)   Resp 18   Ht 5' 9" (1 753 m)   Wt 82 1 kg (181 lb)   BMI 26 73 kg/m²          Physical Exam  Constitutional:       Appearance: Normal appearance  HENT:      Head: Normocephalic and atraumatic  Mouth/Throat:      Mouth: Mucous membranes are moist    Neck:      Musculoskeletal: Normal range of motion and neck supple     Cardiovascular:      Rate and Rhythm: Normal rate and regular rhythm  Pulses: Normal pulses  Heart sounds: Normal heart sounds  Pulmonary:      Effort: Pulmonary effort is normal       Breath sounds: Normal breath sounds  Abdominal:      General: Abdomen is flat  Palpations: Abdomen is soft  Genitourinary:     Penis: Normal        Rectum: Normal       Comments: Thrombosed external hemorrhoid at 9:00 a m  Position with sacral promontory at 12:00 p m  Position  Neurological:      Mental Status: He is alert

## 2020-09-24 NOTE — H&P
Assessment/Plan:  I discussed with the patient that he would need exam under anesthesia with hemorrhoidectomy  I am scheduling him for the procedure at [address] [place] [address] next week  I explained the procedure to him  He also was explained the need for Sitz bath  He was also told that he will have a packing which will have to remove the next day  He verbalized understanding  No problem-specific Assessment & Plan notes found for this encounter  Diagnoses and all orders for this visit:    Hemorrhoids          Subjective:      Patient ID: Pineda Samano is a 60 y o  male  81-year-old male patient came to my office for follow-up regarding his external hemorrhoid  It is still there but has less pain  No bleeding  Patient is not taking anticoagulation at this point  The following portions of the patient's history were reviewed and updated as appropriate: allergies, current medications, past medical history, past social history, past surgical history and problem list     Review of Systems   Constitutional: Negative  HENT: Negative  Eyes: Negative  Respiratory: Negative  Cardiovascular: Negative  Gastrointestinal: Positive for rectal pain  Endocrine: Negative  Genitourinary: Negative  Musculoskeletal: Negative  Skin: Negative  Allergic/Immunologic: Negative  Neurological: Negative  Hematological: Negative  Psychiatric/Behavioral: Negative  Objective:      /86 (BP Location: Right arm, Patient Position: Sitting, Cuff Size: Large)   Pulse 71   Temp 98 2 °F (36 8 °C) (Tympanic)   Resp 18   Ht 5' 9" (1 753 m)   Wt 82 1 kg (181 lb)   BMI 26 73 kg/m²          Physical Exam  Constitutional:       Appearance: Normal appearance  HENT:      Head: Normocephalic and atraumatic  Mouth/Throat:      Mouth: Mucous membranes are moist    Neck:      Musculoskeletal: Normal range of motion and neck supple     Cardiovascular:      Rate and Rhythm: Normal rate and regular rhythm  Pulses: Normal pulses  Heart sounds: Normal heart sounds  Pulmonary:      Effort: Pulmonary effort is normal       Breath sounds: Normal breath sounds  Abdominal:      General: Abdomen is flat  Palpations: Abdomen is soft  Genitourinary:     Penis: Normal        Rectum: Normal       Comments: Thrombosed external hemorrhoid at 9:00 a m  Position with sacral promontory at 12:00 p m  Position  Neurological:      Mental Status: He is alert

## 2020-09-25 DIAGNOSIS — Z01.818 PRE-OP TESTING: Primary | ICD-10-CM

## 2020-09-26 ENCOUNTER — OFFICE VISIT (OUTPATIENT)
Dept: LAB | Facility: CLINIC | Age: 65
End: 2020-09-26
Payer: COMMERCIAL

## 2020-09-26 DIAGNOSIS — Z01.818 PRE-OP TESTING: ICD-10-CM

## 2020-09-26 PROCEDURE — 93005 ELECTROCARDIOGRAM TRACING: CPT

## 2020-09-28 ENCOUNTER — ANESTHESIA EVENT (OUTPATIENT)
Dept: PERIOP | Facility: HOSPITAL | Age: 65
End: 2020-09-28
Payer: COMMERCIAL

## 2020-09-28 PROBLEM — K64.9 HEMORRHOIDS: Status: ACTIVE | Noted: 2020-09-28

## 2020-09-28 PROBLEM — K62.89 RECTAL PAIN: Status: ACTIVE | Noted: 2020-09-28

## 2020-09-28 PROBLEM — G62.9 NEUROPATHY: Status: ACTIVE | Noted: 2020-09-28

## 2020-09-28 LAB
ATRIAL RATE: 60 BPM
P AXIS: 25 DEGREES
PR INTERVAL: 206 MS
QRS AXIS: 33 DEGREES
QRSD INTERVAL: 96 MS
QT INTERVAL: 398 MS
QTC INTERVAL: 398 MS
T WAVE AXIS: 26 DEGREES
VENTRICULAR RATE: 60 BPM

## 2020-09-28 PROCEDURE — 93010 ELECTROCARDIOGRAM REPORT: CPT | Performed by: INTERNAL MEDICINE

## 2020-09-29 ENCOUNTER — HOSPITAL ENCOUNTER (OUTPATIENT)
Facility: HOSPITAL | Age: 65
Setting detail: OUTPATIENT SURGERY
Discharge: HOME | End: 2020-09-29
Attending: SURGERY | Admitting: SURGERY
Payer: COMMERCIAL

## 2020-09-29 ENCOUNTER — ANESTHESIA (OUTPATIENT)
Dept: PERIOP | Facility: HOSPITAL | Age: 65
End: 2020-09-29
Payer: COMMERCIAL

## 2020-09-29 VITALS
WEIGHT: 181 LBS | TEMPERATURE: 97.3 F | HEART RATE: 82 BPM | BODY MASS INDEX: 26.81 KG/M2 | HEIGHT: 69 IN | DIASTOLIC BLOOD PRESSURE: 84 MMHG | SYSTOLIC BLOOD PRESSURE: 149 MMHG | RESPIRATION RATE: 16 BRPM | OXYGEN SATURATION: 96 %

## 2020-09-29 VITALS — HEART RATE: 93 BPM

## 2020-09-29 DIAGNOSIS — K64.9 HEMORRHOIDS: ICD-10-CM

## 2020-09-29 PROCEDURE — 88304 TISSUE EXAM BY PATHOLOGIST: CPT | Performed by: PATHOLOGY

## 2020-09-29 PROCEDURE — 88341 IMHCHEM/IMCYTCHM EA ADD ANTB: CPT | Performed by: PATHOLOGY

## 2020-09-29 PROCEDURE — 88342 IMHCHEM/IMCYTCHM 1ST ANTB: CPT | Performed by: PATHOLOGY

## 2020-09-29 PROCEDURE — 46255 REMOVE INT/EXT HEM 1 GROUP: CPT | Performed by: SURGERY

## 2020-09-29 RX ORDER — HYDROMORPHONE HCL 110MG/55ML
PATIENT CONTROLLED ANALGESIA SYRINGE INTRAVENOUS AS NEEDED
Status: DISCONTINUED | OUTPATIENT
Start: 2020-09-29 | End: 2020-09-29

## 2020-09-29 RX ORDER — PROMETHAZINE HYDROCHLORIDE 25 MG/ML
25 INJECTION, SOLUTION INTRAMUSCULAR; INTRAVENOUS ONCE AS NEEDED
Status: DISCONTINUED | OUTPATIENT
Start: 2020-09-29 | End: 2020-09-29 | Stop reason: HOSPADM

## 2020-09-29 RX ORDER — HYDROMORPHONE HCL/PF 1 MG/ML
0.2 SYRINGE (ML) INJECTION
Status: DISCONTINUED | OUTPATIENT
Start: 2020-09-29 | End: 2020-09-29 | Stop reason: HOSPADM

## 2020-09-29 RX ORDER — DEXAMETHASONE SODIUM PHOSPHATE 10 MG/ML
INJECTION, SOLUTION INTRAMUSCULAR; INTRAVENOUS AS NEEDED
Status: DISCONTINUED | OUTPATIENT
Start: 2020-09-29 | End: 2020-09-29

## 2020-09-29 RX ORDER — PROPOFOL 10 MG/ML
INJECTION, EMULSION INTRAVENOUS AS NEEDED
Status: DISCONTINUED | OUTPATIENT
Start: 2020-09-29 | End: 2020-09-29

## 2020-09-29 RX ORDER — SODIUM CHLORIDE, SODIUM LACTATE, POTASSIUM CHLORIDE, CALCIUM CHLORIDE 600; 310; 30; 20 MG/100ML; MG/100ML; MG/100ML; MG/100ML
INJECTION, SOLUTION INTRAVENOUS CONTINUOUS PRN
Status: DISCONTINUED | OUTPATIENT
Start: 2020-09-29 | End: 2020-09-29

## 2020-09-29 RX ORDER — ONDANSETRON 2 MG/ML
4 INJECTION INTRAMUSCULAR; INTRAVENOUS ONCE AS NEEDED
Status: DISCONTINUED | OUTPATIENT
Start: 2020-09-29 | End: 2020-09-29 | Stop reason: HOSPADM

## 2020-09-29 RX ORDER — BUPIVACAINE HYDROCHLORIDE AND EPINEPHRINE 2.5; 5 MG/ML; UG/ML
INJECTION, SOLUTION EPIDURAL; INFILTRATION; INTRACAUDAL; PERINEURAL AS NEEDED
Status: DISCONTINUED | OUTPATIENT
Start: 2020-09-29 | End: 2020-09-29 | Stop reason: HOSPADM

## 2020-09-29 RX ORDER — FENTANYL CITRATE/PF 50 MCG/ML
25 SYRINGE (ML) INJECTION
Status: DISCONTINUED | OUTPATIENT
Start: 2020-09-29 | End: 2020-09-29 | Stop reason: HOSPADM

## 2020-09-29 RX ORDER — MIDAZOLAM HYDROCHLORIDE 2 MG/2ML
INJECTION, SOLUTION INTRAMUSCULAR; INTRAVENOUS AS NEEDED
Status: DISCONTINUED | OUTPATIENT
Start: 2020-09-29 | End: 2020-09-29

## 2020-09-29 RX ORDER — DOCUSATE SODIUM 100 MG/1
100 CAPSULE, LIQUID FILLED ORAL 2 TIMES DAILY
Qty: 10 CAPSULE | Refills: 0 | Status: SHIPPED | OUTPATIENT
Start: 2020-09-29 | End: 2020-11-03

## 2020-09-29 RX ORDER — SUCCINYLCHOLINE/SOD CL,ISO/PF 100 MG/5ML
SYRINGE (ML) INTRAVENOUS AS NEEDED
Status: DISCONTINUED | OUTPATIENT
Start: 2020-09-29 | End: 2020-09-29

## 2020-09-29 RX ORDER — OXYCODONE HYDROCHLORIDE AND ACETAMINOPHEN 5; 325 MG/1; MG/1
1 TABLET ORAL EVERY 4 HOURS PRN
Qty: 20 TABLET | Refills: 0 | Status: SHIPPED | OUTPATIENT
Start: 2020-09-29 | End: 2020-10-09

## 2020-09-29 RX ORDER — OXYCODONE HYDROCHLORIDE AND ACETAMINOPHEN 5; 325 MG/1; MG/1
1 TABLET ORAL EVERY 4 HOURS PRN
Status: DISCONTINUED | OUTPATIENT
Start: 2020-09-29 | End: 2020-09-29 | Stop reason: HOSPADM

## 2020-09-29 RX ORDER — FENTANYL CITRATE 50 UG/ML
INJECTION, SOLUTION INTRAMUSCULAR; INTRAVENOUS AS NEEDED
Status: DISCONTINUED | OUTPATIENT
Start: 2020-09-29 | End: 2020-09-29

## 2020-09-29 RX ORDER — SODIUM CHLORIDE, SODIUM LACTATE, POTASSIUM CHLORIDE, CALCIUM CHLORIDE 600; 310; 30; 20 MG/100ML; MG/100ML; MG/100ML; MG/100ML
75 INJECTION, SOLUTION INTRAVENOUS CONTINUOUS
Status: DISCONTINUED | OUTPATIENT
Start: 2020-09-29 | End: 2020-09-29 | Stop reason: HOSPADM

## 2020-09-29 RX ORDER — ONDANSETRON 2 MG/ML
INJECTION INTRAMUSCULAR; INTRAVENOUS AS NEEDED
Status: DISCONTINUED | OUTPATIENT
Start: 2020-09-29 | End: 2020-09-29

## 2020-09-29 RX ORDER — LIDOCAINE HYDROCHLORIDE 10 MG/ML
INJECTION, SOLUTION EPIDURAL; INFILTRATION; INTRACAUDAL; PERINEURAL AS NEEDED
Status: DISCONTINUED | OUTPATIENT
Start: 2020-09-29 | End: 2020-09-29

## 2020-09-29 RX ADMIN — DEXAMETHASONE SODIUM PHOSPHATE 4 MG: 10 INJECTION, SOLUTION INTRAMUSCULAR; INTRAVENOUS at 16:22

## 2020-09-29 RX ADMIN — OXYCODONE HYDROCHLORIDE AND ACETAMINOPHEN 1 TABLET: 5; 325 TABLET ORAL at 18:31

## 2020-09-29 RX ADMIN — FENTANYL CITRATE 100 MCG: 50 INJECTION INTRAMUSCULAR; INTRAVENOUS at 16:22

## 2020-09-29 RX ADMIN — METRONIDAZOLE 500 MG: 500 INJECTION, SOLUTION INTRAVENOUS at 16:26

## 2020-09-29 RX ADMIN — SODIUM CHLORIDE, SODIUM LACTATE, POTASSIUM CHLORIDE, AND CALCIUM CHLORIDE: .6; .31; .03; .02 INJECTION, SOLUTION INTRAVENOUS at 16:12

## 2020-09-29 RX ADMIN — HYDROMORPHONE HYDROCHLORIDE 0.5 MG: 2 INJECTION INTRAMUSCULAR; INTRAVENOUS; SUBCUTANEOUS at 16:43

## 2020-09-29 RX ADMIN — ONDANSETRON 4 MG: 2 INJECTION INTRAMUSCULAR; INTRAVENOUS at 16:55

## 2020-09-29 RX ADMIN — LIDOCAINE HYDROCHLORIDE 50 MG: 10 INJECTION, SOLUTION EPIDURAL; INFILTRATION; INTRACAUDAL; PERINEURAL at 16:18

## 2020-09-29 RX ADMIN — Medication 80 MG: at 16:22

## 2020-09-29 RX ADMIN — PROPOFOL 200 MG: 10 INJECTION, EMULSION INTRAVENOUS at 16:22

## 2020-09-29 RX ADMIN — MIDAZOLAM HYDROCHLORIDE 2 MG: 1 INJECTION, SOLUTION INTRAMUSCULAR; INTRAVENOUS at 16:19

## 2020-09-29 NOTE — ANESTHESIA POSTPROCEDURE EVALUATION
Post-Op Assessment Note    CV Status:  Stable  Pain Score: 0    Pain management: adequate     Mental Status:  Alert and awake   Hydration Status:  Euvolemic   PONV Controlled:  Controlled   Airway Patency:  Patent and adequate      Post Op Vitals Reviewed: Yes      Staff: CRNA         No complications documented      /82 (09/29/20 1713)    Temp 97 5 °F (36 4 °C) (09/29/20 1713)    Pulse 80 (09/29/20 1713)   Resp 12 (09/29/20 1713)    SpO2 99 % (09/29/20 1713)

## 2020-09-29 NOTE — ANESTHESIA PREPROCEDURE EVALUATION
Procedure:  HEMORRHOIDECTOMY EXCISION examination under anesthesia (N/A Anus)    Relevant Problems   CARDIO   (+) Acute deep vein thrombosis (DVT) of left lower extremity (HCC)   (+) Dyspnea on exertion   (+) Hemorrhoids   (+) Hyperlipidemia   (+) Hypertension      NEURO/PSYCH   (+) History of colon polyps      PULMONARY   (+) Dyspnea on exertion   (+) Obstructive sleep apnea      Other   (+) Neuropathy   (+) Overweight with body mass index (BMI) 25 0-29 9   (+) Rectal pain      Recent labs personally reviewed:  Lab Results   Component Value Date    WBC 6 2 06/19/2020    HGB 15 7 06/19/2020     06/19/2020     Lab Results   Component Value Date    K 4 3 01/20/2020    BUN 14 01/20/2020    CREATININE 1 19 01/20/2020     Lab Results   Component Value Date    PTT 73 (H) 11/19/2019      Lab Results   Component Value Date    INR 0 92 11/18/2019       No results found for: HGBA1C  Physical Exam    Airway    Mallampati score: II  TM Distance: >3 FB  Neck ROM: full     Dental       Cardiovascular  Rhythm: regular, Rate: normal, Cardiovascular exam normal    Pulmonary  Breath sounds clear to auscultation,     Other Findings        Anesthesia Plan  ASA Score- 3     Anesthesia Type- general with ASA Monitors  Additional Monitors:   Airway Plan: LMA  Comment: Татьяна Arroyo MD, have personally seen and evaluated the patient prior to anesthetic care  I have reviewed the pre-anesthetic record, and other medical records if appropriate to the anesthetic care  If a CRNA is involved in the case, I have reviewed the CRNA assessment, if present, and agree  All risks/benefits and alternatives were discussed with the patient including the possibility of aspiration, PONV, sore throat, as well as the possibility of other rare anesthetic and surgical emergencies   Risks involved with airway management, line placement, postoperative vision loss, neuropathy secondary to surgical positioning, as well as possible memory/awareness were discussed  Post-operative management was also discussed including the possibility of post-operative mechanical ventilation  Patient agreed and had no further questions prior to procedure          Plan Factors-Exercise tolerance (METS): >4 METS  Chart reviewed  Existing labs reviewed  Patient summary reviewed  Patient is not a current smoker  Induction- intravenous  Postoperative Plan- Plan for postoperative opioid use  Planned trial extubation    Informed Consent- Anesthetic plan and risks discussed with patient  I personally reviewed this patient with the CRNA  Discussed and agreed on the Anesthesia Plan with the CRNA  Karrie Nissen

## 2020-10-01 ENCOUNTER — TELEPHONE (OUTPATIENT)
Dept: GYNECOLOGIC ONCOLOGY | Facility: CLINIC | Age: 65
End: 2020-10-01

## 2020-10-02 ENCOUNTER — TELEPHONE (OUTPATIENT)
Dept: HEMATOLOGY ONCOLOGY | Facility: CLINIC | Age: 65
End: 2020-10-02

## 2020-10-03 ENCOUNTER — APPOINTMENT (EMERGENCY)
Dept: RADIOLOGY | Facility: HOSPITAL | Age: 65
End: 2020-10-03
Payer: COMMERCIAL

## 2020-10-03 ENCOUNTER — HOSPITAL ENCOUNTER (EMERGENCY)
Facility: HOSPITAL | Age: 65
Discharge: HOME | End: 2020-10-03
Attending: EMERGENCY MEDICINE | Admitting: EMERGENCY MEDICINE
Payer: COMMERCIAL

## 2020-10-03 ENCOUNTER — APPOINTMENT (EMERGENCY)
Dept: ULTRASOUND IMAGING | Facility: HOSPITAL | Age: 65
End: 2020-10-03
Payer: COMMERCIAL

## 2020-10-03 VITALS
WEIGHT: 180 LBS | BODY MASS INDEX: 26.66 KG/M2 | HEART RATE: 77 BPM | TEMPERATURE: 99 F | DIASTOLIC BLOOD PRESSURE: 93 MMHG | RESPIRATION RATE: 18 BRPM | SYSTOLIC BLOOD PRESSURE: 190 MMHG | HEIGHT: 69 IN | OXYGEN SATURATION: 99 %

## 2020-10-03 DIAGNOSIS — I82.409 DVT (DEEP VENOUS THROMBOSIS) (HCC): ICD-10-CM

## 2020-10-03 DIAGNOSIS — M54.31 SCIATICA OF RIGHT SIDE: Primary | ICD-10-CM

## 2020-10-03 LAB
ALBUMIN SERPL BCP-MCNC: 3.6 G/DL (ref 3.5–5)
ALP SERPL-CCNC: 78 U/L (ref 46–116)
ALT SERPL W P-5'-P-CCNC: 28 U/L (ref 12–78)
ANION GAP SERPL CALCULATED.3IONS-SCNC: 7 MMOL/L (ref 4–13)
APTT PPP: 33 SECONDS (ref 23–37)
AST SERPL W P-5'-P-CCNC: 19 U/L (ref 5–45)
BASOPHILS # BLD AUTO: 0.05 THOUSANDS/ΜL (ref 0–0.1)
BASOPHILS NFR BLD AUTO: 1 % (ref 0–1)
BILIRUB SERPL-MCNC: 0.3 MG/DL (ref 0.2–1)
BUN SERPL-MCNC: 18 MG/DL (ref 5–25)
CALCIUM SERPL-MCNC: 9.9 MG/DL (ref 8.3–10.1)
CHLORIDE SERPL-SCNC: 105 MMOL/L (ref 100–108)
CO2 SERPL-SCNC: 28 MMOL/L (ref 21–32)
CREAT SERPL-MCNC: 1.02 MG/DL (ref 0.6–1.3)
EOSINOPHIL # BLD AUTO: 0.22 THOUSAND/ΜL (ref 0–0.61)
EOSINOPHIL NFR BLD AUTO: 3 % (ref 0–6)
ERYTHROCYTE [DISTWIDTH] IN BLOOD BY AUTOMATED COUNT: 11.9 % (ref 11.6–15.1)
GFR SERPL CREATININE-BSD FRML MDRD: 78 ML/MIN/1.73SQ M
GLUCOSE SERPL-MCNC: 99 MG/DL (ref 65–140)
HCT VFR BLD AUTO: 46.5 % (ref 36.5–49.3)
HGB BLD-MCNC: 15.7 G/DL (ref 12–17)
IMM GRANULOCYTES # BLD AUTO: 0.05 THOUSAND/UL (ref 0–0.2)
IMM GRANULOCYTES NFR BLD AUTO: 1 % (ref 0–2)
INR PPP: 1.25 (ref 0.84–1.19)
LYMPHOCYTES # BLD AUTO: 2.01 THOUSANDS/ΜL (ref 0.6–4.47)
LYMPHOCYTES NFR BLD AUTO: 29 % (ref 14–44)
MCH RBC QN AUTO: 31 PG (ref 26.8–34.3)
MCHC RBC AUTO-ENTMCNC: 33.8 G/DL (ref 31.4–37.4)
MCV RBC AUTO: 92 FL (ref 82–98)
MONOCYTES # BLD AUTO: 0.74 THOUSAND/ΜL (ref 0.17–1.22)
MONOCYTES NFR BLD AUTO: 11 % (ref 4–12)
NEUTROPHILS # BLD AUTO: 3.84 THOUSANDS/ΜL (ref 1.85–7.62)
NEUTS SEG NFR BLD AUTO: 55 % (ref 43–75)
NRBC BLD AUTO-RTO: 0 /100 WBCS
PLATELET # BLD AUTO: 179 THOUSANDS/UL (ref 149–390)
PMV BLD AUTO: 10.8 FL (ref 8.9–12.7)
POTASSIUM SERPL-SCNC: 4.3 MMOL/L (ref 3.5–5.3)
PROT SERPL-MCNC: 7.2 G/DL (ref 6.4–8.2)
PROTHROMBIN TIME: 15.8 SECONDS (ref 11.6–14.5)
RBC # BLD AUTO: 5.06 MILLION/UL (ref 3.88–5.62)
SODIUM SERPL-SCNC: 140 MMOL/L (ref 136–145)
WBC # BLD AUTO: 6.91 THOUSAND/UL (ref 4.31–10.16)

## 2020-10-03 PROCEDURE — 93971 EXTREMITY STUDY: CPT

## 2020-10-03 PROCEDURE — 85610 PROTHROMBIN TIME: CPT | Performed by: EMERGENCY MEDICINE

## 2020-10-03 PROCEDURE — 80053 COMPREHEN METABOLIC PANEL: CPT | Performed by: EMERGENCY MEDICINE

## 2020-10-03 PROCEDURE — 36415 COLL VENOUS BLD VENIPUNCTURE: CPT | Performed by: EMERGENCY MEDICINE

## 2020-10-03 PROCEDURE — 99284 EMERGENCY DEPT VISIT MOD MDM: CPT | Performed by: EMERGENCY MEDICINE

## 2020-10-03 PROCEDURE — 96374 THER/PROPH/DIAG INJ IV PUSH: CPT

## 2020-10-03 PROCEDURE — 96375 TX/PRO/DX INJ NEW DRUG ADDON: CPT

## 2020-10-03 PROCEDURE — 72100 X-RAY EXAM L-S SPINE 2/3 VWS: CPT

## 2020-10-03 PROCEDURE — 99284 EMERGENCY DEPT VISIT MOD MDM: CPT

## 2020-10-03 PROCEDURE — 85025 COMPLETE CBC W/AUTO DIFF WBC: CPT | Performed by: EMERGENCY MEDICINE

## 2020-10-03 PROCEDURE — 85730 THROMBOPLASTIN TIME PARTIAL: CPT | Performed by: EMERGENCY MEDICINE

## 2020-10-03 RX ORDER — KETOROLAC TROMETHAMINE 30 MG/ML
15 INJECTION, SOLUTION INTRAMUSCULAR; INTRAVENOUS ONCE
Status: COMPLETED | OUTPATIENT
Start: 2020-10-03 | End: 2020-10-03

## 2020-10-03 RX ORDER — NAPROXEN 500 MG/1
500 TABLET ORAL 2 TIMES DAILY WITH MEALS
Qty: 30 TABLET | Refills: 0 | Status: SHIPPED | OUTPATIENT
Start: 2020-10-03 | End: 2020-10-05

## 2020-10-03 RX ORDER — DEXAMETHASONE SODIUM PHOSPHATE 4 MG/ML
10 INJECTION, SOLUTION INTRA-ARTICULAR; INTRALESIONAL; INTRAMUSCULAR; INTRAVENOUS; SOFT TISSUE ONCE
Status: COMPLETED | OUTPATIENT
Start: 2020-10-03 | End: 2020-10-03

## 2020-10-03 RX ORDER — CYCLOBENZAPRINE HCL 10 MG
10 TABLET ORAL 2 TIMES DAILY PRN
Qty: 20 TABLET | Refills: 0 | Status: SHIPPED | OUTPATIENT
Start: 2020-10-03 | End: 2020-11-03

## 2020-10-03 RX ADMIN — KETOROLAC TROMETHAMINE 15 MG: 30 INJECTION, SOLUTION INTRAMUSCULAR at 17:33

## 2020-10-03 RX ADMIN — DEXAMETHASONE SODIUM PHOSPHATE 10 MG: 4 INJECTION, SOLUTION INTRAMUSCULAR; INTRAVENOUS at 17:33

## 2020-10-04 PROCEDURE — 93971 EXTREMITY STUDY: CPT | Performed by: SURGERY

## 2020-10-05 ENCOUNTER — TELEPHONE (OUTPATIENT)
Dept: HEMATOLOGY ONCOLOGY | Facility: MEDICAL CENTER | Age: 65
End: 2020-10-05

## 2020-10-05 ENCOUNTER — OFFICE VISIT (OUTPATIENT)
Dept: HEMATOLOGY ONCOLOGY | Facility: CLINIC | Age: 65
End: 2020-10-05
Payer: COMMERCIAL

## 2020-10-05 ENCOUNTER — HOSPITAL ENCOUNTER (OUTPATIENT)
Dept: RADIOLOGY | Facility: HOSPITAL | Age: 65
Discharge: HOME | End: 2020-10-05
Payer: COMMERCIAL

## 2020-10-05 ENCOUNTER — DOCUMENTATION (OUTPATIENT)
Dept: HEMATOLOGY ONCOLOGY | Facility: CLINIC | Age: 65
End: 2020-10-05

## 2020-10-05 VITALS
SYSTOLIC BLOOD PRESSURE: 142 MMHG | OXYGEN SATURATION: 100 % | WEIGHT: 184.2 LBS | RESPIRATION RATE: 18 BRPM | HEIGHT: 69 IN | BODY MASS INDEX: 27.28 KG/M2 | HEART RATE: 59 BPM | TEMPERATURE: 97.7 F | DIASTOLIC BLOOD PRESSURE: 94 MMHG

## 2020-10-05 DIAGNOSIS — I82.4Z2 ACUTE DEEP VEIN THROMBOSIS (DVT) OF DISTAL VEIN OF LEFT LOWER EXTREMITY (HCC): Primary | ICD-10-CM

## 2020-10-05 DIAGNOSIS — I82.4Z1 ACUTE DEEP VEIN THROMBOSIS (DVT) OF DISTAL VEIN OF RIGHT LOWER EXTREMITY (HCC): ICD-10-CM

## 2020-10-05 DIAGNOSIS — I82.4Z2 ACUTE DEEP VEIN THROMBOSIS (DVT) OF DISTAL VEIN OF LEFT LOWER EXTREMITY (HCC): ICD-10-CM

## 2020-10-05 DIAGNOSIS — I26.99 PULMONARY EMBOLISM, OTHER, UNSPECIFIED CHRONICITY, UNSPECIFIED WHETHER ACUTE COR PULMONALE PRESENT (HCC): ICD-10-CM

## 2020-10-05 DIAGNOSIS — I26.99 PULMONARY EMBOLISM, OTHER, UNSPECIFIED CHRONICITY, UNSPECIFIED WHETHER ACUTE COR PULMONALE PRESENT (HCC): Primary | ICD-10-CM

## 2020-10-05 PROCEDURE — 71275 CT ANGIOGRAPHY CHEST: CPT

## 2020-10-05 PROCEDURE — 99215 OFFICE O/P EST HI 40 MIN: CPT | Performed by: PHYSICIAN ASSISTANT

## 2020-10-05 PROCEDURE — G1004 CDSM NDSC: HCPCS

## 2020-10-05 RX ADMIN — IOHEXOL 85 ML: 350 INJECTION, SOLUTION INTRAVENOUS at 17:18

## 2020-10-06 ENCOUNTER — TELEPHONE (OUTPATIENT)
Dept: HEMATOLOGY ONCOLOGY | Facility: CLINIC | Age: 65
End: 2020-10-06

## 2020-10-07 ENCOUNTER — TELEPHONE (OUTPATIENT)
Dept: HEMATOLOGY ONCOLOGY | Facility: CLINIC | Age: 65
End: 2020-10-07

## 2020-10-09 ENCOUNTER — TELEPHONE (OUTPATIENT)
Dept: HEMATOLOGY ONCOLOGY | Facility: CLINIC | Age: 65
End: 2020-10-09

## 2020-10-12 ENCOUNTER — HOSPITAL ENCOUNTER (OUTPATIENT)
Dept: VASCULAR ULTRASOUND | Facility: HOSPITAL | Age: 65
Discharge: HOME | End: 2020-10-12
Payer: COMMERCIAL

## 2020-10-12 DIAGNOSIS — I82.4Z1 ACUTE DEEP VEIN THROMBOSIS (DVT) OF DISTAL VEIN OF RIGHT LOWER EXTREMITY (HCC): ICD-10-CM

## 2020-10-12 DIAGNOSIS — I82.4Z2 ACUTE DEEP VEIN THROMBOSIS (DVT) OF DISTAL VEIN OF LEFT LOWER EXTREMITY (HCC): ICD-10-CM

## 2020-10-12 PROCEDURE — 93970 EXTREMITY STUDY: CPT | Performed by: SURGERY

## 2020-10-12 PROCEDURE — 93970 EXTREMITY STUDY: CPT

## 2020-10-19 ENCOUNTER — TELEPHONE (OUTPATIENT)
Dept: HEMATOLOGY ONCOLOGY | Facility: MEDICAL CENTER | Age: 65
End: 2020-10-19

## 2020-10-22 ENCOUNTER — OFFICE VISIT (OUTPATIENT)
Dept: SURGERY | Facility: CLINIC | Age: 65
End: 2020-10-22

## 2020-10-22 VITALS
HEART RATE: 70 BPM | RESPIRATION RATE: 18 BRPM | BODY MASS INDEX: 27.25 KG/M2 | TEMPERATURE: 98 F | WEIGHT: 184 LBS | HEIGHT: 69 IN | SYSTOLIC BLOOD PRESSURE: 124 MMHG | DIASTOLIC BLOOD PRESSURE: 86 MMHG

## 2020-10-22 DIAGNOSIS — K64.9 HEMORRHOID: Primary | ICD-10-CM

## 2020-10-22 PROCEDURE — 99024 POSTOP FOLLOW-UP VISIT: CPT | Performed by: SURGERY

## 2020-10-30 ENCOUNTER — TELEPHONE (OUTPATIENT)
Dept: HEMATOLOGY ONCOLOGY | Facility: CLINIC | Age: 65
End: 2020-10-30

## 2020-11-02 ENCOUNTER — OFFICE VISIT (OUTPATIENT)
Dept: HEMATOLOGY ONCOLOGY | Facility: CLINIC | Age: 65
End: 2020-11-02
Payer: COMMERCIAL

## 2020-11-02 VITALS
HEIGHT: 69 IN | OXYGEN SATURATION: 96 % | DIASTOLIC BLOOD PRESSURE: 82 MMHG | TEMPERATURE: 97.9 F | BODY MASS INDEX: 27.25 KG/M2 | WEIGHT: 184 LBS | HEART RATE: 65 BPM | SYSTOLIC BLOOD PRESSURE: 156 MMHG

## 2020-11-02 DIAGNOSIS — I82.593 CHRONIC DEEP VEIN THROMBOSIS (DVT) OF OTHER VEIN OF BOTH LOWER EXTREMITIES (HCC): Primary | ICD-10-CM

## 2020-11-02 PROCEDURE — 99214 OFFICE O/P EST MOD 30 MIN: CPT | Performed by: PHYSICIAN ASSISTANT

## 2020-11-03 ENCOUNTER — CONSULT (OUTPATIENT)
Dept: PAIN MEDICINE | Facility: CLINIC | Age: 65
End: 2020-11-03
Payer: COMMERCIAL

## 2020-11-03 ENCOUNTER — TRANSCRIBE ORDERS (OUTPATIENT)
Dept: PAIN MEDICINE | Facility: CLINIC | Age: 65
End: 2020-11-03

## 2020-11-03 VITALS
DIASTOLIC BLOOD PRESSURE: 88 MMHG | SYSTOLIC BLOOD PRESSURE: 150 MMHG | WEIGHT: 186 LBS | TEMPERATURE: 98.1 F | HEART RATE: 81 BPM | BODY MASS INDEX: 27.47 KG/M2

## 2020-11-03 DIAGNOSIS — G89.29 CHRONIC MIDLINE LOW BACK PAIN WITH SCIATICA, SCIATICA LATERALITY UNSPECIFIED: Primary | ICD-10-CM

## 2020-11-03 DIAGNOSIS — M70.61 GREATER TROCHANTERIC BURSITIS OF RIGHT HIP: ICD-10-CM

## 2020-11-03 DIAGNOSIS — M54.40 CHRONIC MIDLINE LOW BACK PAIN WITH SCIATICA, SCIATICA LATERALITY UNSPECIFIED: Primary | ICD-10-CM

## 2020-11-03 DIAGNOSIS — G57.03 PIRIFORMIS SYNDROME OF BOTH SIDES: ICD-10-CM

## 2020-11-03 PROCEDURE — 99204 OFFICE O/P NEW MOD 45 MIN: CPT | Performed by: PHYSICAL MEDICINE & REHABILITATION

## 2020-11-09 ENCOUNTER — EVALUATION (OUTPATIENT)
Dept: PHYSICAL THERAPY | Facility: CLINIC | Age: 65
End: 2020-11-09
Payer: COMMERCIAL

## 2020-11-09 DIAGNOSIS — G57.03 PIRIFORMIS SYNDROME OF BOTH SIDES: Primary | ICD-10-CM

## 2020-11-09 DIAGNOSIS — M54.40 CHRONIC MIDLINE LOW BACK PAIN WITH SCIATICA, SCIATICA LATERALITY UNSPECIFIED: ICD-10-CM

## 2020-11-09 DIAGNOSIS — M70.61 GREATER TROCHANTERIC BURSITIS OF RIGHT HIP: ICD-10-CM

## 2020-11-09 DIAGNOSIS — G89.29 CHRONIC MIDLINE LOW BACK PAIN WITH SCIATICA, SCIATICA LATERALITY UNSPECIFIED: ICD-10-CM

## 2020-11-09 PROCEDURE — 97162 PT EVAL MOD COMPLEX 30 MIN: CPT | Performed by: PHYSICAL THERAPIST

## 2020-11-09 PROCEDURE — 97110 THERAPEUTIC EXERCISES: CPT | Performed by: PHYSICAL THERAPIST

## 2020-11-09 PROCEDURE — 97140 MANUAL THERAPY 1/> REGIONS: CPT | Performed by: PHYSICAL THERAPIST

## 2020-11-10 ENCOUNTER — OFFICE VISIT (OUTPATIENT)
Dept: PHYSICAL THERAPY | Facility: CLINIC | Age: 65
End: 2020-11-10
Payer: COMMERCIAL

## 2020-11-10 DIAGNOSIS — G89.29 CHRONIC MIDLINE LOW BACK PAIN WITH SCIATICA, SCIATICA LATERALITY UNSPECIFIED: ICD-10-CM

## 2020-11-10 DIAGNOSIS — G57.03 PIRIFORMIS SYNDROME OF BOTH SIDES: Primary | ICD-10-CM

## 2020-11-10 DIAGNOSIS — M70.61 GREATER TROCHANTERIC BURSITIS OF RIGHT HIP: ICD-10-CM

## 2020-11-10 DIAGNOSIS — M54.40 CHRONIC MIDLINE LOW BACK PAIN WITH SCIATICA, SCIATICA LATERALITY UNSPECIFIED: ICD-10-CM

## 2020-11-10 PROCEDURE — 97110 THERAPEUTIC EXERCISES: CPT | Performed by: PHYSICAL THERAPIST

## 2020-11-10 PROCEDURE — 97140 MANUAL THERAPY 1/> REGIONS: CPT | Performed by: PHYSICAL THERAPIST

## 2020-11-10 PROCEDURE — 97112 NEUROMUSCULAR REEDUCATION: CPT | Performed by: PHYSICAL THERAPIST

## 2020-11-16 ENCOUNTER — OFFICE VISIT (OUTPATIENT)
Dept: PHYSICAL THERAPY | Facility: CLINIC | Age: 65
End: 2020-11-16
Payer: COMMERCIAL

## 2020-11-16 DIAGNOSIS — G89.29 CHRONIC MIDLINE LOW BACK PAIN WITH SCIATICA, SCIATICA LATERALITY UNSPECIFIED: ICD-10-CM

## 2020-11-16 DIAGNOSIS — M54.40 CHRONIC MIDLINE LOW BACK PAIN WITH SCIATICA, SCIATICA LATERALITY UNSPECIFIED: ICD-10-CM

## 2020-11-16 DIAGNOSIS — G57.03 PIRIFORMIS SYNDROME OF BOTH SIDES: Primary | ICD-10-CM

## 2020-11-16 DIAGNOSIS — M70.61 GREATER TROCHANTERIC BURSITIS OF RIGHT HIP: ICD-10-CM

## 2020-11-16 PROCEDURE — 97140 MANUAL THERAPY 1/> REGIONS: CPT | Performed by: PHYSICAL THERAPIST

## 2020-11-16 PROCEDURE — 97110 THERAPEUTIC EXERCISES: CPT | Performed by: PHYSICAL THERAPIST

## 2020-11-16 PROCEDURE — 97112 NEUROMUSCULAR REEDUCATION: CPT | Performed by: PHYSICAL THERAPIST

## 2020-11-19 ENCOUNTER — OFFICE VISIT (OUTPATIENT)
Dept: PHYSICAL THERAPY | Facility: CLINIC | Age: 65
End: 2020-11-19
Payer: COMMERCIAL

## 2020-11-19 DIAGNOSIS — G89.29 CHRONIC MIDLINE LOW BACK PAIN WITH SCIATICA, SCIATICA LATERALITY UNSPECIFIED: ICD-10-CM

## 2020-11-19 DIAGNOSIS — M70.61 GREATER TROCHANTERIC BURSITIS OF RIGHT HIP: ICD-10-CM

## 2020-11-19 DIAGNOSIS — G57.03 PIRIFORMIS SYNDROME OF BOTH SIDES: Primary | ICD-10-CM

## 2020-11-19 DIAGNOSIS — M54.40 CHRONIC MIDLINE LOW BACK PAIN WITH SCIATICA, SCIATICA LATERALITY UNSPECIFIED: ICD-10-CM

## 2020-11-19 PROCEDURE — 97110 THERAPEUTIC EXERCISES: CPT | Performed by: PHYSICAL THERAPIST

## 2020-11-19 PROCEDURE — 97140 MANUAL THERAPY 1/> REGIONS: CPT | Performed by: PHYSICAL THERAPIST

## 2020-11-19 PROCEDURE — 97112 NEUROMUSCULAR REEDUCATION: CPT | Performed by: PHYSICAL THERAPIST

## 2020-11-23 ENCOUNTER — TELEPHONE (OUTPATIENT)
Dept: SURGERY | Facility: CLINIC | Age: 65
End: 2020-11-23

## 2020-11-23 ENCOUNTER — OFFICE VISIT (OUTPATIENT)
Dept: PHYSICAL THERAPY | Facility: CLINIC | Age: 65
End: 2020-11-23
Payer: COMMERCIAL

## 2020-11-23 DIAGNOSIS — M70.61 GREATER TROCHANTERIC BURSITIS OF RIGHT HIP: ICD-10-CM

## 2020-11-23 DIAGNOSIS — M54.40 CHRONIC MIDLINE LOW BACK PAIN WITH SCIATICA, SCIATICA LATERALITY UNSPECIFIED: ICD-10-CM

## 2020-11-23 DIAGNOSIS — G57.03 PIRIFORMIS SYNDROME OF BOTH SIDES: Primary | ICD-10-CM

## 2020-11-23 DIAGNOSIS — G89.29 CHRONIC MIDLINE LOW BACK PAIN WITH SCIATICA, SCIATICA LATERALITY UNSPECIFIED: ICD-10-CM

## 2020-11-23 PROCEDURE — 97112 NEUROMUSCULAR REEDUCATION: CPT

## 2020-11-23 PROCEDURE — 97140 MANUAL THERAPY 1/> REGIONS: CPT

## 2020-11-23 PROCEDURE — 97110 THERAPEUTIC EXERCISES: CPT

## 2020-11-27 ENCOUNTER — OFFICE VISIT (OUTPATIENT)
Dept: PHYSICAL THERAPY | Facility: CLINIC | Age: 65
End: 2020-11-27
Payer: COMMERCIAL

## 2020-11-27 DIAGNOSIS — G89.29 CHRONIC MIDLINE LOW BACK PAIN WITH SCIATICA, SCIATICA LATERALITY UNSPECIFIED: ICD-10-CM

## 2020-11-27 DIAGNOSIS — G57.03 PIRIFORMIS SYNDROME OF BOTH SIDES: Primary | ICD-10-CM

## 2020-11-27 DIAGNOSIS — M54.40 CHRONIC MIDLINE LOW BACK PAIN WITH SCIATICA, SCIATICA LATERALITY UNSPECIFIED: ICD-10-CM

## 2020-11-27 DIAGNOSIS — M70.61 GREATER TROCHANTERIC BURSITIS OF RIGHT HIP: ICD-10-CM

## 2020-11-27 PROCEDURE — 97112 NEUROMUSCULAR REEDUCATION: CPT | Performed by: PHYSICAL THERAPIST

## 2020-11-27 PROCEDURE — 97110 THERAPEUTIC EXERCISES: CPT | Performed by: PHYSICAL THERAPIST

## 2020-11-30 ENCOUNTER — OFFICE VISIT (OUTPATIENT)
Dept: PHYSICAL THERAPY | Facility: CLINIC | Age: 65
End: 2020-11-30
Payer: COMMERCIAL

## 2020-11-30 DIAGNOSIS — M54.40 CHRONIC MIDLINE LOW BACK PAIN WITH SCIATICA, SCIATICA LATERALITY UNSPECIFIED: ICD-10-CM

## 2020-11-30 DIAGNOSIS — G57.03 PIRIFORMIS SYNDROME OF BOTH SIDES: Primary | ICD-10-CM

## 2020-11-30 DIAGNOSIS — M70.61 GREATER TROCHANTERIC BURSITIS OF RIGHT HIP: ICD-10-CM

## 2020-11-30 DIAGNOSIS — G89.29 CHRONIC MIDLINE LOW BACK PAIN WITH SCIATICA, SCIATICA LATERALITY UNSPECIFIED: ICD-10-CM

## 2020-11-30 PROCEDURE — 97140 MANUAL THERAPY 1/> REGIONS: CPT | Performed by: PHYSICAL THERAPIST

## 2020-11-30 PROCEDURE — 97110 THERAPEUTIC EXERCISES: CPT | Performed by: PHYSICAL THERAPIST

## 2020-11-30 PROCEDURE — 97112 NEUROMUSCULAR REEDUCATION: CPT | Performed by: PHYSICAL THERAPIST

## 2020-12-03 ENCOUNTER — EVALUATION (OUTPATIENT)
Dept: PHYSICAL THERAPY | Facility: CLINIC | Age: 65
End: 2020-12-03
Payer: COMMERCIAL

## 2020-12-03 DIAGNOSIS — M54.40 CHRONIC MIDLINE LOW BACK PAIN WITH SCIATICA, SCIATICA LATERALITY UNSPECIFIED: ICD-10-CM

## 2020-12-03 DIAGNOSIS — M70.61 GREATER TROCHANTERIC BURSITIS OF RIGHT HIP: ICD-10-CM

## 2020-12-03 DIAGNOSIS — G89.29 CHRONIC MIDLINE LOW BACK PAIN WITH SCIATICA, SCIATICA LATERALITY UNSPECIFIED: ICD-10-CM

## 2020-12-03 DIAGNOSIS — G57.03 PIRIFORMIS SYNDROME OF BOTH SIDES: Primary | ICD-10-CM

## 2020-12-03 PROCEDURE — 97110 THERAPEUTIC EXERCISES: CPT | Performed by: PHYSICAL THERAPIST

## 2020-12-03 PROCEDURE — 97112 NEUROMUSCULAR REEDUCATION: CPT | Performed by: PHYSICAL THERAPIST

## 2020-12-03 PROCEDURE — 97140 MANUAL THERAPY 1/> REGIONS: CPT | Performed by: PHYSICAL THERAPIST

## 2021-01-05 LAB
ALBUMIN SERPL-MCNC: 4 G/DL (ref 3.6–5.1)
ALBUMIN/GLOB SERPL: 1.5 (CALC) (ref 1–2.5)
ALP SERPL-CCNC: 64 U/L (ref 35–144)
ALT SERPL-CCNC: 24 U/L (ref 9–46)
AST SERPL-CCNC: 19 U/L (ref 10–35)
BASOPHILS # BLD AUTO: 52 CELLS/UL (ref 0–200)
BASOPHILS NFR BLD AUTO: 0.9 %
BILIRUB SERPL-MCNC: 0.5 MG/DL (ref 0.2–1.2)
BUN SERPL-MCNC: 21 MG/DL (ref 7–25)
BUN/CREAT SERPL: ABNORMAL (CALC) (ref 6–22)
CALCIUM SERPL-MCNC: 9.8 MG/DL (ref 8.6–10.3)
CHLORIDE SERPL-SCNC: 107 MMOL/L (ref 98–110)
CO2 SERPL-SCNC: 29 MMOL/L (ref 20–32)
CREAT SERPL-MCNC: 1.04 MG/DL (ref 0.7–1.25)
EOSINOPHIL # BLD AUTO: 81 CELLS/UL (ref 15–500)
EOSINOPHIL NFR BLD AUTO: 1.4 %
ERYTHROCYTE [DISTWIDTH] IN BLOOD BY AUTOMATED COUNT: 12.5 % (ref 11–15)
GLOBULIN SER CALC-MCNC: 2.6 G/DL (CALC) (ref 1.9–3.7)
GLUCOSE SERPL-MCNC: 100 MG/DL (ref 65–99)
HCT VFR BLD AUTO: 44.6 % (ref 38.5–50)
HGB BLD-MCNC: 15.2 G/DL (ref 13.2–17.1)
LYMPHOCYTES # BLD AUTO: 1595 CELLS/UL (ref 850–3900)
LYMPHOCYTES NFR BLD AUTO: 27.5 %
MCH RBC QN AUTO: 31 PG (ref 27–33)
MCHC RBC AUTO-ENTMCNC: 34.1 G/DL (ref 32–36)
MCV RBC AUTO: 91 FL (ref 80–100)
MONOCYTES # BLD AUTO: 429 CELLS/UL (ref 200–950)
MONOCYTES NFR BLD AUTO: 7.4 %
NEUTROPHILS # BLD AUTO: 3642 CELLS/UL (ref 1500–7800)
NEUTROPHILS NFR BLD AUTO: 62.8 %
PLATELET # BLD AUTO: 197 THOUSAND/UL (ref 140–400)
PMV BLD REES-ECKER: 11.9 FL (ref 7.5–12.5)
POTASSIUM SERPL-SCNC: 4 MMOL/L (ref 3.5–5.3)
PROT SERPL-MCNC: 6.6 G/DL (ref 6.1–8.1)
RBC # BLD AUTO: 4.9 MILLION/UL (ref 4.2–5.8)
SL AMB EGFR AFRICAN AMERICAN: 88 ML/MIN/1.73M2
SL AMB EGFR NON AFRICAN AMERICAN: 76 ML/MIN/1.73M2
SODIUM SERPL-SCNC: 142 MMOL/L (ref 135–146)
WBC # BLD AUTO: 5.8 THOUSAND/UL (ref 3.8–10.8)

## 2021-02-05 ENCOUNTER — TELEPHONE (OUTPATIENT)
Dept: HEMATOLOGY ONCOLOGY | Facility: CLINIC | Age: 66
End: 2021-02-05

## 2021-02-08 ENCOUNTER — OFFICE VISIT (OUTPATIENT)
Dept: HEMATOLOGY ONCOLOGY | Facility: CLINIC | Age: 66
End: 2021-02-08
Payer: COMMERCIAL

## 2021-02-08 VITALS
HEIGHT: 69 IN | RESPIRATION RATE: 12 BRPM | HEART RATE: 66 BPM | SYSTOLIC BLOOD PRESSURE: 122 MMHG | BODY MASS INDEX: 27.55 KG/M2 | TEMPERATURE: 98.2 F | DIASTOLIC BLOOD PRESSURE: 60 MMHG | WEIGHT: 186 LBS | OXYGEN SATURATION: 97 %

## 2021-02-08 DIAGNOSIS — Z86.718 HISTORY OF DVT (DEEP VEIN THROMBOSIS): ICD-10-CM

## 2021-02-08 DIAGNOSIS — Z79.01 ANTICOAGULANT LONG-TERM USE: ICD-10-CM

## 2021-02-08 DIAGNOSIS — D68.51 FACTOR V LEIDEN CARRIER (HCC): Primary | ICD-10-CM

## 2021-02-08 PROCEDURE — 1036F TOBACCO NON-USER: CPT | Performed by: PHYSICIAN ASSISTANT

## 2021-02-08 PROCEDURE — 3008F BODY MASS INDEX DOCD: CPT | Performed by: PHYSICIAN ASSISTANT

## 2021-02-08 PROCEDURE — 99213 OFFICE O/P EST LOW 20 MIN: CPT | Performed by: PHYSICIAN ASSISTANT

## 2021-02-08 NOTE — PROGRESS NOTES
Hematology/Oncology Outpatient Follow-up  Chula Copeland 63 y o  male 1955 [id number]    Date:  2/8/2021      Assessment and Plan:  1  History of DVT (deep vein thrombosis), 2  Factor V Leiden carrier ([address] ), 3  Anticoagulant long-term use    60-year-old male presents for follow-up regarding history of unprovoked DVT and PE  Hypercoag workup showed factor 5 Leiden heterozygous  Colonoscopy was performed which was negative  PSA was normal  Due to unprovoked nature and having factor 5 Leiden patient was recommended to remain on long-term anticoagulation, preventative dose  He is on Xarelto 10 mg p o  daily  He tolerates this well  He states that he just received a 90 day supply  I recommendation is for him to remain on this long-term unless contraindicated  It was reviewed with him today that he is not to be taking any NSAIDs  This was written on his after visit summary as well  He does have pain for which she refers to as inflammatory pain for which he is is speaking with his PCP about  He will ask PCP for other pain medication that he could take instead of NSAID  This note was sent to PCP  PCP will take over prescribing medication and we will see patient as needed  Patient is aware of this plan and is agreeable  His wife accompanied him today  HPI:  60-year-old male presents for follow-up regarding thrombosis      Past medical history significant for hypertension hyperlipidemia  Betty Hatfield was diagnosed with left lower extremity DVT nonocclusive and pulmonary emboli within the right lower lobe and lingular segment branches diagnosed in November 2019      1 week prior to presentation to hospital he had some pain in the lower extremities bilaterally, shortness of breath but then this for shortness of breath resolved  Betty Hatfield went to the emergency room in November 2019 due to continued chest pain shortness of breath, lower extremity pain   Ultrasound demonstrated an acute occlusive DVT of the peroneal veins      Patient was started on Xarelto 20 mg p o   Daily   He denied any provoking factors including travel, trauma, change in activity, family history      He returned back emergency department in January 2020 secondary to being on a long flight with associated chest pain with concern about recurrent pulmonary embolism   D-dimer was negative   Chest x-ray was completed and was also negative      Hypercoag work up:  Hypercoag workup showed heterozygous factor 5 Leiden, protein C activity normal, protein S activity acceptable, beta 2 glycoproteins negative, anticardiolipin antibodies negative, lupus anticoagulant positive     He had a repeat Doppler study in the emergency department in January 2020   This showed a subacute versus chronic DVT in this short segment of 1 of the paired peroneal veins   Right lower extremity was negative   D-dimer was also repeated in January 2020 and was normal      Patient repeated lupus anticoagulant testing on 06/01/2020 after coming off of Xarelto and this was negative      He has had colonoscopy 2-3 times        He had repeat colonoscopy on 08/24/2020   This showed a 5 mm polyp in the sigmoid colon which was removed   Repeat was recommended in 5 years      He then had a hemorrhoid removal surgery on 09/29/2020      Patient presented to the office for follow-up appointment on 10/02/2020 however he did not have his labs completed which was the point of the visit to provide recommendations of decreasing dose of Xarelto   Therefore this was rescheduled  Susy Randall then he presented to the emergency department on 10/03/2020  Tiara Benton states he presented due to leg swelling and numbness as well as leg pain all of the right leg  Susy Randall he also was having chest pain but did not disclose any of this to the emergency room providers  Tiara Benton stated that his leg pain was more bothersome and he did not want tell them about his chest pain      He then had a follow up visit with our office on 10/15/20 which at that time he stated chest pain had improved  but is still coming and going  Sophia Gallegos states it feels similar to that previously in 2019 for which he was diagnosed with PE  Therefore he had CTA of the chest   This was negative  June 2020 PSA 1 1    Interval history:    ROS: Review of Systems   Constitutional: Negative for appetite change, chills, fatigue, fever and unexpected weight change  HENT: Negative for trouble swallowing  Respiratory: Positive for shortness of breath (mild sometimes on exertion )  Negative for cough  Cardiovascular: Negative for chest pain, palpitations and leg swelling  Gastrointestinal: Negative for abdominal pain, blood in stool, constipation, diarrhea, nausea and vomiting  Denies dark or black stool    Genitourinary: Negative for difficulty urinating, dysuria and hematuria  Musculoskeletal: Positive for arthralgias, back pain and myalgias  Feels that he is having a lot of inflammation     Skin: Negative  Neurological: Positive for dizziness (when in pain), light-headedness (when in pain) and headaches (when in pain)  Negative for weakness and numbness  Hematological: Negative  Psychiatric/Behavioral: Negative  Past Medical History:   Diagnosis Date    Asthma     Colon polyp     CPAP (continuous positive airway pressure) dependence     DVT (deep venous thrombosis) (HCC)     LLE    Hyperlipidemia     Hypertension     Kidney stone     Pulmonary emboli (HCC)     Sleep apnea        Past Surgical History:   Procedure Laterality Date    COLONOSCOPY      HEMORRHOID SURGERY N/A 9/29/2020    Procedure: HEMORRHOIDECTOMY EXCISION examination under anesthesia;   Surgeon: Karen Li MD;  Location: [facility];  Service: General    ROTATOR CUFF REPAIR      SINUS SURGERY         Social History     Socioeconomic History    Marital status: /Civil Union     Spouse name: None    Number of children: None    Years of education: None    Highest education level: None   Occupational History    None   Social Needs    Financial resource strain: None    Food insecurity     Worry: None     Inability: None    Transportation needs     Medical: None     Non-medical: None   Tobacco Use    Smoking status: Former Smoker     Packs/day: 1 00     Years: 15 00     Pack years: 15 00     Types: Cigarettes, Cigars     Quit date:      Years since quittin 1    Smokeless tobacco: Never Used   Substance and Sexual Activity    Alcohol use: Yes     Frequency: Monthly or less     Drinks per session: 1 or 2     Comment: rare    Drug use: Never    Sexual activity: None   Lifestyle    Physical activity     Days per week: None     Minutes per session: None    Stress: None   Relationships    Social connections     Talks on phone: None     Gets together: None     Attends Hindu service: None     Active member of club or organization: None     Attends meetings of clubs or organizations: None     Relationship status: None    Intimate partner violence     Fear of current or ex partner: None     Emotionally abused: None     Physically abused: None     Forced sexual activity: None   Other Topics Concern    None   Social History Narrative    None       Family History   Problem Relation Age of Onset    No Known Problems Mother     Colon cancer Father 68       No Known Allergies      Current Outpatient Medications:     albuterol (VENTOLIN HFA) 90 mcg/act inhaler, Inhale 2 puffs every 6 (six) hours as needed for wheezing, Disp: 1 Inhaler, Rfl: 11    Ascorbic Acid (VITAMIN C PO), Take by mouth, Disp: , Rfl:     CALCIUM ASCORBATE PO, Take by mouth, Disp: , Rfl:     Calcium Polycarbophil (FIBER-CAPS PO), Take by mouth, Disp: , Rfl:     colestipol (COLESTID) 1 g tablet, Take 1 g by mouth 2 (two) times a day, Disp: , Rfl:     cycloSPORINE (RESTASIS) 0 05 % ophthalmic emulsion, Restasis 0 05 % eye drops in a dropperette, Disp: , Rfl:     multivitamin (THERAGRAN) TABS, Take 1 tablet by mouth daily, Disp: , Rfl:     Omega-3 Fatty Acids (FISH OIL) 1,000 mg, Take 1,000 mg by mouth daily, Disp: , Rfl:     Probiotic Product (PROBIOTIC DAILY PO), Take by mouth, Disp: , Rfl:     rivaroxaban (XARELTO) 10 mg tablet, Take 1 tablet (10 mg total) by mouth daily, Disp: 90 tablet, Rfl: 1    Turmeric (QC TUMERIC COMPLEX PO), Take by mouth, Disp: , Rfl:     VITAMIN D PO, Take by mouth, Disp: , Rfl:       Physical Exam:  /60 (BP Location: Left arm, Patient Position: Sitting, Cuff Size: Adult)   Pulse 66   Temp 98 2 °F (36 8 °C) (Temporal)   Resp 12   Ht 5' 9" (1 753 m)   Wt 84 4 kg (186 lb)   SpO2 97%   BMI 27 47 kg/m²     Physical Exam  Vitals signs reviewed  Constitutional:       General: He is not in acute distress  Appearance: He is well-developed  He is not ill-appearing  HENT:      Head: Normocephalic and atraumatic  Eyes:      General: No scleral icterus  Conjunctiva/sclera: Conjunctivae normal    Neck:      Musculoskeletal: Normal range of motion and neck supple  Cardiovascular:      Rate and Rhythm: Normal rate and regular rhythm  Heart sounds: Normal heart sounds  No murmur  Pulmonary:      Effort: Pulmonary effort is normal  No respiratory distress  Breath sounds: Normal breath sounds  Abdominal:      Palpations: Abdomen is soft  Tenderness: There is no abdominal tenderness  Musculoskeletal: Normal range of motion  General: No tenderness  Right lower leg: No edema  Left lower leg: No edema  Lymphadenopathy:      Cervical: No cervical adenopathy  Upper Body:      Right upper body: No supraclavicular or axillary adenopathy  Left upper body: No supraclavicular or axillary adenopathy  Skin:     General: Skin is warm and dry  Neurological:      Mental Status: He is alert and oriented to person, place, and time  Cranial Nerves: No cranial nerve deficit     Psychiatric:         Mood and Affect: Mood normal          Behavior: Behavior normal        Labs:  Lab Results   Component Value Date    WBC 5 8 01/04/2021    HGB 15 2 01/04/2021    HCT 44 6 01/04/2021    MCV 91 0 01/04/2021     01/04/2021     Lab Results   Component Value Date    K 4 0 01/04/2021     01/04/2021    CO2 29 01/04/2021    BUN 21 01/04/2021    CREATININE 1 04 01/04/2021    CALCIUM 9 8 01/04/2021    AST 19 01/04/2021    ALT 24 01/04/2021    ALKPHOS 64 01/04/2021    EGFR 78 10/03/2020       Patient voiced understanding and agreement in the above discussion  Aware to contact our office with questions/symptoms in the interim  This note has been generated by voice recognition software system  Therefore, there may be spelling, grammar, and or syntax errors  Please contact if questions arise

## 2021-02-09 ENCOUNTER — TELEPHONE (OUTPATIENT)
Dept: HEMATOLOGY ONCOLOGY | Facility: CLINIC | Age: 66
End: 2021-02-09

## 2021-02-09 NOTE — TELEPHONE ENCOUNTER
Received call from patient's wife Marlen Scott who states patient is at work and is bleeding from rectum  Attempted to call patient  Left voicemail message for patient to call [facility] or seek emergency care with prolonged bleeding

## 2021-03-27 DIAGNOSIS — I82.593 CHRONIC DEEP VEIN THROMBOSIS (DVT) OF OTHER VEIN OF BOTH LOWER EXTREMITIES (HCC): ICD-10-CM

## 2021-03-28 ENCOUNTER — IMMUNIZATIONS (OUTPATIENT)
Dept: FAMILY MEDICINE CLINIC | Facility: HOSPITAL | Age: 66
End: 2021-03-28

## 2021-03-28 DIAGNOSIS — Z23 ENCOUNTER FOR IMMUNIZATION: Primary | ICD-10-CM

## 2021-03-28 PROCEDURE — 91300 SARS-COV-2 / COVID-19 MRNA VACCINE (PFIZER-BIONTECH) 30 MCG: CPT

## 2021-03-28 PROCEDURE — 0001A SARS-COV-2 / COVID-19 MRNA VACCINE (PFIZER-BIONTECH) 30 MCG: CPT

## 2021-03-29 ENCOUNTER — TELEPHONE (OUTPATIENT)
Dept: HEMATOLOGY ONCOLOGY | Facility: CLINIC | Age: 66
End: 2021-03-29

## 2021-03-29 RX ORDER — RIVAROXABAN 10 MG/1
TABLET, FILM COATED ORAL
Qty: 90 TABLET | Refills: 1 | OUTPATIENT
Start: 2021-03-29

## 2021-03-29 NOTE — TELEPHONE ENCOUNTER
Please call patient and let him know, as discussed, his PCP needs to refill Xarelto now since he is no longer following with our office  Thanks!

## 2021-03-29 NOTE — TELEPHONE ENCOUNTER
LVM informing patient that Per KIANA Ríos, as discussed, he will need to contact his PCP for a refill of Xarelto 10 mg  I instructed patient that since he is no longer following with our office that we will not be able to refill his medication  Patient was instructed to call our office if he has any questions

## 2021-04-18 ENCOUNTER — IMMUNIZATIONS (OUTPATIENT)
Dept: FAMILY MEDICINE CLINIC | Facility: HOSPITAL | Age: 66
End: 2021-04-18

## 2021-04-18 DIAGNOSIS — Z23 ENCOUNTER FOR IMMUNIZATION: Primary | ICD-10-CM

## 2021-04-18 PROCEDURE — 91300 SARS-COV-2 / COVID-19 MRNA VACCINE (PFIZER-BIONTECH) 30 MCG: CPT

## 2021-04-18 PROCEDURE — 0002A SARS-COV-2 / COVID-19 MRNA VACCINE (PFIZER-BIONTECH) 30 MCG: CPT

## 2021-04-19 ENCOUNTER — APPOINTMENT (EMERGENCY)
Dept: CT IMAGING | Facility: HOSPITAL | Age: 66
End: 2021-04-19
Payer: COMMERCIAL

## 2021-04-19 ENCOUNTER — HOSPITAL ENCOUNTER (EMERGENCY)
Facility: HOSPITAL | Age: 66
Discharge: HOME | End: 2021-04-19
Attending: EMERGENCY MEDICINE | Admitting: EMERGENCY MEDICINE
Payer: COMMERCIAL

## 2021-04-19 VITALS
DIASTOLIC BLOOD PRESSURE: 78 MMHG | OXYGEN SATURATION: 97 % | RESPIRATION RATE: 18 BRPM | TEMPERATURE: 100 F | SYSTOLIC BLOOD PRESSURE: 143 MMHG | HEART RATE: 74 BPM

## 2021-04-19 DIAGNOSIS — R07.9 CHEST PAIN: Primary | ICD-10-CM

## 2021-04-19 LAB
ALBUMIN SERPL BCP-MCNC: 3.7 G/DL (ref 3.5–5)
ALP SERPL-CCNC: 74 U/L (ref 46–116)
ALT SERPL W P-5'-P-CCNC: 33 U/L (ref 12–78)
ANION GAP SERPL CALCULATED.3IONS-SCNC: 9 MMOL/L (ref 4–13)
APTT PPP: 31 SECONDS (ref 23–37)
AST SERPL W P-5'-P-CCNC: 18 U/L (ref 5–45)
BASOPHILS # BLD AUTO: 0.05 THOUSANDS/ΜL (ref 0–0.1)
BASOPHILS NFR BLD AUTO: 1 % (ref 0–1)
BILIRUB SERPL-MCNC: 0.47 MG/DL (ref 0.2–1)
BUN SERPL-MCNC: 18 MG/DL (ref 5–25)
CALCIUM SERPL-MCNC: 9 MG/DL (ref 8.3–10.1)
CHLORIDE SERPL-SCNC: 101 MMOL/L (ref 100–108)
CO2 SERPL-SCNC: 28 MMOL/L (ref 21–32)
CREAT SERPL-MCNC: 1.17 MG/DL (ref 0.6–1.3)
EOSINOPHIL # BLD AUTO: 0.03 THOUSAND/ΜL (ref 0–0.61)
EOSINOPHIL NFR BLD AUTO: 1 % (ref 0–6)
ERYTHROCYTE [DISTWIDTH] IN BLOOD BY AUTOMATED COUNT: 12.4 % (ref 11.6–15.1)
GFR SERPL CREATININE-BSD FRML MDRD: 66 ML/MIN/1.73SQ M
GLUCOSE SERPL-MCNC: 107 MG/DL (ref 65–140)
HCT VFR BLD AUTO: 47.4 % (ref 36.5–49.3)
HGB BLD-MCNC: 15.9 G/DL (ref 12–17)
IMM GRANULOCYTES # BLD AUTO: 0.03 THOUSAND/UL (ref 0–0.2)
IMM GRANULOCYTES NFR BLD AUTO: 1 % (ref 0–2)
INR PPP: 1.05 (ref 0.84–1.19)
LYMPHOCYTES # BLD AUTO: 0.52 THOUSANDS/ΜL (ref 0.6–4.47)
LYMPHOCYTES NFR BLD AUTO: 10 % (ref 14–44)
MCH RBC QN AUTO: 30.5 PG (ref 26.8–34.3)
MCHC RBC AUTO-ENTMCNC: 33.5 G/DL (ref 31.4–37.4)
MCV RBC AUTO: 91 FL (ref 82–98)
MONOCYTES # BLD AUTO: 0.56 THOUSAND/ΜL (ref 0.17–1.22)
MONOCYTES NFR BLD AUTO: 11 % (ref 4–12)
NEUTROPHILS # BLD AUTO: 4.07 THOUSANDS/ΜL (ref 1.85–7.62)
NEUTS SEG NFR BLD AUTO: 76 % (ref 43–75)
NRBC BLD AUTO-RTO: 0 /100 WBCS
PLATELET # BLD AUTO: 157 THOUSANDS/UL (ref 149–390)
PMV BLD AUTO: 10.3 FL (ref 8.9–12.7)
POTASSIUM SERPL-SCNC: 3.8 MMOL/L (ref 3.5–5.3)
PROT SERPL-MCNC: 7.6 G/DL (ref 6.4–8.2)
PROTHROMBIN TIME: 13.8 SECONDS (ref 11.6–14.5)
RBC # BLD AUTO: 5.21 MILLION/UL (ref 3.88–5.62)
SODIUM SERPL-SCNC: 138 MMOL/L (ref 136–145)
TROPONIN I SERPL-MCNC: <0.02 NG/ML
TROPONIN I SERPL-MCNC: <0.02 NG/ML
WBC # BLD AUTO: 5.26 THOUSAND/UL (ref 4.31–10.16)

## 2021-04-19 PROCEDURE — 96374 THER/PROPH/DIAG INJ IV PUSH: CPT

## 2021-04-19 PROCEDURE — 99285 EMERGENCY DEPT VISIT HI MDM: CPT | Performed by: EMERGENCY MEDICINE

## 2021-04-19 PROCEDURE — 93005 ELECTROCARDIOGRAM TRACING: CPT

## 2021-04-19 PROCEDURE — 85610 PROTHROMBIN TIME: CPT | Performed by: EMERGENCY MEDICINE

## 2021-04-19 PROCEDURE — 71275 CT ANGIOGRAPHY CHEST: CPT

## 2021-04-19 PROCEDURE — 85025 COMPLETE CBC W/AUTO DIFF WBC: CPT | Performed by: EMERGENCY MEDICINE

## 2021-04-19 PROCEDURE — 84484 ASSAY OF TROPONIN QUANT: CPT | Performed by: EMERGENCY MEDICINE

## 2021-04-19 PROCEDURE — 80053 COMPREHEN METABOLIC PANEL: CPT | Performed by: EMERGENCY MEDICINE

## 2021-04-19 PROCEDURE — 99285 EMERGENCY DEPT VISIT HI MDM: CPT

## 2021-04-19 PROCEDURE — 36415 COLL VENOUS BLD VENIPUNCTURE: CPT

## 2021-04-19 PROCEDURE — 74177 CT ABD & PELVIS W/CONTRAST: CPT

## 2021-04-19 PROCEDURE — 85730 THROMBOPLASTIN TIME PARTIAL: CPT | Performed by: EMERGENCY MEDICINE

## 2021-04-19 RX ORDER — METHOCARBAMOL 500 MG/1
500 TABLET, FILM COATED ORAL ONCE
Status: COMPLETED | OUTPATIENT
Start: 2021-04-19 | End: 2021-04-19

## 2021-04-19 RX ORDER — MORPHINE SULFATE 4 MG/ML
4 INJECTION, SOLUTION INTRAMUSCULAR; INTRAVENOUS ONCE
Status: COMPLETED | OUTPATIENT
Start: 2021-04-19 | End: 2021-04-19

## 2021-04-19 RX ORDER — METHOCARBAMOL 500 MG/1
500 TABLET, FILM COATED ORAL 2 TIMES DAILY
Qty: 20 TABLET | Refills: 0 | Status: SHIPPED | OUTPATIENT
Start: 2021-04-19

## 2021-04-19 RX ADMIN — MORPHINE SULFATE 4 MG: 4 INJECTION INTRAVENOUS at 18:28

## 2021-04-19 RX ADMIN — METHOCARBAMOL 500 MG: 500 TABLET ORAL at 20:40

## 2021-04-19 RX ADMIN — IOHEXOL 100 ML: 350 INJECTION, SOLUTION INTRAVENOUS at 18:37

## 2021-04-19 NOTE — Clinical Note
Sarah Smith was seen and treated in our emergency department on 4/19/2021  Diagnosis:     Tiff García  may return to work on return date  He may return on this date: 04/21/2021         If you have any questions or concerns, please don't hesitate to call        Yina Mendoza DO    ______________________________           _______________          _______________  Hospital Representative                              Date                                Time

## 2021-04-19 NOTE — Clinical Note
Vin Hernandez was seen and treated in our emergency department on 4/19/2021  Diagnosis:     Kristin Gary  may return to work on return date  He may return on this date: 04/21/2021         If you have any questions or concerns, please don't hesitate to call        Margoth Lovett DO    ______________________________           _______________          _______________  Hospital Representative                              Date                                Time

## 2021-04-19 NOTE — Clinical Note
Arthur Driver was seen and treated in our emergency department on 4/19/2021  Diagnosis:     [facility] MANI  may return to work on return date  He may return on this date: 04/21/2021         If you have any questions or concerns, please don't hesitate to call        Aj Braden DO    ______________________________           _______________          _______________  Hospital Representative                              Date                                Time

## 2021-04-19 NOTE — Clinical Note
Vin Hernandez was seen and treated in our emergency department on 4/19/2021  Diagnosis:     Kristin Gary  may return to school on return date  He may return on this date: 04/21/2021         If you have any questions or concerns, please don't hesitate to call        Margoth Lovett,     ______________________________           _______________          _______________  Hospital Representative                              Date                                Time

## 2021-04-19 NOTE — Clinical Note
Bala Ashli was seen and treated in our emergency department on 4/19/2021  Diagnosis:     Wendy Hernandez  may return to work on return date  He may return on this date: 04/21/2021         If you have any questions or concerns, please don't hesitate to call        Rodrigue Castellanos DO    ______________________________           _______________          _______________  Hospital Representative                              Date                                Time

## 2021-04-19 NOTE — Clinical Note
Adeline Martinez was seen and treated in our emergency department on 4/19/2021  Diagnosis:     Abdelrahman Donis  may return to work on return date  He may return on this date: 04/21/2021         If you have any questions or concerns, please don't hesitate to call        Sarah Mcgraw, DO    ______________________________           _______________          _______________  Hospital Representative                              Date                                Time

## 2021-04-19 NOTE — ED PROVIDER NOTES
History  Chief Complaint   Patient presents with    Chest Pain     Patient reports CP, neck pain and headache after COVID shot yesterday  hx of PE DVT       History provided by:  Patient  Chest Pain  Pain location:  Substernal area, epigastric, L chest and R chest  Pain quality: aching    Pain radiates to:  Does not radiate  Pain severity:  Moderate  Onset quality:  Gradual  Timing:  Constant  Progression:  Worsening  Chronicity:  New  Relieved by:  Nothing  Worsened by:  Nothing tried  Ineffective treatments:  None tried  Associated symptoms: back pain    Associated symptoms: no abdominal pain, no cough, no dizziness, no dysphagia, no fever, no headache, no nausea, no numbness, no shortness of breath and no weakness        Prior to Admission Medications   Prescriptions Last Dose Informant Patient Reported? Taking?    Ascorbic Acid (VITAMIN C PO)  Self Yes No   Sig: Take by mouth   CALCIUM ASCORBATE PO  Self Yes No   Sig: Take by mouth   Calcium Polycarbophil (FIBER-CAPS PO)  Self Yes No   Sig: Take by mouth   Omega-3 Fatty Acids (FISH OIL) 1,000 mg  Self Yes No   Sig: Take 1,000 mg by mouth daily   Probiotic Product (PROBIOTIC DAILY PO)  Self Yes No   Sig: Take by mouth   Turmeric (QC TUMERIC COMPLEX PO)  Self Yes No   Sig: Take by mouth   VITAMIN D PO  Self Yes No   Sig: Take by mouth   albuterol (VENTOLIN HFA) 90 mcg/act inhaler  Self No No   Sig: Inhale 2 puffs every 6 (six) hours as needed for wheezing   colestipol (COLESTID) 1 g tablet  Self Yes No   Sig: Take 1 g by mouth 2 (two) times a day   cycloSPORINE (RESTASIS) 0 05 % ophthalmic emulsion  Self Yes No   Sig: Restasis 0 05 % eye drops in a dropperette   multivitamin (THERAGRAN) TABS  Self Yes No   Sig: Take 1 tablet by mouth daily   rivaroxaban (XARELTO) 10 mg tablet  Self No No   Sig: Take 1 tablet (10 mg total) by mouth daily      Facility-Administered Medications: None       Past Medical History:   Diagnosis Date    Asthma     Colon polyp     CPAP (continuous positive airway pressure) dependence     DVT (deep venous thrombosis) (HCC)     LLE    Hyperlipidemia     Hypertension     Kidney stone     Pulmonary emboli (HCC)     Sleep apnea        Past Surgical History:   Procedure Laterality Date    COLONOSCOPY      HEMORRHOID SURGERY N/A 2020    Procedure: HEMORRHOIDECTOMY EXCISION examination under anesthesia; Surgeon: Shalini Espino MD;  Location: [facility];  Service: General    ROTATOR CUFF REPAIR      SINUS SURGERY         Family History   Problem Relation Age of Onset    No Known Problems Mother     Colon cancer Father 68     I have reviewed and agree with the history as documented  E-Cigarette/Vaping    E-Cigarette Use Never User      E-Cigarette/Vaping Substances    Nicotine No     THC No     CBD No     Flavoring No     Other No     Unknown No      Social History     Tobacco Use    Smoking status: Former Smoker     Packs/day:  00     Years: 15 00     Pack years: 15 00     Types: Cigarettes, Cigars     Quit date:      Years since quittin 3    Smokeless tobacco: Never Used   Substance Use Topics    Alcohol use: Yes     Frequency: Monthly or less     Drinks per session: 1 or 2     Comment: rare    Drug use: Never       Review of Systems   Constitutional: Negative for chills and fever  HENT: Negative for rhinorrhea, sore throat and trouble swallowing  Eyes: Negative for pain  Respiratory: Negative for cough, shortness of breath, wheezing and stridor  Cardiovascular: Positive for chest pain  Negative for leg swelling  Gastrointestinal: Negative for abdominal pain, diarrhea and nausea  Endocrine: Negative for polyuria  Genitourinary: Negative for dysuria, flank pain and urgency  Musculoskeletal: Positive for back pain  Negative for joint swelling, myalgias and neck stiffness  Skin: Negative for rash  Allergic/Immunologic: Negative for immunocompromised state     Neurological: Negative for dizziness, syncope, weakness, numbness and headaches  Psychiatric/Behavioral: Negative for confusion and suicidal ideas  All other systems reviewed and are negative  Physical Exam  Physical Exam  Vitals signs and nursing note reviewed  Constitutional:       Appearance: He is well-developed  HENT:      Head: Normocephalic and atraumatic  Eyes:      Pupils: Pupils are equal, round, and reactive to light  Neck:      Musculoskeletal: Normal range of motion and neck supple  Cardiovascular:      Rate and Rhythm: Normal rate and regular rhythm  Heart sounds: Normal heart sounds  No murmur  No friction rub  Pulmonary:      Effort: Pulmonary effort is normal  No respiratory distress  Breath sounds: No wheezing or rales  Chest:      Chest wall: Tenderness present  Abdominal:      General: Bowel sounds are normal       Palpations: Abdomen is soft  There is no mass  Tenderness: There is no abdominal tenderness  There is no guarding  Musculoskeletal:      Thoracic back: He exhibits pain and spasm  Skin:     General: Skin is warm  Findings: No rash  Neurological:      Mental Status: He is alert and oriented to person, place, and time           Vital Signs  ED Triage Vitals   Temperature Pulse Respirations Blood Pressure SpO2   04/19/21 1713 04/19/21 1713 04/19/21 1713 04/19/21 1713 04/19/21 1713   (!) 97 2 °F (36 2 °C) 81 18 162/78 96 %      Temp Source Heart Rate Source Patient Position - Orthostatic VS BP Location FiO2 (%)   04/19/21 1713 04/19/21 1713 04/1957 04/1957 --   Oral Monitor Sitting Right arm       Pain Score       04/1957       5           Vitals:    04/19/21 1713 04/1957   BP: 162/78 143/78   Pulse: 81 74   Patient Position - Orthostatic VS:  Sitting         Visual Acuity      ED Medications  Medications   morphine (PF) 4 mg/mL injection 4 mg (4 mg Intravenous Given 4/19/21 1828)   iohexol (OMNIPAQUE) 350 MG/ML injection (MULTI-DOSE) 100 mL (100 mL Intravenous Given 4/19/21 1837)   methocarbamol (ROBAXIN) tablet 500 mg (500 mg Oral Given 4/19/21 2040)       Diagnostic Studies  Results Reviewed     Procedure Component Value Units Date/Time    APTT [[id number]] Collected: 04/19/21 1716    Lab Status: In process Specimen: Blood from Arm, Right Updated: 04/19/21 2052    Protime-INR [[id number]] Collected: 04/19/21 1716    Lab Status:  In process Specimen: Blood from Arm, Right Updated: 04/19/21 2052    Troponin I [[id number]]  (Normal) Collected: 04/19/21 1959    Lab Status: Final result Specimen: Blood from Arm, Left Updated: 04/19/21 2027     Troponin I <0 02 ng/mL     Troponin I [[id number]]  (Normal) Collected: 04/19/21 1716    Lab Status: Final result Specimen: Blood from Arm, Right Updated: 04/19/21 1751     Troponin I <0 02 ng/mL     Comprehensive metabolic panel [[id number]] Collected: 04/19/21 1716    Lab Status: Final result Specimen: Blood from Arm, Right Updated: 04/19/21 1749     Sodium 138 mmol/L      Potassium 3 8 mmol/L      Chloride 101 mmol/L      CO2 28 mmol/L      ANION GAP 9 mmol/L      BUN 18 mg/dL      Creatinine 1 17 mg/dL      Glucose 107 mg/dL      Calcium 9 0 mg/dL      AST 18 U/L      ALT 33 U/L      Alkaline Phosphatase 74 U/L      Total Protein 7 6 g/dL      Albumin 3 7 g/dL      Total Bilirubin 0 47 mg/dL      eGFR 66 ml/min/1 73sq m     Narrative:      Lucy guidelines for Chronic Kidney Disease (CKD):     Stage 1 with normal or high GFR (GFR > 90 mL/min/1 73 square meters)    Stage 2 Mild CKD (GFR = 60-89 mL/min/1 73 square meters)    Stage 3A Moderate CKD (GFR = 45-59 mL/min/1 73 square meters)    Stage 3B Moderate CKD (GFR = 30-44 mL/min/1 73 square meters)    Stage 4 Severe CKD (GFR = 15-29 mL/min/1 73 square meters)    Stage 5 End Stage CKD (GFR <15 mL/min/1 73 square meters)  Note: GFR calculation is accurate only with a steady state creatinine    CBC and differential [[id number]]  (Abnormal) Collected: 04/19/21 1716    Lab Status: Final result Specimen: Blood from Arm, Right Updated: 04/19/21 1732     WBC 5 26 Thousand/uL      RBC 5 21 Million/uL      Hemoglobin 15 9 g/dL      Hematocrit 47 4 %      MCV 91 fL      MCH 30 5 pg      MCHC 33 5 g/dL      RDW 12 4 %      MPV 10 3 fL      Platelets 376 Thousands/uL      nRBC 0 /100 WBCs      Neutrophils Relative 76 %      Immat GRANS % 1 %      Lymphocytes Relative 10 %      Monocytes Relative 11 %      Eosinophils Relative 1 %      Basophils Relative 1 %      Neutrophils Absolute 4 07 Thousands/µL      Immature Grans Absolute 0 03 Thousand/uL      Lymphocytes Absolute 0 52 Thousands/µL      Monocytes Absolute 0 56 Thousand/µL      Eosinophils Absolute 0 03 Thousand/µL      Basophils Absolute 0 05 Thousands/µL                  CT pe study w abdomen pelvis w contrast   Final Result by Lynn Talbot MD (04/19 1931)         1  No pulmonary embolism  2   No acute abnormality within the abdomen or pelvis                       Workstation performed: [id number]                    Procedures  ECG 12 Lead Documentation Only    Date/Time: 4/19/2021 6:18 PM  Performed by: Nguyễn Perez DO  Authorized by: Nguyễn Perez DO     ECG reviewed by me, the ED Provider: yes    Patient location:  ED  Previous ECG:     Previous ECG:  Compared to current    Similarity:  No change  Interpretation:     Interpretation: normal    Rate:     ECG rate assessment: normal    Rhythm:     Rhythm: sinus rhythm    Ectopy:     Ectopy: none    QRS:     QRS axis:  Normal    QRS intervals:  Normal  Conduction:     Conduction: normal    ST segments:     ST segments:  Normal  T waves:     T waves: normal               ED Course  ED Course as of Apr 19 2053   Mon Apr 19, 2021   1936  1 9 x 1 3 cm hypoattenuating lesion with peripheral puddling of contrast within segment 8 is more conspicuous on the current study but does appear stable compared to CT of the chest 10/5/2020 and most likely represents hemangioma          1936 Spoke with the patient about these findings         2029 2nd trop negative, will dc home                   HEART Risk Score      Most Recent Value   Heart Score Risk Calculator   History  0 Filed at: 04/19/2021 2030   ECG  0 Filed at: 04/19/2021 2030   Age  1 Filed at: 04/19/2021 2030   Risk Factors  1 Filed at: 04/19/2021 2030   Troponin  0 Filed at: 04/19/2021 2030   HEART Score  2 Filed at: 04/19/2021 2030                          Cheryl Ashford' Criteria for PE      Most Recent Value   Wells' Criteria for PE   Clinical signs and symptoms of DVT  0 Filed at: 04/19/2021 4314   PE is primary diagnosis or equally likely  3 Filed at: 04/19/2021 1817   HR >100  0 Filed at: 04/19/2021 1817   Immobilization at least 3 days or Surgery in the previous 4 weeks  0 Filed at: 04/19/2021 1817   Previous, objectively diagnosed PE or DVT  1 5 Filed at: 04/19/2021 1817   Hemoptysis  0 Filed at: 04/19/2021 1817   Malignancy with treatment within 6 months or palliative  0 Filed at: 04/19/2021 1817   Wells' Criteria Total  4 5 Filed at: 04/19/2021 1817                MDM  Number of Diagnoses or Management Options  Chest pain: new and requires workup  Diagnosis management comments: Background: 63 y o  male with chest pain, radiation to the back, yesterday with noted 2nd covid vaccine  Differential DX includes but is not limited to: acs/mi, pe, pleurisy, dissection, pneumonia, musculoskeletal chest pain    Plan: cardiac workup    Workup in the ED unremarkable  , noted hypodensity on ct scan informed patient of this result and need repeat ct scan for f/u, improved in the ED with trop negative x2  Plan dc home     Pt re-examined and evaluated after testing and treatment  Spoke with the patient and feeling improved and sxs have resolved  Will discharge home with close f/u with pcp and instructed to return to the ED if sxs worsen or continue   Pt agrees with the plan for discharge and feels comfortable to go home with proper f/u  Advised to return for worsening or additional problems  Diagnostic tests were reviewed and questions answered  Diagnosis, care plan and treatment options were discussed  The patient understand instructions and will follow up as directed  Counseling: I had a detailed discussion with the patient and/or guardian regarding: the historical points, exam findings, and any diagnostic results supporting the discharge diagnosis, lab results, radiology results, discharge instructions reviewed with patient and/or family/caregiver and understanding was verbalized  Instructions given to return to the emergency department if symptoms worsen or persist, or if there are any questions or concerns that arise at home  All labs reviewed and utilized in the medical decision making process    All radiology studies independently viewed by me and interpreted by the radiologist              Amount and/or Complexity of Data Reviewed  Clinical lab tests: reviewed and ordered  Tests in the radiology section of CPT®: ordered and reviewed  Decide to obtain previous medical records or to obtain history from someone other than the patient: yes  Review and summarize past medical records: yes  Independent visualization of images, tracings, or specimens: yes        Disposition  Final diagnoses:   Chest pain     Time reflects when diagnosis was documented in both MDM as applicable and the Disposition within this note     Time User Action Codes Description Comment    4/19/2021  8:29 PM Paresh Lynch Add [R07 9] Chest pain       ED Disposition     ED Disposition Condition Date/Time Comment    Discharge Stable Mon Apr 19, 2021  8:29 PM [address] discharge to home/self care              Follow-up Information     Follow up With Specialties Details Why [address] [facility],[address], MD Internal Medicine   [address]  [phone]            Discharge Medication List as of 4/19/2021  8:30 PM START taking these medications    Details   methocarbamol (ROBAXIN) 500 mg tablet Take 1 tablet (500 mg total) by mouth 2 (two) times a day, Starting Mon 4/19/2021, Normal         CONTINUE these medications which have NOT CHANGED    Details   albuterol (VENTOLIN HFA) 90 mcg/act inhaler Inhale 2 puffs every 6 (six) hours as needed for wheezing, Starting Wed 1/29/2020, Normal      Ascorbic Acid (VITAMIN C PO) Take by mouth, Historical Med      CALCIUM ASCORBATE PO Take by mouth, Historical Med      Calcium Polycarbophil (FIBER-CAPS PO) Take by mouth, Historical Med      colestipol (COLESTID) 1 g tablet Take 1 g by mouth 2 (two) times a day, Historical Med      cycloSPORINE (RESTASIS) 0 05 % ophthalmic emulsion Restasis 0 05 % eye drops in a dropperette, Historical Med      multivitamin (THERAGRAN) TABS Take 1 tablet by mouth daily, Historical Med      Omega-3 Fatty Acids (FISH OIL) 1,000 mg Take 1,000 mg by mouth daily, Historical Med      Probiotic Product (PROBIOTIC DAILY PO) Take by mouth, Historical Med      rivaroxaban (XARELTO) 10 mg tablet Take 1 tablet (10 mg total) by mouth daily, Starting Mon 11/2/2020, Normal      Turmeric (QC TUMERIC COMPLEX PO) Take by mouth, Historical Med      VITAMIN D PO Take by mouth, Historical Med           No discharge procedures on file      PDMP Review       Value Time User    PDMP Reviewed  Yes 9/29/2020  5:04 PM Yuri Yoon MD          ED Provider  Electronically Signed by           Asad Murrieta DO  04/19/21 2053

## 2021-04-20 LAB
ATRIAL RATE: 81 BPM
ATRIAL RATE: 81 BPM
P AXIS: 150 DEGREES
P AXIS: 29 DEGREES
PR INTERVAL: 190 MS
PR INTERVAL: 190 MS
QRS AXIS: 146 DEGREES
QRS AXIS: 26 DEGREES
QRSD INTERVAL: 90 MS
QRSD INTERVAL: 92 MS
QT INTERVAL: 356 MS
QT INTERVAL: 356 MS
QTC INTERVAL: 403 MS
QTC INTERVAL: 406 MS
T WAVE AXIS: 156 DEGREES
T WAVE AXIS: 17 DEGREES
VENTRICULAR RATE: 77 BPM
VENTRICULAR RATE: 78 BPM

## 2021-04-20 PROCEDURE — 93010 ELECTROCARDIOGRAM REPORT: CPT | Performed by: INTERNAL MEDICINE

## 2021-05-12 DIAGNOSIS — I82.593 CHRONIC DEEP VEIN THROMBOSIS (DVT) OF OTHER VEIN OF BOTH LOWER EXTREMITIES (HCC): ICD-10-CM

## 2021-05-13 RX ORDER — RIVAROXABAN 10 MG/1
TABLET, FILM COATED ORAL
Qty: 90 TABLET | Refills: 1 | OUTPATIENT
Start: 2021-05-13

## 2021-05-13 NOTE — TELEPHONE ENCOUNTER
Patient was told to obtain prescription from PCP  Please call him and remind him of this  I did not fill Xarelto request from mail away pharmacy

## 2021-05-20 NOTE — PROGRESS NOTES
Assessment and Plan:   Mr Randa Ng is a 63year old male with history significant for obstructive sleep apnea on CPAP as well as a left lower extremity deep vein thrombosis on chronic anticoagulation (with an isolated occurrence of a positive lupus anticoagulant), who presents for further evaluation of diffuse body pain  He is self-referred today  Matthew Jerica presents today for further evaluation of widespread arthralgias and myalgias he has been experiencing for more than 5 years now associated with fatigue and nonrestorative sleep habits  Based on the description of his symptoms with chronic diffuse pain as well as lack of inflammatory findings on his examination today, his presentation appears to be consistent with fibromyalgia as well as possibly osteoarthritis affecting certain joints given his pain does worsen with activities and he will notice some relief with resting  For management of the non inflammatory etiologies to his pain I recommend he follow-up with his primary care physician  To ensure there are no concerns for an inflammatory process I will also complete the workup as listed below and contact him with the results  Plan:  Diagnoses and all orders for this visit:    Diffuse arthralgia  -     Chronic Hepatitis Panel; Future  -     C-reactive protein; Future  -     Sedimentation rate, automated; Future  -     RF Screen w/ Reflex to Titer; Future  -     Cyclic citrul peptide antibody, IgG; Future  -     Sjogren's Antibodies; Future  -     Uric acid; Future    Vitamin D deficiency  -     Vitamin D 25 hydroxy; Future    Thyroid disorder screen  -     TSH, 3rd generation;  Future    History of sleep apnea    Other orders  -     rosuvastatin (CRESTOR) 10 MG tablet; rosuvastatin 10 mg tablet   TAKE 1 TABLET BY MOUTH EVERY DAY AT BEDTIME  -     senna (Senna-Time) 8 6 MG tablet; Daily  -     sertraline (ZOLOFT) 25 mg tablet; sertraline 25 mg tablet   take 1 tablet by mouth once daily      I have personally reviewed pertinent films in PACS of the CT chest and abdomen which is unremarkable  Activities as tolerated  Exercise: try to maintain a low impact exercise regimen as much as possible  Walk for 30 minutes a day for at least 3 days a week  Continue other medications as prescribed by PCP and other specialists  RTC PRN  HPI  Mr  Olivia President is a 26-year-old male with history significant for obstructive sleep apnea on CPAP as well as a left lower extremity deep vein thrombosis on chronic anticoagulation (with an isolated occurrence of a positive lupus anticoagulant), who presents for further evaluation of diffuse body pain  He is self-referred today  Patient reports he has experienced widespread body and muscle pain for more than 5 years now but states that this has gradually progressed over the years  His pain is present on a daily basis in a constant manner and is usually worse in the morning as well as night time with flare-ups during the day as well  His pain worsens with any type of activity and he does obtain some relief with resting  He reports for example after shoveling snow he may experience body pain for a few days after such strenuous activities  His pain essentially affects his hands, wrists, elbows, shoulders, hips, knees, ankles, feet as well as throughout the muscles of his arms and legs  He feels like his entire body has been swollen but will notice this specifically at his hands, shoulders, knees and feet  He experiences morning stiffness which affects him diffusely and takes at least 1 hour to improve  He has previously tried Tylenol but tries to avoid any over-the-counter pain medications although this does help  He avoids NSAID use as he is on chronic anticoagulation  He currently manages his symptoms with turmeric supplements, fish oil as well as glucosamine chondroitin supplements and feels like these measures do help with his symptoms        He reports a history of obstructive sleep apnea and appears to be consistent with use of the CPAP  He will still have nonrestorative sleep habits and wakes up feeling unrefreshed  He feels fatigued during the day  He denies fevers, unintentional loss, inflammatory eye disease (does report dry eyes and mouth), skin rash, psoriasis, inflammatory bowel disease or a family history of autoimmune disease  He has had testing done in the past which showed a negative ADRIANO by immunofluorescence as well as double-stranded DNA antibody in June 2020  The following portions of the patient's history were reviewed and updated as appropriate: allergies, current medications, past family history, past medical history, past social history, past surgical history and problem list       Review of Systems  Constitutional: Negative for weight change, fevers, chills, night sweats  Positive for fatigue  ENT/Mouth: Negative for hearing changes, ear pain, nasal congestion, sinus pain, hoarseness, sore throat, rhinorrhea, swallowing difficulty  Eyes: Negative for pain, redness, discharge, vision changes  Cardiovascular: Negative for chest pain, SOB, palpitations  Respiratory: Negative for cough, sputum, wheezing, dyspnea  Gastrointestinal: Negative for nausea, vomiting, diarrhea, constipation, pain, heartburn  Genitourinary: Negative for dysuria, urinary frequency, hematuria  Musculoskeletal: As per HPI  Skin: Negative for skin rash, color changes  Neuro: Negative for weakness, numbness, tingling, loss of consciousness  Psych: Negative for anxiety, depression  Heme/Lymph: Negative for easy bruising, bleeding, lymphadenopathy          Past Medical History:   Diagnosis Date    Asthma     Colon polyp     CPAP (continuous positive airway pressure) dependence     DVT (deep venous thrombosis) (HCC)     LLE    Hyperlipidemia     Hypertension     Kidney stone     Pulmonary emboli (HCC)     Sleep apnea        Past Surgical History:   Procedure Laterality Date    COLONOSCOPY      HEMORRHOID SURGERY N/A 2020    Procedure: HEMORRHOIDECTOMY EXCISION examination under anesthesia;   Surgeon: Sandra Singleton MD;  Location: [facility];  Service: General    ROTATOR CUFF REPAIR      SINUS SURGERY         Social History     Socioeconomic History    Marital status: /Civil Union     Spouse name: Not on file    Number of children: Not on file    Years of education: Not on file    Highest education level: Not on file   Occupational History    Not on file   Social Needs    Financial resource strain: Not on file    Food insecurity     Worry: Not on file     Inability: Not on file    Transportation needs     Medical: Not on file     Non-medical: Not on file   Tobacco Use    Smoking status: Former Smoker     Packs/day: 1 00     Years: 15 00     Pack years: 15      Types: Cigarettes, Cigars     Quit date: 2000     Years since quittin 4    Smokeless tobacco: Never Used   Substance and Sexual Activity    Alcohol use: Yes     Frequency: Monthly or less     Drinks per session: 1 or 2     Comment: rare    Drug use: Never    Sexual activity: Not on file   Lifestyle    Physical activity     Days per week: Not on file     Minutes per session: Not on file    Stress: Not on file   Relationships    Social connections     Talks on phone: Not on file     Gets together: Not on file     Attends Anabaptist service: Not on file     Active member of club or organization: Not on file     Attends meetings of clubs or organizations: Not on file     Relationship status: Not on file    Intimate partner violence     Fear of current or ex partner: Not on file     Emotionally abused: Not on file     Physically abused: Not on file     Forced sexual activity: Not on file   Other Topics Concern    Not on file   Social History Narrative    Not on file       Family History   Problem Relation Age of Onset    No Known Problems Mother     Colon cancer Father 68       No Known Allergies      Current Outpatient Medications:     senna (Senna-Time) 8 6 MG tablet, Daily, Disp: , Rfl:     sertraline (ZOLOFT) 25 mg tablet, sertraline 25 mg tablet  take 1 tablet by mouth once daily, Disp: , Rfl:     albuterol (VENTOLIN HFA) 90 mcg/act inhaler, Inhale 2 puffs every 6 (six) hours as needed for wheezing, Disp: 1 Inhaler, Rfl: 11    Ascorbic Acid (VITAMIN C PO), Take by mouth, Disp: , Rfl:     CALCIUM ASCORBATE PO, Take by mouth, Disp: , Rfl:     Calcium Polycarbophil (FIBER-CAPS PO), Take by mouth, Disp: , Rfl:     colestipol (COLESTID) 1 g tablet, Take 1 g by mouth 2 (two) times a day, Disp: , Rfl:     cycloSPORINE (RESTASIS) 0 05 % ophthalmic emulsion, Restasis 0 05 % eye drops in a dropperette, Disp: , Rfl:     methocarbamol (ROBAXIN) 500 mg tablet, Take 1 tablet (500 mg total) by mouth 2 (two) times a day, Disp: 20 tablet, Rfl: 0    multivitamin (THERAGRAN) TABS, Take 1 tablet by mouth daily, Disp: , Rfl:     Omega-3 Fatty Acids (FISH OIL) 1,000 mg, Take 1,000 mg by mouth daily, Disp: , Rfl:     Probiotic Product (PROBIOTIC DAILY PO), Take by mouth, Disp: , Rfl:     rivaroxaban (XARELTO) 10 mg tablet, Take 1 tablet (10 mg total) by mouth daily, Disp: 90 tablet, Rfl: 1    rosuvastatin (CRESTOR) 10 MG tablet, rosuvastatin 10 mg tablet  TAKE 1 TABLET BY MOUTH EVERY DAY AT BEDTIME, Disp: , Rfl:     Turmeric (QC TUMERIC COMPLEX PO), Take by mouth, Disp: , Rfl:     VITAMIN D PO, Take by mouth, Disp: , Rfl:       Objective:    Vitals:    05/24/21 1109   BP: 138/80   BP Location: Left arm   Patient Position: Sitting   Cuff Size: Extra-Large   Pulse: 66   Temp: 99 4 °F (37 4 °C)   Weight: 84 4 kg (186 lb)       Physical Exam  General: Well appearing, well nourished, in no distress  Oriented x 3, normal mood and affect  Ambulating without difficulty  Skin: Good turgor, no rash, unusual bruising or prominent lesions    Hair: Normal texture and distribution  Nails: Normal color, no deformities  HEENT:  Head: Normocephalic, atraumatic  Eyes: Conjunctiva clear, sclera non-icteric, EOM intact  Extremities: No amputations or deformities, cyanosis, edema  Musculoskeletal:   There is no peripheral joint soft tissue swelling or tenderness on his exam today  He does have myofascial tenderness present in his anterior chest and reports that the other fibromyalgia points may also be tender but deeper pressure needs to be applied  Neurologic: Alert and oriented  No focal neurological deficits appreciated  Psychiatric: Normal mood and affect  ESTER Norton    Rheumatology

## 2021-05-24 ENCOUNTER — OFFICE VISIT (OUTPATIENT)
Dept: RHEUMATOLOGY | Facility: CLINIC | Age: 66
End: 2021-05-24
Payer: COMMERCIAL

## 2021-05-24 VITALS
DIASTOLIC BLOOD PRESSURE: 80 MMHG | WEIGHT: 186 LBS | SYSTOLIC BLOOD PRESSURE: 138 MMHG | BODY MASS INDEX: 27.47 KG/M2 | TEMPERATURE: 99.4 F | HEART RATE: 66 BPM

## 2021-05-24 DIAGNOSIS — E55.9 VITAMIN D DEFICIENCY: ICD-10-CM

## 2021-05-24 DIAGNOSIS — M25.50 DIFFUSE ARTHRALGIA: Primary | ICD-10-CM

## 2021-05-24 DIAGNOSIS — Z13.29 THYROID DISORDER SCREEN: ICD-10-CM

## 2021-05-24 DIAGNOSIS — Z86.69 HISTORY OF SLEEP APNEA: ICD-10-CM

## 2021-05-24 PROCEDURE — 99205 OFFICE O/P NEW HI 60 MIN: CPT | Performed by: INTERNAL MEDICINE

## 2021-05-24 PROCEDURE — 1036F TOBACCO NON-USER: CPT | Performed by: INTERNAL MEDICINE

## 2021-05-24 RX ORDER — SERTRALINE HYDROCHLORIDE 25 MG/1
TABLET, FILM COATED ORAL
COMMUNITY
Start: 2021-03-18

## 2021-05-24 RX ORDER — SENNA PLUS 8.6 MG/1
TABLET ORAL DAILY
COMMUNITY
Start: 2021-02-27

## 2021-05-24 RX ORDER — ROSUVASTATIN CALCIUM 10 MG/1
TABLET, COATED ORAL
COMMUNITY

## 2021-05-24 NOTE — PATIENT INSTRUCTIONS
Fibromyalgia   AMBULATORY CARE:   Fibromyalgia  is a long-term condition that causes pain and tender points throughout your body  Fibromyalgia can start at any age and is more common in women than in men  The exact cause is not known  The pain may be caused or triggered by hormone changes, physical injury, or intense emotional trauma  Common signs and symptoms:   · Pain and tender spots for at least 3 months    · Fatigue and trouble falling or staying asleep    · Shortness of breath or heart palpitations    · Dry eyes or sensitivity to medicines you take    · Headaches, memory problems, difficulty concentrating, or anxiety    · Numbness, muscle stiffness, or swelling of the hands and feet    Call your doctor or pain specialist if:   · You are depressed and feel you cannot cope with your condition  · Your pain increases, even after you take your pain medicine  · You have difficulty sleeping  · You have questions or concerns about your condition or care  Manage your symptoms:  Fibromyalgia can be managed but not cured  The following can help you manage your symptoms:  · Keep a pain diary  Include your symptoms and what activity caused them  This may also help you track pain cycles and show a pattern to your symptoms  · Exercise as directed  Ask your healthcare provider about the best exercise plan for you  Aerobic exercise, such as walking, and strength-training activities may decrease pain and sleep problems  Exercise such as yoga or clinton chi can also help with sleep problems  · Set a sleep schedule  Do not nap during the day  Go to bed at the same time each night  Make sure your bedroom is dark, quiet, and comfortable  Do not drink caffeine or alcohol right before you go to bed  These can make it difficult for you to sleep  Limit other liquids to help decrease your need to urinate in the night  · Reach or maintain a healthy weight  Obesity can make fibromyalgia symptoms worse   Your healthcare provider can help you create a weight loss plan if you are overweight  · Take medicines as directed  You may find relief from nerve medicines, muscle relaxers, antidepressants, or antiseizure medicines  NSAIDs, acetaminophen, or prescription pain medicines may also help but are usually not recommended first  Fibromyalgia pain is not caused by inflammation or other causes that pain medicines treat, such as an injury  You may develop other pain that responds to pain medicine  Your healthcare provider will help you manage your medicines so you do not take too much  · Ask about massage or acupuncture  Myofascial release massage may help relax and stretch tight muscles, and improve blood flow  Acupuncture may also help relieve pain  Follow up with your doctor or pain specialist as directed:  Write down your questions so you remember to ask them during your visits  For support and more information:   · National Chronic Fatigue [employer] and Fibromy[employer] Association  [address] [facility] , [address] [place] [address]  Phone: 0- 023 - [phone]  Web Address: [employer] [address]  [address] Information is for End User's use only and may not be sold, redistributed or otherwise used for commercial purposes  All illustrations and images included in CareNotes® are the copyrighted property of A D A M , Inc  or [address] [place], [place] [address]jessica   The above information is an  only  It is not intended as medical advice for individual conditions or treatments  Talk to your doctor, nurse or pharmacist before following any medical regimen to see if it is safe and effective for you

## 2021-05-26 ENCOUNTER — TELEPHONE (OUTPATIENT)
Dept: RHEUMATOLOGY | Facility: CLINIC | Age: 66
End: 2021-05-26

## 2021-05-26 LAB
25(OH)D3 SERPL-MCNC: 55 NG/ML (ref 30–100)
CCP IGG SERPL-ACNC: <16 UNITS
CRP SERPL-MCNC: 0.8 MG/L
ENA SS-A AB SER IA-ACNC: NORMAL AI
ENA SS-B AB SER IA-ACNC: NORMAL AI
ERYTHROCYTE [SEDIMENTATION RATE] IN BLOOD BY WESTERGREN METHOD: 6 MM/H
HAV IGM SERPL QL IA: NORMAL
HBV CORE IGM SERPL QL IA: NORMAL
HBV SURFACE AG SERPL QL IA: NORMAL
HCV AB S/CO SERPL IA: 0.01
HCV AB SERPL QL IA: NORMAL
RHEUMATOID FACT SERPL-ACNC: <14 IU/ML
TSH SERPL-ACNC: 2.68 MIU/L (ref 0.4–4.5)
URATE SERPL-MCNC: 5.6 MG/DL (ref 4–8)

## 2021-05-26 NOTE — TELEPHONE ENCOUNTER
----- Message from Jeannine Buenrostro MD sent at 5/26/2021  1:33 PM EDT -----  Please let him know all the autoimmune testing was normal   Thanks

## 2021-09-09 ENCOUNTER — APPOINTMENT (EMERGENCY)
Dept: CT IMAGING | Facility: HOSPITAL | Age: 66
End: 2021-09-09
Payer: COMMERCIAL

## 2021-09-09 ENCOUNTER — HOSPITAL ENCOUNTER (EMERGENCY)
Facility: HOSPITAL | Age: 66
Discharge: HOME | End: 2021-09-09
Attending: EMERGENCY MEDICINE | Admitting: EMERGENCY MEDICINE
Payer: COMMERCIAL

## 2021-09-09 VITALS
RESPIRATION RATE: 18 BRPM | OXYGEN SATURATION: 99 % | DIASTOLIC BLOOD PRESSURE: 89 MMHG | TEMPERATURE: 98.7 F | SYSTOLIC BLOOD PRESSURE: 165 MMHG | HEART RATE: 58 BPM

## 2021-09-09 DIAGNOSIS — M54.50 ACUTE LOW BACK PAIN: Primary | ICD-10-CM

## 2021-09-09 PROCEDURE — 72131 CT LUMBAR SPINE W/O DYE: CPT

## 2021-09-09 PROCEDURE — 99284 EMERGENCY DEPT VISIT MOD MDM: CPT | Performed by: EMERGENCY MEDICINE

## 2021-09-09 PROCEDURE — 96372 THER/PROPH/DIAG INJ SC/IM: CPT

## 2021-09-09 PROCEDURE — 99283 EMERGENCY DEPT VISIT LOW MDM: CPT

## 2021-09-09 RX ORDER — METHOCARBAMOL 500 MG/1
500 TABLET, FILM COATED ORAL ONCE
Status: COMPLETED | OUTPATIENT
Start: 2021-09-09 | End: 2021-09-09

## 2021-09-09 RX ORDER — METHOCARBAMOL 500 MG/1
500 TABLET, FILM COATED ORAL 2 TIMES DAILY PRN
Qty: 15 TABLET | Refills: 0 | Status: SHIPPED | OUTPATIENT
Start: 2021-09-09

## 2021-09-09 RX ORDER — OXYCODONE HYDROCHLORIDE AND ACETAMINOPHEN 5; 325 MG/1; MG/1
1 TABLET ORAL EVERY 4 HOURS PRN
Qty: 8 TABLET | Refills: 0 | Status: SHIPPED | OUTPATIENT
Start: 2021-09-09

## 2021-09-09 RX ORDER — METHYLPREDNISOLONE 4 MG/1
TABLET ORAL
Qty: 21 TABLET | Refills: 0 | Status: SHIPPED | OUTPATIENT
Start: 2021-09-09

## 2021-09-09 RX ORDER — DEXAMETHASONE SODIUM PHOSPHATE 4 MG/ML
10 INJECTION, SOLUTION INTRA-ARTICULAR; INTRALESIONAL; INTRAMUSCULAR; INTRAVENOUS; SOFT TISSUE ONCE
Status: COMPLETED | OUTPATIENT
Start: 2021-09-09 | End: 2021-09-09

## 2021-09-09 RX ORDER — OXYCODONE HYDROCHLORIDE AND ACETAMINOPHEN 5; 325 MG/1; MG/1
1 TABLET ORAL ONCE
Status: COMPLETED | OUTPATIENT
Start: 2021-09-09 | End: 2021-09-09

## 2021-09-09 RX ADMIN — OXYCODONE HYDROCHLORIDE AND ACETAMINOPHEN 1 TABLET: 5; 325 TABLET ORAL at 16:38

## 2021-09-09 RX ADMIN — DEXAMETHASONE SODIUM PHOSPHATE 10 MG: 4 INJECTION INTRA-ARTICULAR; INTRALESIONAL; INTRAMUSCULAR; INTRAVENOUS; SOFT TISSUE at 16:37

## 2021-09-09 RX ADMIN — METHOCARBAMOL 500 MG: 500 TABLET ORAL at 16:38

## 2021-09-09 NOTE — ED PROVIDER NOTES
History  Chief Complaint   Patient presents with    Back Pain     c/o aching back/neck pain that radiates to bilateral legs, pt  was lifting heavy at work on Friday     63year-old male with history of DVT on Xarelto, presents the ED for evaluation of low back pain  He states that about 6 days ago, last Friday he was helping his boss at work lift a heavy bag into a dumpster, he felt pain in his low back immediately after  It was not severe at that point but has gradually worsened  He states that now pain is radiating up and down his back and into both legs down to the level of the knee  He denies any weakness, numbness or tingling to his legs  He has no loss of bowel or bladder function  No fevers or chills  He denies any prior similar injuries  He has only taken Tylenol so far for the pain, was told not to take NSAIDs due to being on Xarelto  History provided by:  Patient   used: No    Back Pain  Location:  Lumbar spine and thoracic spine      Prior to Admission Medications   Prescriptions Last Dose Informant Patient Reported? Taking?    Ascorbic Acid (VITAMIN C PO)  Self Yes Yes   Sig: Take by mouth   CALCIUM ASCORBATE PO  Self Yes Yes   Sig: Take by mouth   Calcium Polycarbophil (FIBER-CAPS PO)  Self Yes Yes   Sig: Take by mouth   Omega-3 Fatty Acids (FISH OIL) 1,000 mg  Self Yes Yes   Sig: Take 1,000 mg by mouth daily   Probiotic Product (PROBIOTIC DAILY PO)  Self Yes Yes   Sig: Take by mouth   Turmeric (QC TUMERIC COMPLEX PO)  Self Yes Yes   Sig: Take by mouth   VITAMIN D PO  Self Yes Yes   Sig: Take by mouth   albuterol (VENTOLIN HFA) 90 mcg/act inhaler  Self No Yes   Sig: Inhale 2 puffs every 6 (six) hours as needed for wheezing   colestipol (COLESTID) 1 g tablet Not Taking at Unknown time Self Yes No   Sig: Take 1 g by mouth 2 (two) times a day   Patient not taking: Reported on 9/9/2021   cycloSPORINE (RESTASIS) 0 05 % ophthalmic emulsion  Self Yes Yes   Sig: Restasis 0 05 % eye drops in a dropperette   methocarbamol (ROBAXIN) 500 mg tablet Not Taking at Unknown time  No No   Sig: Take 1 tablet (500 mg total) by mouth 2 (two) times a day   Patient not taking: Reported on 9/9/2021   multivitamin (THERAGRAN) TABS  Self Yes Yes   Sig: Take 1 tablet by mouth daily   rivaroxaban (XARELTO) 10 mg tablet  Self No Yes   Sig: Take 1 tablet (10 mg total) by mouth daily   rosuvastatin (CRESTOR) 10 MG tablet   Yes Yes   Sig: rosuvastatin 10 mg tablet   TAKE 1 TABLET BY MOUTH EVERY DAY AT BEDTIME   senna (Senna-Time) 8 6 MG tablet Not Taking at Unknown time  Yes No   Sig: Daily   Patient not taking: Reported on 9/9/2021   sertraline (ZOLOFT) 25 mg tablet Not Taking at Unknown time  Yes No   Sig: sertraline 25 mg tablet   take 1 tablet by mouth once daily   Patient not taking: Reported on 9/9/2021      Facility-Administered Medications: None       Past Medical History:   Diagnosis Date    Asthma     Colon polyp     CPAP (continuous positive airway pressure) dependence     DVT (deep venous thrombosis) (HCC)     LLE    Hyperlipidemia     Hypertension     Kidney stone     Pulmonary emboli (HCC)     Sleep apnea        Past Surgical History:   Procedure Laterality Date    COLONOSCOPY      HEMORRHOID SURGERY N/A 9/29/2020    Procedure: HEMORRHOIDECTOMY EXCISION examination under anesthesia; Surgeon: Jay Leon MD;  [facility];  Service: General    ROTATOR CUFF REPAIR      SINUS SURGERY         Family History   Problem Relation Age of Onset    No Known Problems Mother     Colon cancer Father 68     I have reviewed and agree with the history as documented      E-Cigarette/Vaping    E-Cigarette Use Never User      E-Cigarette/Vaping Substances    Nicotine No     THC No     CBD No     Flavoring No     Other No     Unknown No      Social History     Tobacco Use    Smoking status: Former Smoker     Packs/day: 1 00     Years: 15 00     Pack years: 15 00     Types: Cigarettes, Cigars     Quit date:      Years since quittin 7    Smokeless tobacco: Never Used   Vaping Use    Vaping Use: Never used   Substance Use Topics    Alcohol use: Yes     Comment: rare    Drug use: Never       Review of Systems   Musculoskeletal: Positive for back pain  All other systems reviewed and are negative  Physical Exam  Physical Exam  Vitals and nursing note reviewed  Constitutional:       General: He is not in acute distress  Appearance: Normal appearance  He is normal weight  HENT:      Head: Normocephalic and atraumatic  Right Ear: External ear normal       Left Ear: External ear normal       Nose: Nose normal  No congestion or rhinorrhea  Mouth/Throat:      Mouth: Mucous membranes are moist       Pharynx: Oropharynx is clear  Eyes:      General: No scleral icterus  Right eye: No discharge  Left eye: No discharge  Conjunctiva/sclera: Conjunctivae normal    Cardiovascular:      Rate and Rhythm: Normal rate and regular rhythm  Pulses: Normal pulses  Heart sounds: Normal heart sounds  Pulmonary:      Effort: Pulmonary effort is normal  No respiratory distress  Breath sounds: Normal breath sounds  Abdominal:      General: Abdomen is flat  Palpations: Abdomen is soft  Tenderness: There is no abdominal tenderness  Musculoskeletal:         General: No swelling  Normal range of motion  Cervical back: Normal range of motion and neck supple  Comments: + bilateral muscle spasm and tenderness from cervical through lumbar spine  No midline spinal tenderness  + straight leg raise test on the right at 45 deg hip flexion  DTR's 2+ in legs  Sensation and motor intact (5/5)  Skin:     General: Skin is warm and dry  Capillary Refill: Capillary refill takes less than 2 seconds  Neurological:      General: No focal deficit present  Mental Status: He is alert and oriented to person, place, and time   Mental status is at baseline  Psychiatric:         Mood and Affect: Mood normal          Behavior: Behavior normal          Vital Signs  ED Triage Vitals   Temperature Pulse Respirations Blood Pressure SpO2   09/09/21 1215 09/09/21 1215 09/09/21 1215 09/09/21 1215 09/09/21 1215   98 1 °F (36 7 °C) 69 18 (!) 177/88 97 %      Temp Source Heart Rate Source Patient Position - Orthostatic VS BP Location FiO2 (%)   09/09/21 1215 09/09/21 1215 09/09/21 1215 09/09/21 1215 --   Oral Monitor Sitting Right arm       Pain Score       09/09/21 1750       3           Vitals:    09/09/21 1215 09/09/21 1750   BP: (!) 177/88 165/89   Pulse: 69 58   Patient Position - Orthostatic VS: Sitting Lying         Visual Acuity      ED Medications  Medications   oxyCODONE-acetaminophen (PERCOCET) 5-325 mg per tablet 1 tablet (1 tablet Oral Given 9/9/21 1638)   dexamethasone (DECADRON) injection 10 mg (10 mg Intramuscular Given 9/9/21 1637)   methocarbamol (ROBAXIN) tablet 500 mg (500 mg Oral Given 9/9/21 1638)       Diagnostic Studies  Results Reviewed     None                 CT lumbar spine without contrast   Final Result by Sharmaine Llanos MD (09/09 1734)      Mild disc disease L3-L4 and L4-L5 without significant central canal stenosis or neural foraminal narrowing  Workstation performed: [id number]                    Procedures  Procedures         ED Course  ED Course as of Sep 09 2359   Thu Sep 09, 2021   1812 Pt feeling better, mild disc disease w/ bulging at several levels  Will d/c home w/ comprehensive spine PT f/u       1856 Mild disc disease L3-L4 and L4-L5 without significant central canal stenosis or neural foraminal narrowing  SBIRT 20yo+      Most Recent Value   SBIRT (22 yo +)   In order to provide better care to our patients, we are screening all of our patients for alcohol and drug use  Would it be okay to ask you these screening questions?   Yes Filed at: 09/09/2021 1524   Initial Alcohol Screen: US AUDIT-C    1  How often do you have a drink containing alcohol? 1 Filed at: 09/09/2021 1524   2  How many drinks containing alcohol do you have on a typical day you are drinking? 0 Filed at: 09/09/2021 1524   3a  Male UNDER 65: How often do you have five or more drinks on one occasion? 1 Filed at: 09/09/2021 1524   Audit-C Score  2 Filed at: 09/09/2021 1524   BRIGIDA: How many times in the past year have you    Used an illegal drug or used a prescription medication for non-medical reasons? Never Filed at: 09/09/2021 1524                    MDM  Number of Diagnoses or Management Options  Acute low back pain: new and requires workup     Amount and/or Complexity of Data Reviewed  Tests in the radiology section of CPT®: ordered and reviewed    Risk of Complications, Morbidity, and/or Mortality  Presenting problems: moderate  Diagnostic procedures: moderate  Management options: moderate    Patient Progress  Patient progress: stable      Disposition  Final diagnoses:   Acute low back pain     Time reflects when diagnosis was documented in both MDM as applicable and the Disposition within this note     Time User Action Codes Description Comment    9/9/2021  6:57 PM Maria Eugenia Pearl Add [M54 5] Acute low back pain       ED Disposition     ED Disposition Condition Date/Time Comment    Discharge Stable u Sep 9, 2021  6:57 PM [address] discharge to home/self care              Follow-up Information     Follow up With Specialties Details Why Contact Info Additional Information    Britni Rodriguez MD Internal Medicine In 3 days  [address] [facility] Physical Therapy In 3 days  [phone]          Discharge Medication List as of 9/9/2021  7:01 PM      START taking these medications    Details   !! methocarbamol (ROBAXIN) 500 mg tablet Take 1 tablet (500 mg total) by mouth 2 (two) times a day as needed for muscle spasms, Starting Thu 9/9/2021, Normal      methylPREDNISolone 4 MG tablet therapy pack Use as directed on package, Normal      oxyCODONE-acetaminophen (PERCOCET) 5-325 mg per tablet Take 1 tablet by mouth every 4 (four) hours as needed for moderate pain for up to 10 dosesMax Daily Amount: 6 tablets, Starting Thu 9/9/2021, Normal       !! - Potential duplicate medications found  Please discuss with provider        CONTINUE these medications which have NOT CHANGED    Details   albuterol (VENTOLIN HFA) 90 mcg/act inhaler Inhale 2 puffs every 6 (six) hours as needed for wheezing, Starting Wed 1/29/2020, Normal      Ascorbic Acid (VITAMIN C PO) Take by mouth, Historical Med      CALCIUM ASCORBATE PO Take by mouth, Historical Med      Calcium Polycarbophil (FIBER-CAPS PO) Take by mouth, Historical Med      cycloSPORINE (RESTASIS) 0 05 % ophthalmic emulsion Restasis 0 05 % eye drops in a dropperette, Historical Med      multivitamin (THERAGRAN) TABS Take 1 tablet by mouth daily, Historical Med      Omega-3 Fatty Acids (FISH OIL) 1,000 mg Take 1,000 mg by mouth daily, Historical Med      Probiotic Product (PROBIOTIC DAILY PO) Take by mouth, Historical Med      rivaroxaban (XARELTO) 10 mg tablet Take 1 tablet (10 mg total) by mouth daily, Starting Mon 11/2/2020, Normal      rosuvastatin (CRESTOR) 10 MG tablet rosuvastatin 10 mg tablet   TAKE 1 TABLET BY MOUTH EVERY DAY AT BEDTIME, Historical Med      Turmeric (QC TUMERIC COMPLEX PO) Take by mouth, Historical Med      VITAMIN D PO Take by mouth, Historical Med      colestipol (COLESTID) 1 g tablet Take 1 g by mouth 2 (two) times a day, Historical Med      !! methocarbamol (ROBAXIN) 500 mg tablet Take 1 tablet (500 mg total) by mouth 2 (two) times a day, Starting Mon 4/19/2021, Normal      senna (Senna-Time) 8 6 MG tablet Daily, Starting Sat 2/27/2021, Historical Med      sertraline (ZOLOFT) 25 mg tablet sertraline 25 mg tablet   take 1 tablet by mouth once daily, Historical Med !! - Potential duplicate medications found  Please discuss with provider  No discharge procedures on file      PDMP Review       Value Time User    PDMP Reviewed  Yes 9/9/2021  6:57 PM Charley Clarke DO          ED Provider  Electronically Signed by           Charley Clarke DO  09/09/21 0513

## 2021-09-09 NOTE — Clinical Note
Vladislav Eaton was seen and treated in our emergency department on 9/9/2021             no bending, heavy lifting, twisting    Diagnosis: low back pain    Gagan Mayo  may return to work on return date  He may return on this date: 09/13/2021         If you have any questions or concerns, please don't hesitate to call        Bart Schmitt DO    ______________________________           _______________          _______________  Hospital Representative                              Date                                Time

## 2021-09-16 ENCOUNTER — EVALUATION (OUTPATIENT)
Dept: PHYSICAL THERAPY | Facility: CLINIC | Age: 66
End: 2021-09-16
Payer: COMMERCIAL

## 2021-09-16 VITALS — SYSTOLIC BLOOD PRESSURE: 170 MMHG | HEART RATE: 73 BPM | DIASTOLIC BLOOD PRESSURE: 82 MMHG

## 2021-09-16 DIAGNOSIS — M54.50 ACUTE BILATERAL LOW BACK PAIN, UNSPECIFIED WHETHER SCIATICA PRESENT: ICD-10-CM

## 2021-09-16 DIAGNOSIS — M54.16 BILATERAL LUMBAR RADICULOPATHY: Primary | ICD-10-CM

## 2021-09-16 PROCEDURE — 97162 PT EVAL MOD COMPLEX 30 MIN: CPT | Performed by: PHYSICAL THERAPIST

## 2021-09-16 PROCEDURE — 97112 NEUROMUSCULAR REEDUCATION: CPT | Performed by: PHYSICAL THERAPIST

## 2021-09-16 NOTE — PROGRESS NOTES
PT Evaluation     Today's date: 2021  Patient name: Ruben Campbell  : 1955  MRN: [id number]  Referring provider: Eleonora Cullen MD  Dx:   Encounter Diagnosis     ICD-10-CM    1  Bilateral lumbar radiculopathy  M54 16    2  Acute bilateral low back pain, unspecified whether sciatica present  M54 5        Start Time: 1215  Stop Time: 1302  Total time in clinic (min): 47 minutes     Treatment was performed by YENNIFER Sierra under the direct supervision of Lona Farley PT, DPT, OCS      Assessment  Assessment details: Ruben Campbell is a pleasant 63 y o  male who presents with low back pain, with radiating pain down the front of both legs, with tingling in both feet  The patient's greatest concerns are the pain he is experiencing and fear of not being able to keep active  No further referral appears necessary at this time based upon examination results  Primary movement impairment diagnosis of low back pain with radiating pain resulting in pathoanatomical symptoms of lumbar radiculopathy and limiting his ability to carry, exercise or recreation, go to work, lift, sleep and walk  Primary Impairments:  1) Decreased mobility  2) Decreased lower extremity strength     Etiologic factors include recent heavy lifting  Symptom irritability: moderateUnderstanding of Dx/Px/POC: good   Prognosis: good  Prognosis details: Positive prognostic indicators include acute nature of the injury, positive attitude toward recovery and good understanding of diagnosis and treatment plan options  Negative prognostic indicators include hypertension, high symptom irritability and multiple pain sites  Goals  Patient will be independent with home exercise program    Patient will be able to manage symptoms independently       Short Term Goals: to be achieved by 4 weeks  1) Patient to be independent with basic HEP  2) Decrease pain to 4/10 at its worst  3) Increase lumbar spine AROM by 25% in all deficient planes 4) Increase LE strength by 1/2 MMT grade in all deficient planes    Long Term Goals: to be achieved by discharge  1) FOTO equal to or greater than 55  2) Patient to be independent with comprehensive HEP  3) Lumbar spine ROM WNL all planes to improve a/iadls  4) Increase LE strength to 5/5 MMT grade in all planes to improve a/iadls  5) Patient to report no sleep interruption secondary to pain  6) Increase tolerance for physical activities to >30 min  Plan  Patient would benefit from: skilled physical therapy  Planned modality interventions: Modalities PRN  Planned therapy interventions: activity modification, manual therapy, neuromuscular re-education, patient education, therapeutic activities, therapeutic exercise, graded activity, home exercise program, behavior modification and self care  Frequency: 2x week  Duration in weeks: 8  Treatment plan discussed with: patient        Subjective Evaluation    History of Present Illness  Mechanism of injury: History of Current Injury: The patient reports 2 weeks ago was lifitng a heavy object (>100lbs) at the end of that day had significant low back pain  The patient took tylenol and over the weekend got gradually worse, after progressing he went to the hospital Thursday and received a CT scan, noting herniated discs of L3-L5  He was prescibed narcotics, a steroid, and a muscle relaxer, all of which helped his symptoms  Patient reports today that since his medication has ended he feels his back pain is back    Pain location/Descriptors: pain, stiffness, jabbing sensations, feet cold, numb, headaches, arm aches,   Aggravating factors: heavy lifting, bending, walking long distance,   Easing factors: muscle relaxers  24 HR pattern: progressively getting worse now that he finished his muscle relaxer  Imaging: CT scan  Special Questions: numbness, tingling in low back and down the front of the legs bilat, mainly left hand side, and both feet  Sleep: wakes up due to pain, inability to get comfortable  walking in the middle of the night helped     Occupation: custodially maintenance, using sick days right now, 2 weeks off of work per physician orders    Social Support  Steps to enter house: yes  Stairs in house: yes   Lives in: multiple-level home  Lives with: spouse    Patient Goals  Patient goals for therapy: decreased pain and return to work          Objective     Neurological Testing     Sensation     Lumbar   Left   Intact: light touch    Right   Intact: light touch    Reflexes   Left   Patellar (L4): normal (2+)    Right   Patellar (L4): normal (2+)  Achilles (S1): normal (2+)    Additional Neurological Details  (-) Nunn's    Active Range of Motion     Lumbar   Flexion:  with pain Restriction level: minimal  Extension:  WFL  Left lateral flexion:  with pain Restriction level: moderate  Right lateral flexion:  with pain Restriction level: minimal  Left rotation:  Restriction level: minimal  Right rotation:  with pain Restriction level: minimal    Joint Play     Hypomobile: T12, L1, L2, L3, L4, L5 and S1     Pain: T12, L1, L5 and S1   L1 comments: Oscillations provoked no improvement  Mechanical Assessment    Cervical      Thoracic      Lumbar    Standing flexion: repeated movements   Pain location:centralized  Standing extension: repeated movements  Pain location: peripheralized  Pain intensity: worse  Lying extension: repeated movements  Pain location: centralized  Pain intensity: worse    Strength/Myotome Testing     Lumbar   Left   Heel walk: normal  Toe walk: normal    Right   Heel walk: normal  Toe walk: normal    Left Hip   Planes of Motion   Flexion: 3+  Abduction: 4  Adduction: 4    Right Hip   Planes of Motion   Flexion: 3+  Abduction: 4  Adduction: 4    Left Knee   Flexion: 4  Extension: 4    Right Knee   Flexion: 4  Extension: 4    Left Ankle/Foot   Great toe extension: 5    Right Ankle/Foot   Great toe extension: 5    Tests     Lumbar     Left   Positive passive SLR    Negative crossed SLR  Right   Positive passive SLR  Negative crossed SLR                Precautions: PE, DVT (history), Asthma      Manuals 9/16/21            Lumbar PAIVM             Lumbar STM                                       Neuro Re-Ed             Sciatic nerve flossing             Multifidus press hooklying                                                                              Ther Ex             Prone pressups On elbows   HEP            SLR                          BKTC                                                                 Ther Activity                                       Gait Training                                       Modalities                                       Assessment IE            Education Prognosis, HEP, POC

## 2021-09-16 NOTE — LETTER
2021    Livier Campbell    Patient: Ananda Nuñez   YOB: 1955   Date of Visit: 2021     Encounter Diagnosis     ICD-10-CM    1  Bilateral lumbar radiculopathy  M54 16    2  Acute bilateral low back pain, unspecified whether sciatica present  M54 5        Dear Dr Reuben Cabrera: Thank you for your recent referral of Ananda Nuñez  Please review the attached evaluation summary from Augusto's recent visit  Please verify that you agree with the plan of care by signing the attached order  If you have any questions or concerns, please do not hesitate to call  I sincerely appreciate the opportunity to share in the care of one of your patients and hope to have another opportunity to work with you in the near future  Sincerely,    Virgilio Buckner, PT      Referring Provider:      I certify that I have read the below Plan of Care and certify the need for these services furnished under this plan of treatment while under my care  Anders Quijano MD  [phone]  Via Fax: [phone]          PT Evaluation     Today's date: 2021  Patient name: Ananda Nuñez  : 1955  MRN: [id number]  Referring provider: Nathan Fry MD  Dx:   Encounter Diagnosis     ICD-10-CM    1  Bilateral lumbar radiculopathy  M54 16    2  Acute bilateral low back pain, unspecified whether sciatica present  M54 5        Start Time: 1215  Stop Time: 1302  Total time in clinic (min): 47 minutes     Treatment was performed by YENNIFER Escobedo under the direct supervision of Korin Gonsalez PT, DPT, OCS      Assessment  Assessment details: Ananda Nuñez is a pleasant 63 y o  male who presents with low back pain, with radiating pain down the front of both legs, with tingling in both feet  The patient's greatest concerns are the pain he is experiencing and fear of not being able to keep active        No further referral appears necessary at this time based upon examination results  Primary movement impairment diagnosis of low back pain with radiating pain resulting in pathoanatomical symptoms of lumbar radiculopathy and limiting his ability to carry, exercise or recreation, go to work, lift, sleep and walk  Primary Impairments:  1) Decreased mobility  2) Decreased lower extremity strength     Etiologic factors include recent heavy lifting  Symptom irritability: moderateUnderstanding of Dx/Px/POC: good   Prognosis: good  Prognosis details: Positive prognostic indicators include acute nature of the injury, positive attitude toward recovery and good understanding of diagnosis and treatment plan options  Negative prognostic indicators include hypertension, high symptom irritability and multiple pain sites  Goals  Patient will be independent with home exercise program    Patient will be able to manage symptoms independently  Short Term Goals: to be achieved by 4 weeks  1) Patient to be independent with basic HEP  2) Decrease pain to 4/10 at its worst  3) Increase lumbar spine AROM by 25% in all deficient planes   4) Increase LE strength by 1/2 MMT grade in all deficient planes    Long Term Goals: to be achieved by discharge  1) FOTO equal to or greater than 55  2) Patient to be independent with comprehensive HEP  3) Lumbar spine ROM WNL all planes to improve a/iadls  4) Increase LE strength to 5/5 MMT grade in all planes to improve a/iadls  5) Patient to report no sleep interruption secondary to pain  6) Increase tolerance for physical activities to >30 min  Plan  Patient would benefit from: skilled physical therapy  Planned modality interventions: Modalities PRN    Planned therapy interventions: activity modification, manual therapy, neuromuscular re-education, patient education, therapeutic activities, therapeutic exercise, graded activity, home exercise program, behavior modification and self care  Frequency: 2x week  Duration in weeks: 8  Treatment plan discussed with: patient        Subjective Evaluation    History of Present Illness  Mechanism of injury: History of Current Injury: The patient reports 2 weeks ago was lifitng a heavy object (>100lbs) at the end of that day had significant low back pain  The patient took tylenol and over the weekend got gradually worse, after progressing he went to the hospital Thursday and received a CT scan, noting herniated discs of L3-L5  He was prescibed narcotics, a steroid, and a muscle relaxer, all of which helped his symptoms  Patient reports today that since his medication has ended he feels his back pain is back  Pain location/Descriptors: pain, stiffness, jabbing sensations, feet cold, numb, headaches, arm aches,   Aggravating factors: heavy lifting, bending, walking long distance,   Easing factors: muscle relaxers  24 HR pattern: progressively getting worse now that he finished his muscle relaxer  Imaging: CT scan  Special Questions: numbness, tingling in low back and down the front of the legs bilat, mainly left hand side, and both feet  Sleep: wakes up due to pain, inability to get comfortable  walking in the middle of the night helped     Occupation: custodially maintenance, using sick days right now, 2 weeks off of work per physician orders    Social Support  Steps to enter house: yes  Stairs in house: yes   Lives in: multiple-level home  Lives with: spouse    Patient Goals  Patient goals for therapy: decreased pain and return to work          Objective     Neurological Testing     Sensation     Lumbar   Left   Intact: light touch    Right   Intact: light touch    Reflexes   Left   Patellar (L4): normal (2+)    Right   Patellar (L4): normal (2+)  Achilles (S1): normal (2+)    Additional Neurological Details  (-) Nunn's    Active Range of Motion     Lumbar   Flexion:  with pain Restriction level: minimal  Extension:  WFL  Left lateral flexion:  with pain Restriction level: moderate  Right lateral flexion:  with pain Restriction level: minimal  Left rotation:  Restriction level: minimal  Right rotation:  with pain Restriction level: minimal    Joint Play     Hypomobile: T12, L1, L2, L3, L4, L5 and S1     Pain: T12, L1, L5 and S1   L1 comments: Oscillations provoked no improvement  Mechanical Assessment    Cervical      Thoracic      Lumbar    Standing flexion: repeated movements   Pain location:centralized  Standing extension: repeated movements  Pain location: peripheralized  Pain intensity: worse  Lying extension: repeated movements  Pain location: centralized  Pain intensity: worse    Strength/Myotome Testing     Lumbar   Left   Heel walk: normal  Toe walk: normal    Right   Heel walk: normal  Toe walk: normal    Left Hip   Planes of Motion   Flexion: 3+  Abduction: 4  Adduction: 4    Right Hip   Planes of Motion   Flexion: 3+  Abduction: 4  Adduction: 4    Left Knee   Flexion: 4  Extension: 4    Right Knee   Flexion: 4  Extension: 4    Left Ankle/Foot   Great toe extension: 5    Right Ankle/Foot   Great toe extension: 5    Tests     Lumbar     Left   Positive passive SLR  Negative crossed SLR  Right   Positive passive SLR  Negative crossed SLR                Precautions: PE, DVT (history), Asthma      Manuals 9/16/21            Lumbar PAIVM             Lumbar STM                                       Neuro Re-Ed             Sciatic nerve flossing             Multifidus press hooklying                                                                              Ther Ex             Prone pressups On elbows   HEP            SLR                          BKTC                                                                 Ther Activity                                       Gait Training                                       Modalities                                       Assessment IE            Education Prognosis, HEP, POC

## 2021-09-20 ENCOUNTER — OFFICE VISIT (OUTPATIENT)
Dept: PHYSICAL THERAPY | Facility: CLINIC | Age: 66
End: 2021-09-20
Payer: COMMERCIAL

## 2021-09-20 DIAGNOSIS — M54.50 ACUTE BILATERAL LOW BACK PAIN, UNSPECIFIED WHETHER SCIATICA PRESENT: ICD-10-CM

## 2021-09-20 DIAGNOSIS — M54.16 BILATERAL LUMBAR RADICULOPATHY: Primary | ICD-10-CM

## 2021-09-20 PROCEDURE — 97112 NEUROMUSCULAR REEDUCATION: CPT | Performed by: PHYSICAL THERAPIST

## 2021-09-20 NOTE — PROGRESS NOTES
Daily Note     Today's date: 2021  Patient name: Nadine Hanson  : 1955  MRN: [id number]  Referring provider: Jyothi Zhang MD  Dx:   Encounter Diagnosis     ICD-10-CM    1  Bilateral lumbar radiculopathy  M54 16    2  Acute bilateral low back pain, unspecified whether sciatica present  M54 5        Start Time: 1549  Stop Time: 1629  Total time in clinic (min): 40 minutes    Subjective: The patient presents for the first follow-up appointment, reports that symptoms worsened after his evaluation and that he was noncompliant some of the time with the initial HEP        Objective: See treatment diary below      Assessment: The patient tolerated manual and active treatment well today  The PT reviewed IHEP and introduced initial manual and ther-ex program during today's treatment  The patient demonstrated good response to today's treatment  Plan: Continue per plan of care        Precautions: PE, DVT (history), Asthma      Manuals 21            Lumbar PAIVM              Lumbar STM                                          Neuro Re-Ed              Sciatic nerve flossing              Multifidus press hooklying              Supine TA set  3 sec x20            Supine TA set / march  x20 ea            Bridging  Feet on PBall 3x10            Supine LTR  x15 ea side                          Seated scap retraction  2 sec 2x15            Seated cervical retraction  2 sec 2x15                                                      Ther Ex              Prone pressups On elbows   HEP             Supine HS curl w/ PBall  3x10                          BKTC                                          Seated UT stretching  5 sec x10 ea                          Ther Activity                                          Gait Training                                          Modalities                                          Assessment IE             Education Prognosis, HEP, POC

## 2021-09-23 ENCOUNTER — OFFICE VISIT (OUTPATIENT)
Dept: PHYSICAL THERAPY | Facility: CLINIC | Age: 66
End: 2021-09-23
Payer: COMMERCIAL

## 2021-09-23 DIAGNOSIS — M54.16 BILATERAL LUMBAR RADICULOPATHY: Primary | ICD-10-CM

## 2021-09-23 DIAGNOSIS — M54.50 ACUTE BILATERAL LOW BACK PAIN, UNSPECIFIED WHETHER SCIATICA PRESENT: ICD-10-CM

## 2021-09-23 PROCEDURE — 97110 THERAPEUTIC EXERCISES: CPT | Performed by: PHYSICAL THERAPIST

## 2021-09-23 PROCEDURE — 97112 NEUROMUSCULAR REEDUCATION: CPT | Performed by: PHYSICAL THERAPIST

## 2021-09-23 NOTE — PROGRESS NOTES
Daily Note     Today's date: 2021  Patient name: Valeriano Hall  : 1955  MRN: [id number]  Referring provider: Danial Severance, MD  Dx:   Encounter Diagnosis     ICD-10-CM    1  Bilateral lumbar radiculopathy  M54 16    2  Acute bilateral low back pain, unspecified whether sciatica present  M54 5        Start Time: 1045  Stop Time: 1130  Total time in clinic (min): 45 minutes    Subjective: No new complaints today  The patient reports improvement in symptoms since previous session  Objective: See treatment diary below      Assessment: The PT continued to introduce and advance the current program during today's treatment  Rows, extensions, and multifidus pressing were added to the patient's current program to promote increased resistive training  The patient tolerated manual and active treatment well today  The patient would benefit from further skilled PT services  Plan: Continue per plan of care        Precautions: PE, DVT (history), Asthma      Manuals 21           Lumbar PAIVM              Lumbar STM                                          Neuro Re-Ed              Sciatic nerve flossing              Multifidus press hooklying              Supine TA set  3 sec x20 3 sec x20           Supine TA set w/ march  x20 ea            Supine TA set w/ BKFO   x20 ea           Bridging  Feet on PBall 3x10 Feet on PBall 3x10           Supine LTR  x15 ea side                          Seated scap retraction  2 sec 2x15 2 sec x20           Seated cervical retraction  2 sec 2x15 2 sec x20                                                     Ther Ex              Active warmup   Recumb bike x6 min           Prone pressups On elbows   HEP             Supine HS curl w/ PBall  3x10                          BKTC              Rows/ext   BTB 2x15 ea, HEP           Multifidus press   BTB 2x15 ea way, HEP           Seated UT stretching  5 sec x10 ea                          Ther Activity Gait Training                                          Modalities                                          Assessment IE             Education Prognosis, HEP, POC  UHEP

## 2021-09-27 ENCOUNTER — APPOINTMENT (OUTPATIENT)
Dept: PHYSICAL THERAPY | Facility: CLINIC | Age: 66
End: 2021-09-27
Payer: COMMERCIAL

## 2021-09-30 ENCOUNTER — OFFICE VISIT (OUTPATIENT)
Dept: PHYSICAL THERAPY | Facility: CLINIC | Age: 66
End: 2021-09-30
Payer: COMMERCIAL

## 2021-09-30 DIAGNOSIS — M54.16 BILATERAL LUMBAR RADICULOPATHY: Primary | ICD-10-CM

## 2021-09-30 DIAGNOSIS — M54.50 ACUTE BILATERAL LOW BACK PAIN, UNSPECIFIED WHETHER SCIATICA PRESENT: ICD-10-CM

## 2021-09-30 PROCEDURE — 97110 THERAPEUTIC EXERCISES: CPT

## 2021-09-30 PROCEDURE — 97112 NEUROMUSCULAR REEDUCATION: CPT

## 2021-09-30 NOTE — PROGRESS NOTES
Daily Note     Today's date: 2021  Patient name: Elie Grover  : 1955  MRN: [id number]  Referring provider: Darcy Davies MD  Dx:   Encounter Diagnosis     ICD-10-CM    1  Bilateral lumbar radiculopathy  M54 16    2  Acute bilateral low back pain, unspecified whether sciatica present  M54 5        Start Time: 1047          Subjective:   Prior to start of session patient states that he has radicular sx in BLE  Patient feels sx are the same as last week  Objective: See treatment diary below      Assessment:   No increase in sx throughout session  Patient demonstrates good from with all TE & neuro activities  Radicular sx improved towards end of session  Patient would benefit from skilled PT  Plan: Continue per plan of care        Precautions: PE, DVT (history), Asthma      Manuals 21            Lumbar PAIVM              Lumbar STM                                          Neuro Re-Ed              Sciatic nerve flossing              Multifidus press hooklying              Supine TA set  3 sec x20 3 sec x20 3"x20          Supine TA set w/ march  x20 ea  NP          Supine TA set w/    x20 ea x20          Bridging  Feet on PBall 3x10 Feet on PBall 3x10 Feet on PBall 3x10          Supine LTR  x15 ea side  x10                        Seated scap retraction  2 sec 2x15 2 sec x20 2"x20          Seated cervical retraction  2 sec 2x15 2 sec x20 2"x20                                                    Ther Ex              Active warmup   Recumb bike x6 min Recumb bike x6 min          Prone pressups On elbows   HEP             Supine HS curl w/ PBall  3x10                          BKTC              Rows/ext   BTB 2x15 ea, HEP BTB  2x15 ea          Multifidus press   BTB 2x15 ea way, HEP BTB  2x15ea          Seated UT stretching  5 sec x10 ea                          Ther Activity                                          Gait Training Modalities                                          Assessment IE             Education Prognosis, HEP, POC  UHEP

## 2021-10-20 NOTE — PROGRESS NOTES
The patient has been absent from PT and will be discharged from formal care at this time  At the time of the patient's last PT session, he demonstrated improvement in decreased pain, increased joint mobility and increased activity tolerance  As of the patient's last PT session, the patient has not completely achieved the goals as stated in the established plan of care

## 2022-01-31 ENCOUNTER — HOSPITAL ENCOUNTER (EMERGENCY)
Facility: HOSPITAL | Age: 67
Discharge: HOME | End: 2022-01-31
Attending: EMERGENCY MEDICINE | Admitting: EMERGENCY MEDICINE
Payer: COMMERCIAL

## 2022-01-31 ENCOUNTER — APPOINTMENT (EMERGENCY)
Dept: ULTRASOUND IMAGING | Facility: HOSPITAL | Age: 67
End: 2022-01-31
Payer: COMMERCIAL

## 2022-01-31 VITALS
HEART RATE: 72 BPM | DIASTOLIC BLOOD PRESSURE: 84 MMHG | TEMPERATURE: 97.4 F | RESPIRATION RATE: 18 BRPM | SYSTOLIC BLOOD PRESSURE: 176 MMHG | OXYGEN SATURATION: 96 %

## 2022-01-31 DIAGNOSIS — M54.9 BACK PAIN: Primary | ICD-10-CM

## 2022-01-31 DIAGNOSIS — M54.16 LUMBAR RADICULAR PAIN: ICD-10-CM

## 2022-01-31 PROCEDURE — 99284 EMERGENCY DEPT VISIT MOD MDM: CPT | Performed by: PHYSICIAN ASSISTANT

## 2022-01-31 PROCEDURE — 99284 EMERGENCY DEPT VISIT MOD MDM: CPT

## 2022-01-31 PROCEDURE — 76870 US EXAM SCROTUM: CPT

## 2022-01-31 RX ORDER — OXYCODONE HYDROCHLORIDE 5 MG/1
5 TABLET ORAL ONCE
Status: COMPLETED | OUTPATIENT
Start: 2022-01-31 | End: 2022-01-31

## 2022-01-31 RX ORDER — OXYCODONE HYDROCHLORIDE 5 MG/1
5 TABLET ORAL EVERY 6 HOURS PRN
Qty: 12 TABLET | Refills: 0 | Status: SHIPPED | OUTPATIENT
Start: 2022-01-31 | End: 2022-02-10

## 2022-01-31 RX ORDER — CYCLOBENZAPRINE HCL 10 MG
10 TABLET ORAL ONCE
Status: COMPLETED | OUTPATIENT
Start: 2022-01-31 | End: 2022-01-31

## 2022-01-31 RX ORDER — PREDNISONE 20 MG/1
60 TABLET ORAL DAILY
Status: DISCONTINUED | OUTPATIENT
Start: 2022-01-31 | End: 2022-01-31 | Stop reason: HOSPADM

## 2022-01-31 RX ORDER — CYCLOBENZAPRINE HCL 5 MG
TABLET ORAL
Qty: 12 TABLET | Refills: 0 | Status: SHIPPED | OUTPATIENT
Start: 2022-01-31

## 2022-01-31 RX ORDER — PREDNISONE 20 MG/1
40 TABLET ORAL DAILY
Qty: 8 TABLET | Refills: 0 | Status: SHIPPED | OUTPATIENT
Start: 2022-01-31 | End: 2022-02-04

## 2022-01-31 RX ADMIN — PREDNISONE 60 MG: 20 TABLET ORAL at 12:21

## 2022-01-31 RX ADMIN — CYCLOBENZAPRINE HYDROCHLORIDE 10 MG: 10 TABLET, FILM COATED ORAL at 12:21

## 2022-01-31 RX ADMIN — OXYCODONE HYDROCHLORIDE 5 MG: 5 TABLET ORAL at 12:21

## 2022-01-31 NOTE — Clinical Note
Brook Duane was seen and treated in our emergency department on 1/31/2022  No restrictions            Diagnosis:     Anthony Dos Santos  may return to work on return date  He may return on this date: 02/02/2022    No lifting greater than 10lbs for one week     If you have any questions or concerns, please don't hesitate to call        Madai Alan PA-C    ______________________________           _______________          _______________  Hospital Representative                              Date                                Time

## 2022-01-31 NOTE — ED NOTES
Pt seen, assessed and discharged by physician  Pt ambulatory with steady gait  Alert and oriented x4  No complaints at this time  Please refer to physician assessment and plan of care       Cinda Hodgkins, RN  01/31/22 0144

## 2022-01-31 NOTE — ED PROVIDER NOTES
History  Chief Complaint   Patient presents with    Testicle Pain     Patient reports pain starting in right hip that radiates to into right testicle that "Feels like pulling and tugging with lots of pain"  Pain is intermittiant for a few months but worse since lifting heavy objects at work in last week  72-year-old male presents to the emergency department with complaints of lower back pain  States that he has had pain behind his right hip intermittently since September of 2021  Reports the pain often radiates around the hip and to the groin area  Additionally complains of some intermittent testicle pain  Patient states that all of his symptoms have been worse over the past several days since lifting something heavy at work  He denies any pain down the entire right leg  No weakness in lower extremity  States he was seen for this once previously and was started on a steroid pack and pain medications with some alleviation of symptoms  Did take 1 oxycodone that he had left over from previously, at 4:00 a m  With some improvement of symptoms  States he has not noticed any swelling in the right testicle  No injury to the area  History provided by:  Patient   used: No        Prior to Admission Medications   Prescriptions Last Dose Informant Patient Reported? Taking?    Ascorbic Acid (VITAMIN C PO)  Self Yes No   Sig: Take by mouth   CALCIUM ASCORBATE PO  Self Yes No   Sig: Take by mouth   Calcium Polycarbophil (FIBER-CAPS PO)  Self Yes No   Sig: Take by mouth   Omega-3 Fatty Acids (FISH OIL) 1,000 mg  Self Yes No   Sig: Take 1,000 mg by mouth daily   Probiotic Product (PROBIOTIC DAILY PO)  Self Yes No   Sig: Take by mouth   Turmeric (QC TUMERIC COMPLEX PO)  Self Yes No   Sig: Take by mouth   VITAMIN D PO  Self Yes No   Sig: Take by mouth   albuterol (VENTOLIN HFA) 90 mcg/act inhaler  Self No No   Sig: Inhale 2 puffs every 6 (six) hours as needed for wheezing   colestipol (COLESTID) 1 g tablet  Self Yes No   Sig: Take 1 g by mouth 2 (two) times a day   Patient not taking: Reported on 9/9/2021   cycloSPORINE (RESTASIS) 0 05 % ophthalmic emulsion  Self Yes No   Sig: Restasis 0 05 % eye drops in a dropperette   methocarbamol (ROBAXIN) 500 mg tablet   No No   Sig: Take 1 tablet (500 mg total) by mouth 2 (two) times a day   Patient not taking: Reported on 9/9/2021   methocarbamol (ROBAXIN) 500 mg tablet   No No   Sig: Take 1 tablet (500 mg total) by mouth 2 (two) times a day as needed for muscle spasms   methylPREDNISolone 4 MG tablet therapy pack   No No   Sig: Use as directed on package   multivitamin (THERAGRAN) TABS  Self Yes No   Sig: Take 1 tablet by mouth daily   oxyCODONE-acetaminophen (PERCOCET) 5-325 mg per tablet   No No   Sig: Take 1 tablet by mouth every 4 (four) hours as needed for moderate pain for up to 10 dosesMax Daily Amount: 6 tablets   rivaroxaban (XARELTO) 10 mg tablet  Self No No   Sig: Take 1 tablet (10 mg total) by mouth daily   rosuvastatin (CRESTOR) 10 MG tablet   Yes No   Sig: rosuvastatin 10 mg tablet   TAKE 1 TABLET BY MOUTH EVERY DAY AT BEDTIME   senna (Senna-Time) 8 6 MG tablet   Yes No   Sig: Daily   Patient not taking: Reported on 9/9/2021   sertraline (ZOLOFT) 25 mg tablet   Yes No   Sig: sertraline 25 mg tablet   take 1 tablet by mouth once daily   Patient not taking: Reported on 9/9/2021      Facility-Administered Medications: None       Past Medical History:   Diagnosis Date    Asthma     Colon polyp     CPAP (continuous positive airway pressure) dependence     DVT (deep venous thrombosis) (HCC)     LLE    Hyperlipidemia     Hypertension     Kidney stone     Pulmonary emboli (HCC)     Sleep apnea        Past Surgical History:   Procedure Laterality Date    COLONOSCOPY      HEMORRHOID SURGERY N/A 9/29/2020    Procedure: HEMORRHOIDECTOMY EXCISION examination under anesthesia;   Surgeon: Govind Murphy MD;  Location: [facility];  Service: [facility] [place] [facility]      SINUS SURGERY         Family History   Problem Relation Age of Onset    No Known Problems Mother     Colon cancer Father 68     I have reviewed and agree with the history as documented  E-Cigarette/Vaping    E-Cigarette Use Never User      E-Cigarette/Vaping Substances    Nicotine No     THC No     CBD No     Flavoring No     Other No     Unknown No      Social History     Tobacco Use    Smoking status: Former Smoker     Packs/day: 1 00     Years: 15 00     Pack years: 15 00     Types: Cigarettes, Cigars     Quit date:      Years since quittin 0    Smokeless tobacco: Never Used   Vaping Use    Vaping Use: Never used   Substance Use Topics    Alcohol use: Yes     Comment: rare    Drug use: Never       Review of Systems   Constitutional: Negative for activity change, appetite change, chills and fever  HENT: Negative for congestion, dental problem, drooling, ear discharge, ear pain, mouth sores, nosebleeds, rhinorrhea, sore throat and trouble swallowing  Eyes: Negative for pain, discharge and itching  Respiratory: Negative for cough, chest tightness, shortness of breath and wheezing  Cardiovascular: Negative for chest pain and palpitations  Gastrointestinal: Negative for abdominal pain, blood in stool, constipation, diarrhea, nausea and vomiting  Endocrine: Negative for cold intolerance and heat intolerance  Genitourinary: Positive for testicular pain  Negative for difficulty urinating, dysuria, flank pain, frequency, penile swelling, scrotal swelling and urgency  Musculoskeletal: Positive for back pain  Skin: Negative for rash and wound  Allergic/Immunologic: Negative for food allergies and immunocompromised state  Neurological: Negative for dizziness, seizures, syncope, weakness, numbness and headaches  Psychiatric/Behavioral: Negative for agitation, behavioral problems and confusion         Physical Exam  Physical Exam  Vitals and nursing note reviewed  Exam conducted with a chaperone present  Constitutional:       General: He is not in acute distress  Appearance: He is not diaphoretic  HENT:      Head: Normocephalic and atraumatic  Right Ear: External ear normal       Left Ear: External ear normal    Eyes:      Conjunctiva/sclera: Conjunctivae normal    Neck:      Vascular: No JVD  Trachea: No tracheal deviation  Cardiovascular:      Rate and Rhythm: Normal rate and regular rhythm  Heart sounds: Normal heart sounds  No murmur heard  No friction rub  No gallop  Pulmonary:      Effort: Pulmonary effort is normal  No respiratory distress  Breath sounds: Normal breath sounds  No wheezing or rales  Chest:      Chest wall: No tenderness  Abdominal:      General: Bowel sounds are normal  There is no distension  Palpations: Abdomen is soft  Tenderness: There is no abdominal tenderness  There is no guarding  Hernia: There is no hernia in the right inguinal area  Genitourinary:     Penis: Normal        Testes: Normal          Right: Tenderness or swelling not present  Left: Tenderness or swelling not present  Epididymis:      Right: Normal       Left: Normal    Musculoskeletal:         General: No tenderness or deformity  Lumbar back: Positive right straight leg raise test         Back:    Lymphadenopathy:      Cervical: No cervical adenopathy  Skin:     General: Skin is warm and dry  Findings: No erythema or rash  Neurological:      Mental Status: He is alert and oriented to person, place, and time     Psychiatric:         Mood and Affect: Mood normal          Behavior: Behavior normal          Vital Signs  ED Triage Vitals   Temperature Pulse Respirations Blood Pressure SpO2   01/31/22 1114 01/31/22 1114 01/31/22 1114 01/31/22 1114 01/31/22 1114   (!) 97 4 °F (36 3 °C) 70 18 (!) 182/82 99 %      Temp Source Heart Rate Source Patient Position - Orthostatic VS BP Location FiO2 (%)   01/31/22 1114 01/31/22 1114 01/31/22 1114 01/31/22 1114 --   Oral Monitor Sitting Left arm       Pain Score       01/31/22 1221       8           Vitals:    01/31/22 1114 01/31/22 1339   BP: (!) 182/82 (!) 176/84   Pulse: 70 72   Patient Position - Orthostatic VS: Sitting Sitting         Visual Acuity      ED Medications  Medications   predniSONE tablet 60 mg (60 mg Oral Given 1/31/22 1221)   cyclobenzaprine (FLEXERIL) tablet 10 mg (10 mg Oral Given 1/31/22 1221)   oxyCODONE (ROXICODONE) IR tablet 5 mg (5 mg Oral Given 1/31/22 1221)       Diagnostic Studies  Results Reviewed     None                 US scrotum and testicles   Final Result by Zeina Barrett MD (01/31 1345)       No acute scrotal pathology  Workstation performed: [id number]                    Procedures  Procedures         ED Course  ED Course as of 01/31/22 1603   Mon Jan 31, 2022   1259 Wife with additional concerns regarding testicular pain at time of discharge  Will proceed with US  SBIRT 22yo+      Most Recent Value   SBIRT (24 yo +)    In order to provide better care to our patients, we are screening all of our patients for alcohol and drug use  Would it be okay to ask you these screening questions? No Filed at: 01/31/2022 1314                    [facility]  Number of Diagnoses or Management Options  Back pain  Lumbar radicular pain  Diagnosis management comments: Differential diagnosis includes but not limited to:  Lumbar sprain, radicular pain    Testicular torsion or epididymitis less likely       Amount and/or Complexity of Data Reviewed  Tests in the radiology section of CPT®: ordered and reviewed        Disposition  Final diagnoses:   Back pain   Lumbar radicular pain     Time reflects when diagnosis was documented in both MDM as applicable and the Disposition within this note     Time User Action Codes Description Comment    1/31/2022 12:18 PM Marci Wagner Add [M54 9] Back pain     1/31/2022 12:18 PM Kody Fajardo Add [L19 85] Lumbar radicular pain       ED Disposition     ED Disposition Condition Date/Time Comment    Discharge  Mon Jan 31, 2022  2:11 PM Mich Aparicio discharge to home/self care            Follow-up Information     Follow up With Specialties Details Why [facility], MD Internal Medicine   [address]  [phone]            Discharge Medication List as of 1/31/2022 12:21 PM      START taking these medications    Details   cyclobenzaprine (FLEXERIL) 5 mg tablet 1-2 PO TID PRN, Normal      oxyCODONE (Roxicodone) 5 immediate release tablet Take 1 tablet (5 mg total) by mouth every 6 (six) hours as needed for moderate pain for up to 10 days Max Daily Amount: 20 mg, Starting Mon 1/31/2022, Until Thu 2/10/2022 at 2359, Normal      predniSONE 20 mg tablet Take 2 tablets (40 mg total) by mouth daily for 4 days, Starting Mon 1/31/2022, Until Fri 2/4/2022, Normal         CONTINUE these medications which have NOT CHANGED    Details   albuterol (VENTOLIN HFA) 90 mcg/act inhaler Inhale 2 puffs every 6 (six) hours as needed for wheezing, Starting Wed 1/29/2020, Normal      Ascorbic Acid (VITAMIN C PO) Take by mouth, Historical Med      CALCIUM ASCORBATE PO Take by mouth, Historical Med      Calcium Polycarbophil (FIBER-CAPS PO) Take by mouth, Historical Med      colestipol (COLESTID) 1 g tablet Take 1 g by mouth 2 (two) times a day, Historical Med      cycloSPORINE (RESTASIS) 0 05 % ophthalmic emulsion Restasis 0 05 % eye drops in a dropperette, Historical Med      !! methocarbamol (ROBAXIN) 500 mg tablet Take 1 tablet (500 mg total) by mouth 2 (two) times a day, Starting Mon 4/19/2021, Normal      !! methocarbamol (ROBAXIN) 500 mg tablet Take 1 tablet (500 mg total) by mouth 2 (two) times a day as needed for muscle spasms, Starting Thu 9/9/2021, Normal      methylPREDNISolone 4 MG tablet therapy pack Use as directed on package, Normal      multivitamin [facility]) TABS Take 1 tablet by mouth daily, Historical Med      Omega-3 Fatty Acids (FISH OIL) 1,000 mg Take 1,000 mg by mouth daily, Historical Med      oxyCODONE-acetaminophen (PERCOCET) 5-325 mg per tablet Take 1 tablet by mouth every 4 (four) hours as needed for moderate pain for up to 10 dosesMax Daily Amount: 6 tablets, Starting Thu 9/9/2021, Normal      Probiotic Product (PROBIOTIC DAILY PO) Take by mouth, Historical Med      rivaroxaban (XARELTO) 10 mg tablet Take 1 tablet (10 mg total) by mouth daily, Starting Mon 11/2/2020, Normal      rosuvastatin (CRESTOR) 10 MG tablet rosuvastatin 10 mg tablet   TAKE 1 TABLET BY MOUTH EVERY DAY AT BEDTIME, Historical Med      senna (Senna-Time) 8 6 MG tablet Daily, Starting Sat 2/27/2021, Historical Med      sertraline (ZOLOFT) 25 mg tablet sertraline 25 mg tablet   take 1 tablet by mouth once daily, Historical Med      Turmeric (QC TUMERIC COMPLEX PO) Take by mouth, Historical Med      VITAMIN D PO Take by mouth, Historical Med       !! - Potential duplicate medications found  Please discuss with provider                PDMP Review       Value Time User    PDMP Reviewed  Yes 9/9/2021  6:57 PM Jb Al DO ED Provider  Electronically Signed by           Harvey Shay PA-C  01/31/22 0084

## 2022-02-01 ENCOUNTER — NURSE TRIAGE (OUTPATIENT)
Dept: PHYSICAL THERAPY | Facility: OTHER | Age: 67
End: 2022-02-01

## 2022-02-01 ENCOUNTER — TELEPHONE (OUTPATIENT)
Dept: PHYSICAL THERAPY | Facility: OTHER | Age: 67
End: 2022-02-01

## 2022-02-01 DIAGNOSIS — M54.50 CHRONIC RIGHT-SIDED LOW BACK PAIN WITHOUT SCIATICA: Primary | ICD-10-CM

## 2022-02-01 DIAGNOSIS — G89.29 CHRONIC RIGHT-SIDED LOW BACK PAIN WITHOUT SCIATICA: Primary | ICD-10-CM

## 2022-02-01 NOTE — TELEPHONE ENCOUNTER
Additional Information   Negative: Is this related to a work injury?  Negative: Is this related to an MVA?  Negative: Are you currently recieving homecare services?  Negative: Has the patient had unexplained weight loss?  Negative: Does the patient have a fever?  Negative: Is the patient experiencing blood in sputum?  Negative: Is the patient experiencing urine retention?  Negative: Is the patient experiencing acute drop foot or paralysis?  Negative: Has the patient experienced major trauma? (fall from height, high speed collision, direct blow to spine) and is also experiencing nausea, light-headedness, or loss of consciousness?  Affirmative: Is this a chronic condition? Background - Initial Assessment  Clinical complaint: right lower back pain which radiates into the right hip and groin  No numbness or tingling  States the pain has been on and off "a few months now " Has done PT and chiropractor in the past   Date of onset: 9/2021  Frequency of pain: intermittent  Quality of pain: aching, sharp and stabbing    Protocols used: [facility] COMPREHENSIVE SPINE PROGRAM PROTOCOL    This RN did review in detail the Comprehensive Spine Program and what we can provide for their back pain  Patient is agreeable to being triaged by this RN and would like to proceed with Physical Therapy  Referral was placed for Physical Therapy at the [facility]  Patients information was sent to the  to make evaluation appointment  Patient made aware that the PT office  will be calling to schedule the appointment  Patient was provided with the phone number to the PT office  No further questions and/or concerns were voiced by the patient at this time  Patient states understanding of the referral that was placed  Referral Closed

## 2022-02-04 ENCOUNTER — EVALUATION (OUTPATIENT)
Dept: PHYSICAL THERAPY | Facility: CLINIC | Age: 67
End: 2022-02-04
Payer: COMMERCIAL

## 2022-02-04 VITALS — HEART RATE: 66 BPM | DIASTOLIC BLOOD PRESSURE: 84 MMHG | SYSTOLIC BLOOD PRESSURE: 140 MMHG

## 2022-02-04 DIAGNOSIS — G89.29 CHRONIC RIGHT-SIDED LOW BACK PAIN WITHOUT SCIATICA: ICD-10-CM

## 2022-02-04 DIAGNOSIS — M54.50 CHRONIC RIGHT-SIDED LOW BACK PAIN WITHOUT SCIATICA: ICD-10-CM

## 2022-02-04 PROCEDURE — 97112 NEUROMUSCULAR REEDUCATION: CPT | Performed by: PHYSICAL THERAPIST

## 2022-02-04 PROCEDURE — 97161 PT EVAL LOW COMPLEX 20 MIN: CPT | Performed by: PHYSICAL THERAPIST

## 2022-02-04 NOTE — PROGRESS NOTES
PT Evaluation     Today's date: 2022  Patient name: Ana Phelps  : 1955  MRN: [id number]  Referring provider: Kiera Londono PT  Dx:   Encounter Diagnosis     ICD-10-CM    1  Chronic right-sided low back pain without sciatica  M54 50 Ambulatory referral to PT spine    G89 [id number]        Start Time: 0850  Stop Time: 0930  Total time in clinic (min): 40 minutes         Assessment  Assessment details: Ana Phelps is a pleasant 61 y o  male who presents with low back pain, with radiating pain down the front of his right leg with tingling in the foot  The patient's greatest concerns are the pain he is experiencing and fear of not being able to keep active  No further referral appears necessary at this time based upon examination results  Primary movement impairment diagnosis of low back pain with radiating pain resulting in pathoanatomical symptoms of lumbar radiculopathy and limiting his ability to carry, exercise or recreation, go to work, lift, sleep and walk  Primary Impairments:  1) Decreased lumbar segmental mobility  2) Decreased femoroacetabular mobility  3) Increased neural sensitivity    Etiologic factors include recent heavy lifting  Symptom irritability: moderateUnderstanding of Dx/Px/POC: good   Prognosis: good  Prognosis details: Positive prognostic indicators include acute nature of the injury, positive attitude toward recovery and good understanding of diagnosis and treatment plan options  Negative prognostic indicators include hypertension, high symptom irritability and multiple pain sites  Goals  Patient will be independent with home exercise program    Patient will be able to manage symptoms independently       Short Term Goals: to be achieved by 4 weeks  1) Patient to be independent with basic HEP  2) Decrease pain to 4/10 at its worst  3) Increase lumbar spine AROM by 25% in all deficient planes   4) Increase LE strength by 1/2 MMT grade in all deficient planes    Long Term Goals: to be achieved by discharge  1) FOTO equal to or greater than 47  2) Patient to be independent with comprehensive HEP  3) Lumbar spine ROM WNL all planes to improve a/iadls  4) Increase LE strength to 5/5 MMT grade in all planes to improve a/iadls  5) Patient to report no sleep interruption secondary to pain  6) Increase tolerance for physical activities to >30 min  Plan  Patient would benefit from: skilled physical therapy  Planned modality interventions: Modalities PRN  Planned therapy interventions: activity modification, manual therapy, neuromuscular re-education, patient education, therapeutic activities, therapeutic exercise, graded activity, home exercise program, behavior modification and self care  Frequency: 2x week  Duration in weeks: 8  Treatment plan discussed with: patient        Subjective Evaluation    History of Present Illness  Mechanism of injury: History of Current Injury: (from IE 9/16/2021) The patient reports 2 weeks ago was lifitng a heavy object (>100lbs) at the end of that day had significant low back pain  The patient took tylenol and over the weekend got gradually worse, after progressing he went to the hospital Thursday and received a CT scan, noting herniated discs of L3-L5  He was prescibed narcotics, a steroid, and a muscle relaxer, all of which helped his symptoms  Patient reports today that since his medication has ended he feels his back pain is back  Current complaint (from IE 2/4/22): The patient presents today with a report of constant pain following his previous episode of physical therapy, reports that the combination of lifting heavy objects and returning to work has exacerbated his pain again  The patient reports that over the past 2-3 weeks his pain flared again prompting a visit to the ER, reports that they evaluated via ultrasound and provided medication   The ER referred patient to the comp spine program     Pain location/Descriptors: pain, stiffness, jabbing sensations which starts from the posterior/lateral back and radiates around to the anterior hip and front of his leg travelling down towards his foot  Aggravating factors: heavy lifting, bending, standing or walking long distance,   Easing factors: muscle relaxors, sitting/lying  24 HR pattern: progressively getting worse now that he finished his muscle relaxer  Imaging: CT scan, ultrasound  Special Questions: numbness, tingling in low back and down the front of the legs on the right side,   The patient reports that he does not care for a shot from pain management  Sleep: wakes up due to pain, inability to get comfortable  walking in the middle of the night helped     Occupation: assisted maintenance    Social Support  Steps to enter house: yes  Stairs in house: yes   Lives in: multiple-level home  Lives with: spouse    Patient Goals  Patient goals for therapy: decreased pain and return to work          Objective     Neurological Testing     Sensation     Lumbar   Left   Intact: light touch    Right   Intact: light touch    Reflexes   Left   Patellar (L4): normal (2+)    Right   Patellar (L4): normal (2+)  Achilles (S1): normal (2+)    Additional Neurological Details  (-) Nunn's    Active Range of Motion     Lumbar   Flexion:  with pain Restriction level: moderate  Extension:  Restriction level: minimal  Left lateral flexion:  Restriction level: minimal  Right lateral flexion:  with pain Restriction level: moderate  Left rotation:  Restriction level: minimal  Right rotation:  with pain Restriction level: minimal    Passive Range of Motion   Left Hip   Normal passive range of motion    Right Hip   Normal passive range of motion    Joint Play     Right Hip     Hypomobile in the posterior hip capsule, anterior hip capsule and long axis distraction    Hypomobile: T12, L1, L2, L3, L4, L5 and S1     Pain: T12, L1, L5 and S1   L1 comments: Oscillations provoked no improvement  Mechanical Assessment    Cervical      Thoracic      Lumbar    Standing flexion: sustained positions   Pain location:no change  Standing extension: repeated movements  Pain location: centralized    Strength/Myotome Testing     Lumbar   Left   Normal strength  Heel walk: normal  Toe walk: normal    Right   Heel walk: normal  Toe walk: normal    Left Hip   Planes of Motion   Flexion: 3+  Abduction: 4  Adduction: 4    Right Hip   Planes of Motion   Flexion: 3+  Abduction: 4+  Adduction: 4+    Left Knee   Flexion: 4  Extension: 4    Right Knee   Flexion: 4+  Extension: 4+    Left Ankle/Foot   Great toe extension: 5    Right Ankle/Foot   Dorsiflexion: 4+  Plantar flexion: 4+  Great toe flexion: 4+  Great toe extension: 4+    Tests     Lumbar     Left   Positive passive SLR  Negative crossed SLR  Right   Positive passive SLR  Negative crossed SLR  Right Hip   Positive MEL  Negative FADIR and scour       Additional Tests Details  Symptom relief with anterior -to-posterior              Precautions: PE, DVT (history), Asthma      Manuals 2/4/22            Lumbar PAIVM             Lumbar STM                                       Neuro Re-Ed             Sciatic nerve flossing             Multifidus press hooklying                          TA set HEP            TA set w/ marching HEP            TA set w/ BKFO HEP            Bridge HEP            Ther Ex             Active warmup             Prone pressups             SLR                          BKTC                                                                 Ther Activity                                       Gait Training                                       Modalities                                       Assessment IE            Education Prognosis, HEP, POC

## 2022-02-04 NOTE — LETTER
2022    [facility] Covarrubias    Patient: Donis Rodriguez   YOB: 1955   Date of Visit: 2022     Encounter Diagnosis     ICD-10-CM    1  Chronic right-sided low back pain without sciatica  M54 50 Ambulatory referral to PT spine    G89 29        Dear Dr Sophie Toribio: Thank you for your recent referral of Donis Rodriguez  Please review the attached evaluation summary from Augusto's recent visit  Please verify that you agree with the plan of care by signing the attached order  If you have any questions or concerns, please do not hesitate to call  I sincerely appreciate the opportunity to share in the care of one of your patients and hope to have another opportunity to work with you in the near future  Sincerely,    Geoffrey Hall, PT      Referring Provider:      I certify that I have read the below Plan of Care and certify the need for these services furnished under this plan of treatment while under my care  Pineda Inman MD  [address] [facility]  [address]  Via Fax: [phone]          PT Evaluation     Today's date: 2022  Patient name: Donis Rodriguez  : 1955  MRN: [id number]  Referring provider: Johnny Noguera PT  Dx:   Encounter Diagnosis     ICD-10-CM    1  Chronic right-sided low back pain without sciatica  M54 50 Ambulatory referral to PT spine    G89 29        Start Time: 0850  Stop Time: 930  Total time in clinic (min): 40 minutes         Assessment  Assessment details: Donis Rodriguez is a pleasant 61 y o  male who presents with low back pain, with radiating pain down the front of his right leg with tingling in the foot  The patient's greatest concerns are the pain he is experiencing and fear of not being able to keep active  No further referral appears necessary at this time based upon examination results      Primary movement impairment diagnosis of low back pain with radiating pain resulting in pathoanatomical symptoms of lumbar radiculopathy and limiting his ability to carry, exercise or recreation, go to work, lift, sleep and walk  Primary Impairments:  1) Decreased lumbar segmental mobility  2) Decreased femoroacetabular mobility  3) Increased neural sensitivity    Etiologic factors include recent heavy lifting  Symptom irritability: moderateUnderstanding of Dx/Px/POC: good   Prognosis: good  Prognosis details: Positive prognostic indicators include acute nature of the injury, positive attitude toward recovery and good understanding of diagnosis and treatment plan options  Negative prognostic indicators include hypertension, high symptom irritability and multiple pain sites  Goals  Patient will be independent with home exercise program    Patient will be able to manage symptoms independently  Short Term Goals: to be achieved by 4 weeks  1) Patient to be independent with basic HEP  2) Decrease pain to 4/10 at its worst  3) Increase lumbar spine AROM by 25% in all deficient planes   4) Increase LE strength by 1/2 MMT grade in all deficient planes    Long Term Goals: to be achieved by discharge  1) FOTO equal to or greater than 47  2) Patient to be independent with comprehensive HEP  3) Lumbar spine ROM WNL all planes to improve a/iadls  4) Increase LE strength to 5/5 MMT grade in all planes to improve a/iadls  5) Patient to report no sleep interruption secondary to pain  6) Increase tolerance for physical activities to >30 min  Plan  Patient would benefit from: skilled physical therapy  Planned modality interventions: Modalities PRN    Planned therapy interventions: activity modification, manual therapy, neuromuscular re-education, patient education, therapeutic activities, therapeutic exercise, graded activity, home exercise program, behavior modification and self care  Frequency: 2x week  Duration in weeks: 8  Treatment plan discussed with: patient        Subjective Evaluation    History of Present Illness  Mechanism of injury: History of Current Injury: (from IE 9/16/2021) The patient reports 2 weeks ago was lifitng a heavy object (>100lbs) at the end of that day had significant low back pain  The patient took tylenol and over the weekend got gradually worse, after progressing he went to the hospital Thursday and received a CT scan, noting herniated discs of L3-L5  He was prescibed narcotics, a steroid, and a muscle relaxer, all of which helped his symptoms  Patient reports today that since his medication has ended he feels his back pain is back  Current complaint (from IE 2/4/22): The patient presents today with a report of constant pain following his previous episode of physical therapy, reports that the combination of lifting heavy objects and returning to work has exacerbated his pain again  The patient reports that over the past 2-3 weeks his pain flared again prompting a visit to the ER, reports that they evaluated via ultrasound and provided medication  The ER referred patient to the comp spine program     Pain location/Descriptors: pain, stiffness, jabbing sensations which starts from the posterior/lateral back and radiates around to the anterior hip and front of his leg travelling down towards his foot  Aggravating factors: heavy lifting, bending, standing or walking long distance,   Easing factors: muscle relaxors, sitting/lying  24 HR pattern: progressively getting worse now that he finished his muscle relaxer  Imaging: CT scan, ultrasound  Special Questions: numbness, tingling in low back and down the front of the legs on the right side,   The patient reports that he does not care for a shot from pain management  Sleep: wakes up due to pain, inability to get comfortable  walking in the middle of the night helped     Occupation: long term maintenance    Social Support  Steps to enter house: yes  Stairs in house: yes   Lives in: multiple-level home  Lives with: spouse    Patient Goals  Patient goals for therapy: decreased pain and return to work          Objective     Neurological Testing     Sensation     Lumbar   Left   Intact: light touch    Right   Intact: light touch    Reflexes   Left   Patellar (L4): normal (2+)    Right   Patellar (L4): normal (2+)  Achilles (S1): normal (2+)    Additional Neurological Details  (-) Nunn's    Active Range of Motion     Lumbar   Flexion:  with pain Restriction level: moderate  Extension:  Restriction level: minimal  Left lateral flexion:  Restriction level: minimal  Right lateral flexion:  with pain Restriction level: moderate  Left rotation:  Restriction level: minimal  Right rotation:  with pain Restriction level: minimal    Passive Range of Motion   Left Hip   Normal passive range of motion    Right Hip   Normal passive range of motion    Joint Play     Right Hip     Hypomobile in the posterior hip capsule, anterior hip capsule and long axis distraction    Hypomobile: T12, L1, L2, L3, L4, L5 and S1     Pain: T12, L1, L5 and S1   L1 comments: Oscillations provoked no improvement  Mechanical Assessment    Cervical      Thoracic      Lumbar    Standing flexion: sustained positions   Pain location:no change  Standing extension: repeated movements  Pain location: centralized    Strength/Myotome Testing     Lumbar   Left   Normal strength  Heel walk: normal  Toe walk: normal    Right   Heel walk: normal  Toe walk: normal    Left Hip   Planes of Motion   Flexion: 3+  Abduction: 4  Adduction: 4    Right Hip   Planes of Motion   Flexion: 3+  Abduction: 4+  Adduction: 4+    Left Knee   Flexion: 4  Extension: 4    Right Knee   Flexion: 4+  Extension: 4+    Left Ankle/Foot   Great toe extension: 5    Right Ankle/Foot   Dorsiflexion: 4+  Plantar flexion: 4+  Great toe flexion: 4+  Great toe extension: 4+    Tests     Lumbar     Left   Positive passive SLR  Negative crossed SLR  Right   Positive passive SLR  Negative crossed SLR  Right Hip   Positive MEL  Negative FADIR and scour       Additional Tests Details  Symptom relief with anterior -to-posterior              Precautions: PE, DVT (history), Asthma      Manuals 2/4/22            Lumbar PAIVM             Lumbar STM                                       Neuro Re-Ed             Sciatic nerve flossing             Multifidus press hooklying                          TA set HEP            TA set w/ marching HEP            TA set w/ BKFO HEP            Bridge HEP            Ther Ex             Active warmup             Prone pressups             SLR                          BKTC                                                                 Ther Activity                                       Gait Training                                       Modalities                                       Assessment IE            Education Prognosis, HEP, POC

## 2022-02-09 ENCOUNTER — OFFICE VISIT (OUTPATIENT)
Dept: PHYSICAL THERAPY | Facility: CLINIC | Age: 67
End: 2022-02-09
Payer: COMMERCIAL

## 2022-02-09 DIAGNOSIS — M54.50 CHRONIC RIGHT-SIDED LOW BACK PAIN WITHOUT SCIATICA: Primary | ICD-10-CM

## 2022-02-09 DIAGNOSIS — G89.29 CHRONIC RIGHT-SIDED LOW BACK PAIN WITHOUT SCIATICA: Primary | ICD-10-CM

## 2022-02-09 PROCEDURE — 97110 THERAPEUTIC EXERCISES: CPT | Performed by: PHYSICAL THERAPIST

## 2022-02-09 PROCEDURE — 97140 MANUAL THERAPY 1/> REGIONS: CPT | Performed by: PHYSICAL THERAPIST

## 2022-02-09 PROCEDURE — 97112 NEUROMUSCULAR REEDUCATION: CPT | Performed by: PHYSICAL THERAPIST

## 2022-02-09 PROCEDURE — 97012 MECHANICAL TRACTION THERAPY: CPT | Performed by: PHYSICAL THERAPIST

## 2022-02-09 NOTE — PROGRESS NOTES
Daily Note     Today's date: 2022  Patient name: Akua Chaney  : 1955  MRN: [id number]  Referring provider: Felicita Jenkins, TONI  Dx:   Encounter Diagnosis     ICD-10-CM    1  Chronic right-sided low back pain without sciatica  M54 50     G89 29        Start Time: 0848  Stop Time: 930  Total time in clinic (min): 42 minutes    Subjective: The patient presents for the first follow-up appointment, reports that symptoms improved and that he was compliant most of the time with the initial HEP        Objective: See treatment diary below      Assessment: The patient tolerated manual and active treatment well today  The PT reviewed IHEP and introduced initial manual and ther-ex program during today's treatment  The patient demonstrated good response to today's treatment  Plan: Continue per plan of care        Precautions: PE, DVT (history), Asthma      Manuals 22 2           Lumbar PAIVM             Lumbar STM             Hip inf glide  SRB           Manual sciatic nerve floss  SRB           Piriformis stretching  SRB                                                  Neuro Re-Ed             Sciatic nerve flossing             Multifidus press   BTB 2x10 ea                        TA set HEP            TA set w/ marching HEP            TA set w/ BKFO HEP            Bridge HEP 2x15           Ther Ex             Active warmup  Recumb bike x8 min           Prone pressups             SLR  Prone 2x10 ea alt           Sidelying clamshell  x25 ea side           BKTC                                                                 Ther Activity                                       Gait Training                                       Modalities             Mechanical traction  Int 50#-20#, 30 sec-10 sec x10 min total                        Assessment IE            Education Prognosis, HEP, POC

## 2022-02-11 ENCOUNTER — OFFICE VISIT (OUTPATIENT)
Dept: PHYSICAL THERAPY | Facility: CLINIC | Age: 67
End: 2022-02-11
Payer: COMMERCIAL

## 2022-02-11 DIAGNOSIS — G89.29 CHRONIC RIGHT-SIDED LOW BACK PAIN WITHOUT SCIATICA: Primary | ICD-10-CM

## 2022-02-11 DIAGNOSIS — M54.50 CHRONIC RIGHT-SIDED LOW BACK PAIN WITHOUT SCIATICA: Primary | ICD-10-CM

## 2022-02-11 PROCEDURE — 97140 MANUAL THERAPY 1/> REGIONS: CPT | Performed by: PHYSICAL THERAPIST

## 2022-02-11 PROCEDURE — 97012 MECHANICAL TRACTION THERAPY: CPT | Performed by: PHYSICAL THERAPIST

## 2022-02-11 PROCEDURE — 97112 NEUROMUSCULAR REEDUCATION: CPT | Performed by: PHYSICAL THERAPIST

## 2022-02-11 NOTE — PROGRESS NOTES
Daily Note     Today's date: 2022  Patient name: Elenita Mack  : 1955  MRN: [id number]  Referring provider: Bishop Stacy, PT  Dx:   Encounter Diagnosis     ICD-10-CM    1  Chronic right-sided low back pain without sciatica  M54 50     G89 29        Start Time: 1045  Stop Time: 1130  Total time in clinic (min): 45 minutes    Subjective: No new complaints today  The patient reports improvement in symptoms since previous session  Objective: See treatment diary below      Assessment: The PT continued with the current program during today's session  Seated sciatic nerve gliding added to the patient's current program to promote neurodynamic health  The patient tolerated manual and active treatment well today  The patient would benefit from further skilled PT services  Plan: Continue per plan of care        Precautions: PE, DVT (history), Asthma      Manuals 22          Lumbar PAIVM             Lumbar STM             Hip inf glide  SRB SRB          Manual sciatic nerve floss  SRB SRB          Piriformis stretching  SRB SRB                                                 Neuro Re-Ed             Sciatic nerve flossing             Multifidus press   BTB 2x10 ea BTB 2x10 ea                       TA set HEP            TA set w/ marching HEP            TA set w/ BKFO HEP            Bridge HEP 2x15 2x15 on PBall          Ther Ex             Active warmup  Recumb bike x8 min Recumb bike x8 min          Prone pressups             SLR  Prone 2x10 ea alt Prone 2x10 ea alt          Sidelying clamshell  x25 ea side x25 ea side          BKTC                                                                 Ther Activity                                       Gait Training                                       Modalities             Mechanical traction  Int 50#-20#, 30 sec-10 sec x10 min total Int 60#-25#, 30 sec-10 sec x10 min total                       Assessment IE            Education Prognosis, HEP, POC

## 2022-02-14 ENCOUNTER — OFFICE VISIT (OUTPATIENT)
Dept: PHYSICAL THERAPY | Facility: CLINIC | Age: 67
End: 2022-02-14
Payer: COMMERCIAL

## 2022-02-14 DIAGNOSIS — M54.50 CHRONIC RIGHT-SIDED LOW BACK PAIN WITHOUT SCIATICA: Primary | ICD-10-CM

## 2022-02-14 DIAGNOSIS — G89.29 CHRONIC RIGHT-SIDED LOW BACK PAIN WITHOUT SCIATICA: Primary | ICD-10-CM

## 2022-02-14 PROCEDURE — 97112 NEUROMUSCULAR REEDUCATION: CPT | Performed by: PHYSICAL THERAPIST

## 2022-02-14 PROCEDURE — 97012 MECHANICAL TRACTION THERAPY: CPT | Performed by: PHYSICAL THERAPIST

## 2022-02-14 PROCEDURE — 97140 MANUAL THERAPY 1/> REGIONS: CPT | Performed by: PHYSICAL THERAPIST

## 2022-02-14 PROCEDURE — 97110 THERAPEUTIC EXERCISES: CPT | Performed by: PHYSICAL THERAPIST

## 2022-02-14 NOTE — PROGRESS NOTES
Daily Note     Today's date: 2022  Patient name: Edgard Whelan  : 1955  MRN: [id number]  Referring provider: Maurene Essex, PT  Dx:   Encounter Diagnosis     ICD-10-CM    1  Chronic right-sided low back pain without sciatica  M54 50     G89 29        Start Time: 0800  Stop Time: 0900  Total time in clinic (min): 60 minutes    Subjective: Pt reports he continues to have symptoms in the right low back, about the same since [facility]  Notes improvement in symptoms in the right LE noting decreased intensity and frequency of radicular symptoms  Reports difficulty with dressing the LE in the morning due to pain  Objective: See treatment diary below      Assessment: Tolerated treatment well  Noted some discomfort with exercises, decreased with rest  Decreased symptoms reported following manual techniques  Continues to report relief lasting approx 1 day after mechanical traction  Advised to continue with HEP  Progress as tolerated  Patient would benefit from continued PT      Plan: Continue per plan of care        Precautions: PE, DVT (history), Asthma      Manuals 22         Lumbar PAIVM             Lumbar STM             Hip inf glide  SRB SRB KCB         Manual sciatic nerve floss  SRB SRB KCB         Piriformis stretching  SRB SRB KCB                                                Neuro Re-Ed             Sciatic nerve flossing             Multifidus press   BTB 2x10 ea BTB 2x10 ea BTB 2x10 ea                      TA set HEP            TA set w/ marching HEP            TA set w/ BKFO HEP            Bridge HEP 2x15 2x15 on PBall 2x15 on PBall         Ther Ex             Active warmup  Recumb bike x8 min Recumb bike x8 min Recumb bike x8 min         Prone pressups             SLR  Prone 2x10 ea alt Prone 2x10 ea alt          Sidelying clamshell  x25 ea side x25 ea side x25 ea side         BKTC                                                                 Ther Activity Gait Training                                       Modalities             Mechanical traction  Int 50#-20#, 30 sec-10 sec x10 min total Int 60#-25#, 30 sec-10 sec x10 min total Int 60#-25#, 30 sec-10 sec x10 min total                      Assessment IE            Education Prognosis, HEP, POC

## 2022-02-17 ENCOUNTER — OFFICE VISIT (OUTPATIENT)
Dept: PHYSICAL THERAPY | Facility: CLINIC | Age: 67
End: 2022-02-17
Payer: COMMERCIAL

## 2022-02-17 ENCOUNTER — HOSPITAL ENCOUNTER (OUTPATIENT)
Dept: CT IMAGING | Facility: HOSPITAL | Age: 67
Discharge: HOME | End: 2022-02-17
Payer: COMMERCIAL

## 2022-02-17 DIAGNOSIS — G89.29 CHRONIC RIGHT-SIDED LOW BACK PAIN WITHOUT SCIATICA: Primary | ICD-10-CM

## 2022-02-17 DIAGNOSIS — N20.0 CALCULUS OF KIDNEY: ICD-10-CM

## 2022-02-17 DIAGNOSIS — M54.50 CHRONIC RIGHT-SIDED LOW BACK PAIN WITHOUT SCIATICA: Primary | ICD-10-CM

## 2022-02-17 PROCEDURE — 97112 NEUROMUSCULAR REEDUCATION: CPT | Performed by: PHYSICAL THERAPIST

## 2022-02-17 PROCEDURE — 74176 CT ABD & PELVIS W/O CONTRAST: CPT

## 2022-02-17 PROCEDURE — 97012 MECHANICAL TRACTION THERAPY: CPT | Performed by: PHYSICAL THERAPIST

## 2022-02-17 PROCEDURE — G1004 CDSM NDSC: HCPCS

## 2022-02-17 PROCEDURE — 97140 MANUAL THERAPY 1/> REGIONS: CPT | Performed by: PHYSICAL THERAPIST

## 2022-02-17 NOTE — PROGRESS NOTES
Daily Note     Today's date: 2022  Patient name: Robi Alonso  : 1955  MRN: [id number]  Referring provider: Sera Amato, PT  Dx:   Encounter Diagnosis     ICD-10-CM    1  Chronic right-sided low back pain without sciatica  M54 50     G89 29        Start Time: 928  Stop Time: 1015  Total time in clinic (min): 47 minutes    Subjective: No new complaints today  The patient reports improvement in symptoms since previous session  Objective: See treatment diary below      Assessment: The PT continued to progress care as tolerated during today's session  Active heel sliding with TA set was added to the patient's current program today  The patient tolerated manual and active treatment well today  The patient would benefit from further skilled PT services  Plan: Continue per plan of care        Precautions: PE, DVT (history), Asthma      Manuals 22        Lumbar PAIVM             Lumbar STM             Hip inf glide  SRB SRB KCB SRB        Manual sciatic nerve floss  SRB SRB KCB SRB        Piriformis stretching  SRB SRB KCB SRB                                               Neuro Re-Ed             Sciatic nerve flossing             Multifidus press   BTB 2x10 ea BTB 2x10 ea BTB 2x10 ea                      TA set HEP            TA set w/ marching HEP            TA set w/ BKFO HEP            TA set w/ heel slide     2x10 ea side        Hip abd/add iso     3 sec x25 ea        Supine active 90-90 sciatic glide     3x10, HEP        Bridge HEP 2x15 2x15 on PBall 2x15 on PBall         Ther Ex             Active warmup  Recumb bike x8 min Recumb bike x8 min Recumb bike x8 min NuStep x7 min, lvl 4        Prone pressups             SLR  Prone 2x10 ea alt Prone 2x10 ea alt          Sidelying clamshell  x25 ea side x25 ea side x25 ea side         BKTC             Sidelying hip abd     3x10, HEP                                               Ther Activity Gait Training                                       Modalities             Mechanical traction  Int 50#-20#, 30 sec-10 sec x10 min total Int 60#-25#, 30 sec-10 sec x10 min total Int 60#-25#, 30 sec-10 sec x10 min total Int 75#-30#, 30 sec-10 sec x10 min total                     Assessment IE            Education Prognosis, HEP, POC

## 2022-02-24 ENCOUNTER — OFFICE VISIT (OUTPATIENT)
Dept: PHYSICAL THERAPY | Facility: CLINIC | Age: 67
End: 2022-02-24
Payer: COMMERCIAL

## 2022-02-24 DIAGNOSIS — M54.50 CHRONIC RIGHT-SIDED LOW BACK PAIN WITHOUT SCIATICA: Primary | ICD-10-CM

## 2022-02-24 DIAGNOSIS — G89.29 CHRONIC RIGHT-SIDED LOW BACK PAIN WITHOUT SCIATICA: Primary | ICD-10-CM

## 2022-02-24 PROCEDURE — 97140 MANUAL THERAPY 1/> REGIONS: CPT | Performed by: PHYSICAL THERAPIST

## 2022-02-24 PROCEDURE — 97012 MECHANICAL TRACTION THERAPY: CPT | Performed by: PHYSICAL THERAPIST

## 2022-02-24 PROCEDURE — 97110 THERAPEUTIC EXERCISES: CPT | Performed by: PHYSICAL THERAPIST

## 2022-02-24 NOTE — PROGRESS NOTES
Daily Note     Today's date: 2022  Patient name: Pineda Samano  : 1955  MRN: [id number]  Referring provider: Christine Coned, PT  Dx:   Encounter Diagnosis     ICD-10-CM    1  Chronic right-sided low back pain without sciatica  M54 50     G89 29        Start Time: 0800  Stop Time: 0845  Total time in clinic (min): 45 minutes    Subjective: No new complaints today  The patient reports some change in symptoms since previous session  Objective: See treatment diary below      Assessment: The PT continued to progress his current program during today's session  Lumbar and hip mobility exercises added to the patient's current program to promote improved functional ability  The patient tolerated manual and active treatment well today  The patient would benefit from further skilled PT services  Plan: Continue per plan of care        Precautions: PE, DVT (history), Asthma      Manuals 22       Lumbar PAIVM             Lumbar STM             Hip inf glide  SRB SRB KCB SRB SRB       Manual sciatic nerve floss  SRB SRB KCB SRB SRB       Piriformis stretching  SRB SRB KCB SRB SRB                                              Neuro Re-Ed             Sciatic nerve flossing             Multifidus press   BTB 2x10 ea BTB 2x10 ea BTB 2x10 ea                      TA set HEP            TA set w/ marching HEP            TA set w/ BKFO HEP            TA set w/ heel slide     2x10 ea side 2x10 ea side       Hip abd/add iso     3 sec x25 ea 3 sec x25 ea       Supine active 90-90 sciatic glide     3x10, HEP        Bridge HEP 2x15 2x15 on PBall 2x15 on PBall         Ther Ex             Active warmup  Recumb bike x8 min Recumb bike x8 min Recumb bike x8 min NuStep x7 min, lvl 4 Recumb bike x8 min       Prone pressups             SLR  Prone 2x10 ea alt Prone 2x10 ea alt          Sidelying clamshell  x25 ea side x25 ea side x25 ea side         BKTC             Sidelying hip abd     3x10, HEP W/ arc over other foot 2x20 ea       Prone hip ext      x20 ea straight, x20 ea bent       Prone hip ER/IR      x25 ea                    Ther Activity                                       Gait Training                                       Modalities             Mechanical traction  Int 50#-20#, 30 sec-10 sec x10 min total Int 60#-25#, 30 sec-10 sec x10 min total Int 60#-25#, 30 sec-10 sec x10 min total Int 75#-30#, 30 sec-10 sec x10 min total Int 75#-30#, 30 sec-10 sec x10 min total                    Assessment IE            Education Prognosis, HEP, POC

## 2022-02-28 ENCOUNTER — OFFICE VISIT (OUTPATIENT)
Dept: PHYSICAL THERAPY | Facility: CLINIC | Age: 67
End: 2022-02-28
Payer: COMMERCIAL

## 2022-02-28 DIAGNOSIS — M54.50 CHRONIC RIGHT-SIDED LOW BACK PAIN WITHOUT SCIATICA: Primary | ICD-10-CM

## 2022-02-28 DIAGNOSIS — G89.29 CHRONIC RIGHT-SIDED LOW BACK PAIN WITHOUT SCIATICA: Primary | ICD-10-CM

## 2022-02-28 PROCEDURE — 97012 MECHANICAL TRACTION THERAPY: CPT | Performed by: PHYSICAL THERAPIST

## 2022-02-28 PROCEDURE — 97112 NEUROMUSCULAR REEDUCATION: CPT | Performed by: PHYSICAL THERAPIST

## 2022-02-28 PROCEDURE — 97140 MANUAL THERAPY 1/> REGIONS: CPT | Performed by: PHYSICAL THERAPIST

## 2022-02-28 NOTE — PROGRESS NOTES
Daily Note     Today's date: 2022  Patient name: Crystal David  : 1955  MRN: [id number]  Referring provider: Florencia Reardon, PT  Dx:   Encounter Diagnosis     ICD-10-CM    1  Chronic right-sided low back pain without sciatica  M54 50     G89 29        Start Time: 1115  Stop Time: 1200  Total time in clinic (min): 45 minutes    Subjective: No new complaints today  The patient reports worsening in symptoms since previous session, noting in particular an onset of pain into his leg to his ankle as well as pain in his neck  The patient notes that he has felt worse since he has been home from work all weekend resting and not being active  Objective: See treatment diary below      Assessment: The PT continued to emphasize neural dynamics during today's session  Ankle pumps added to supine sciatic nerve gliding  Cervical retraction in supine was also added to the patient's current program to promote decreased symptoms in this area  The patient tolerated manual and active treatment fair today  The patient would benefit from further skilled PT services  Plan: Continue per plan of care        Precautions: PE, DVT (history), Asthma      Manuals 22      Lumbar PAIVM             Lumbar STM             Hip inf glide  SRB SRB KCB SRB SRB SRB      Manual sciatic nerve floss  SRB SRB KCB SRB SRB SRB      Piriformis stretching  SRB SRB KCB SRB SRB                                              Neuro Re-Ed             Sciatic nerve flossing             Multifidus press   BTB 2x10 ea BTB 2x10 ea BTB 2x10 ea         Cervical retract       supine 3 sec x25, HEP      TA set HEP            TA set w/ marching HEP            TA set w/ BKFO HEP            TA set w/ heel slide     2x10 ea side 2x10 ea side x20 ea LE      Hip abd/add iso     3 sec x25 ea 3 sec x25 ea 3 sec x25 ea      Supine active 90-90 sciatic glide     3x10, HEP  Review, add ankle pump HEP      Bridge HEP 2x15 2x15 on PBall 2x15 on PBall   Single leg 2x10 on R      Ther Ex             Active warmup  Recumb bike x8 min Recumb bike x8 min Recumb bike x8 min NuStep x7 min, lvl 4 Recumb bike x8 min Recumb bike x8 min      Prone pressups             SLR  Prone 2x10 ea alt Prone 2x10 ea alt          Sidelying clamshell  x25 ea side x25 ea side x25 ea side         BKTC             Sidelying hip abd     3x10, HEP W/ arc over other foot 2x20 ea       Prone hip ext      x20 ea straight, x20 ea bent       Prone hip ER/IR      x25 ea                    Ther Activity                                       Gait Training                                       Modalities             Mechanical traction  Int 50#-20#, 30 sec-10 sec x10 min total Int 60#-25#, 30 sec-10 sec x10 min total Int 60#-25#, 30 sec-10 sec x10 min total Int 75#-30#, 30 sec-10 sec x10 min total Int 75#-30#, 30 sec-10 sec x10 min total Int 75#-35#, 30 sec-10 sec x10 min total                   Assessment IE            Education Prognosis, HEP, POC

## 2022-03-02 ENCOUNTER — APPOINTMENT (OUTPATIENT)
Dept: PHYSICAL THERAPY | Facility: CLINIC | Age: 67
End: 2022-03-02
Payer: COMMERCIAL

## 2022-03-03 ENCOUNTER — OFFICE VISIT (OUTPATIENT)
Dept: PHYSICAL THERAPY | Facility: CLINIC | Age: 67
End: 2022-03-03
Payer: COMMERCIAL

## 2022-03-03 DIAGNOSIS — G89.29 CHRONIC RIGHT-SIDED LOW BACK PAIN WITHOUT SCIATICA: Primary | ICD-10-CM

## 2022-03-03 DIAGNOSIS — M54.50 CHRONIC RIGHT-SIDED LOW BACK PAIN WITHOUT SCIATICA: Primary | ICD-10-CM

## 2022-03-03 PROCEDURE — 97140 MANUAL THERAPY 1/> REGIONS: CPT | Performed by: PHYSICAL THERAPIST

## 2022-03-03 PROCEDURE — 97012 MECHANICAL TRACTION THERAPY: CPT | Performed by: PHYSICAL THERAPIST

## 2022-03-03 PROCEDURE — 97112 NEUROMUSCULAR REEDUCATION: CPT | Performed by: PHYSICAL THERAPIST

## 2022-03-03 NOTE — PROGRESS NOTES
Daily Note     Today's date: 3/3/2022  Patient name: Luis Christine  : 1955  MRN: [id number]  Referring provider: Peter Linda, PT  Dx:   Encounter Diagnosis     ICD-10-CM    1  Chronic right-sided low back pain without sciatica  M54 50     G89 29        Start Time: 1110  Stop Time: 1200  Total time in clinic (min): 50 minutes    Subjective: No new complaints today  The patient reports improvement in symptoms since previous session  Objective: See treatment diary below      Assessment: The PT continued to focus on core control and general spinal mobility during today's session  LTR, bridging, and dead bugs added to the patient's current program to promote further core control  The patient tolerated manual and active treatment well today  The patient would benefit from further skilled PT services  Plan: Continue per plan of care        Precautions: PE, DVT (history), Asthma      Manuals 22 2/9 2/11 2/14 2/17 2/24 2/28 3/3     Lumbar PAIVM             Lumbar STM             Hip inf glide  SRB SRB KCB SRB SRB SRB SRB     Hip post glide        SRB     Manual sciatic nerve floss  SRB SRB KCB SRB SRB SRB SRB     Piriformis stretching  SRB SRB KCB SRB SRB                                              Neuro Re-Ed             Sciatic nerve flossing             Multifidus press   BTB 2x10 ea BTB 2x10 ea BTB 2x10 ea         Cervical retract       supine 3 sec x25, HEP      TA set HEP            TA set w/ marching HEP            TA set w/ BKFO HEP            TA set w/ heel slide     2x10 ea side 2x10 ea side x20 ea LE x20 ea LE     Hip abd/add iso     3 sec x25 ea 3 sec x25 ea 3 sec x25 ea      Supine active 90-90 sciatic glide     3x10, HEP  Review, add ankle pump HEP 5x5     Bridge HEP 2x15 2x15 on PBall 2x15 on PBall   Single leg 2x10 on R W/ feet on PBall     Supine LTR        Feet on PBall x10 ea side     Deadbug        W/ PBall, iso hold 3x10                                            Ther Ex Active warmup  Recumb bike x8 min Recumb bike x8 min Recumb bike x8 min NuStep x7 min, lvl 4 Recumb bike x8 min Recumb bike x8 min Recumb bike x8 min     Prone pressups             SLR  Prone 2x10 ea alt Prone 2x10 ea alt          Sidelying clamshell  x25 ea side x25 ea side x25 ea side         BKTC             Sidelying hip abd     3x10, HEP W/ arc over other foot 2x20 ea  W/ front and back taps 2x15     Prone hip ext      x20 ea straight, x20 ea bent  x20 ea straight, x20 ea bent     Prone hip ER/IR      x25 ea                    Ther Activity                                       Gait Training                                       Modalities             Mechanical traction  Int 50#-20#, 30 sec-10 sec x10 min total Int 60#-25#, 30 sec-10 sec x10 min total Int 60#-25#, 30 sec-10 sec x10 min total Int 75#-30#, 30 sec-10 sec x10 min total Int 75#-30#, 30 sec-10 sec x10 min total Int 75#-35#, 30 sec-10 sec x10 min total Int 80#-35#, 30 sec-10 sec x15 min total                  Assessment IE            Education Prognosis, HEP, POC

## 2022-03-10 ENCOUNTER — EVALUATION (OUTPATIENT)
Dept: PHYSICAL THERAPY | Facility: CLINIC | Age: 67
End: 2022-03-10
Payer: COMMERCIAL

## 2022-03-10 DIAGNOSIS — G89.29 CHRONIC RIGHT-SIDED LOW BACK PAIN WITHOUT SCIATICA: Primary | ICD-10-CM

## 2022-03-10 DIAGNOSIS — M54.50 CHRONIC RIGHT-SIDED LOW BACK PAIN WITHOUT SCIATICA: Primary | ICD-10-CM

## 2022-03-10 PROCEDURE — 97012 MECHANICAL TRACTION THERAPY: CPT | Performed by: PHYSICAL THERAPIST

## 2022-03-10 PROCEDURE — 97140 MANUAL THERAPY 1/> REGIONS: CPT | Performed by: PHYSICAL THERAPIST

## 2022-03-10 PROCEDURE — 97112 NEUROMUSCULAR REEDUCATION: CPT | Performed by: PHYSICAL THERAPIST

## 2022-03-10 PROCEDURE — 97110 THERAPEUTIC EXERCISES: CPT | Performed by: PHYSICAL THERAPIST

## 2022-03-10 NOTE — PROGRESS NOTES
PT Re-Evaluation     Today's date: 3/10/2022  Patient name: Orlando Tavera  : 1955  MRN: [id number]  Referring provider: Philip King PT  Dx:   Encounter Diagnosis     ICD-10-CM    1  Chronic right-sided low back pain without sciatica  M54 50     G89 29        Start Time: 0802  Stop Time: 0845  Total time in clinic (min): 43 minutes    Subjective: The patient presents for re-evaluation today  The patient reports improvement in symptoms since beginning skilled physical therapy  The patient reports 7/10 pain at it's worst over the past 24 hours (breif moment of 3-4 seconds per report), and reports 50% improvement in overall condition since beginning formal PT care  The patient's chief remaining concern is reducing the frequency of the patient's pains  Objective: See treatment diary below    Neurological Testing     Sensation     Lumbar   Left   Intact: light touch    Right   Intact: light touch    Reflexes   Left   Patellar (L4): normal (2+)    Right   Patellar (L4): normal (2+)  Achilles (S1): normal (2+)    Additional Neurological Details  (-) Nunn's    Active Range of Motion     Lumbar   Flexion: 75%  Extension:  90%  Left lateral flexion:   WNL  Right lateral flexion:  WNL  Left rotation:  75%  Right rotation:  75%    Passive Range of Motion   Left Hip   Normal passive range of motion    Right Hip   Normal passive range of motion    Joint Play     Right Hip     Hypomobile in the posterior hip capsule, anterior hip capsule and long axis distraction    Hypomobile: T12, L1, L2, L3, L4, L5 and S1     Pain: T12, L1, L5 and S1   Mechanical Assessment    Cervical      Thoracic      Lumbar    Standing flexion: sustained positions   Pain location:no change  Standing extension: repeated movements  Pain location: centralized    Strength/Myotome Testing     Lumbar   Left   Normal strength  Heel walk: normal  Toe walk: normal    Right   Heel walk: normal  Toe walk: normal    Left Hip   Planes of Motion Flexion: 4+  Abduction: 5  Adduction: 5    Right Hip   Planes of Motion   Flexion: 4+  Abduction: 5  Adduction: 5    Left Knee   Flexion: 5  Extension: 4+    Right Knee   Flexion: 5  Extension: 4+    Left Ankle/Foot   Great toe extension: 5    Right Ankle/Foot   Dorsiflexion: 5  Plantar flexion: 5  Great toe flexion: 5  Great toe extension: 5    Tests     Lumbar     Left   Positive passive SLR  Negative crossed SLR  Right   Positive passive SLR  Negative crossed SLR  Right Hip   Negative MEL  Negative FADIR and scour  Additional Tests Details  Symptom relief with anterior -to-posterior     Assessment: Diamante Humphries is a pleasant 61 y o  male who has been receiving PT intervention for low back pain with radiating symptoms  The patient has demonstrated decreased pain, increased strength, increased ROM, increased joint mobility, improved posture  and increased activity tolerance since beginning treatment  Primary remaining impairments include:    1)  Remaining lower leg strength concerns    2)  Remaining neural system sensitivity      Goals  Patient will be independent with home exercise program    Patient will be able to manage symptoms independently       Short Term Goals: to be achieved by 4 weeks  1) Patient to be independent with basic HEP (MET)  2) Decrease pain to 4/10 at its worst (NOT MET)  3) Increase lumbar spine AROM by 25% in all deficient planes  (MET)  4) Increase LE strength by 1/2 MMT grade in all deficient planes (MET)    Long Term Goals: to be achieved by discharge  1) FOTO equal to or greater than 47 (MET)  2) Patient to be independent with comprehensive HEP (MET)  3) Lumbar spine ROM WNL all planes to improve a/iadls (NOT MET)  4) Increase LE strength to 5/5 MMT grade in all planes to improve a/iadls (NOT MET)  5) Patient to report no sleep interruption secondary to pain (PARTIALLY MET)  6) Increase tolerance for physical activities to >30 min  (MET)      Plan  Patient would benefit from: skilled physical therapy  Planned modality interventions: Modalities PRN    Planned therapy interventions: activity modification, manual therapy, neuromuscular re-education, patient education, therapeutic activities, therapeutic exercise, graded activity, home exercise program, behavior modification and self care  Frequency: 2x week  Duration in weeks: 8  Treatment plan discussed with: patient     Precautions: PE, DVT (history), Asthma      Manuals 2/4/22 2/9 2/11 2/14 2/17 2/24 2/28 3/3 3/10    Lumbar PAIVM             Lumbar STM             Hip inf glide  SRB SRB KCB SRB SRB SRB SRB     Hip post glide        SRB     Manual sciatic nerve floss  SRB SRB KCB SRB SRB SRB SRB     Piriformis stretching  SRB SRB KCB SRB SRB                                              Neuro Re-Ed             Sciatic nerve flossing             Multifidus press   BTB 2x10 ea BTB 2x10 ea BTB 2x10 ea         Cervical retract       supine 3 sec x25, HEP      TA set HEP            TA set w/ marching HEP            TA set w/ BKFO HEP            TA set w/ heel slide     2x10 ea side 2x10 ea side x20 ea LE x20 ea LE     Hip abd/add iso     3 sec x25 ea 3 sec x25 ea 3 sec x25 ea      Supine active 90-90 sciatic glide     3x10, HEP  Review, add ankle pump HEP 5x5     Bridge HEP 2x15 2x15 on PBall 2x15 on PBall   Single leg 2x10 on R W/ feet on PBall W/ feet on PBall 3x15    Supine LTR        Feet on PBall x10 ea side Feet on PBall x10 ea side    Deadbug        W/ PBall, iso hold 3x10 W/ PBall, iso hold 3 sec x25                                           Ther Ex             Active warmup  Recumb bike x8 min Recumb bike x8 min Recumb bike x8 min NuStep x7 min, lvl 4 Recumb bike x8 min Recumb bike x8 min Recumb bike x8 min Recumb bike x8 min    Prone pressups             SLR  Prone 2x10 ea alt Prone 2x10 ea alt          Sidelying clamshell  x25 ea side x25 ea side x25 ea side         BKTC             Sidelying hip abd     3x10, HEP W/ arc over other foot 2x20 ea  W/ front and back taps 2x15     Prone hip ext      x20 ea straight, x20 ea bent  x20 ea straight, x20 ea bent     Prone hip ER/IR      x25 ea                    Ther Activity                                       Gait Training                                       Modalities             Mechanical traction  Int 50#-20#, 30 sec-10 sec x10 min total Int 60#-25#, 30 sec-10 sec x10 min total Int 60#-25#, 30 sec-10 sec x10 min total Int 75#-30#, 30 sec-10 sec x10 min total Int 75#-30#, 30 sec-10 sec x10 min total Int 75#-35#, 30 sec-10 sec x10 min total Int 80#-35#, 30 sec-10 sec x15 min total Int 80#-35#, 30 sec-10 sec x15 min total                 Assessment IE        RE, FOTO    Education Prognosis, HEP, POC        Exam findings, Erven Pellet

## 2022-03-17 ENCOUNTER — OFFICE VISIT (OUTPATIENT)
Dept: PHYSICAL THERAPY | Facility: CLINIC | Age: 67
End: 2022-03-17
Payer: COMMERCIAL

## 2022-03-17 DIAGNOSIS — M54.50 CHRONIC RIGHT-SIDED LOW BACK PAIN WITHOUT SCIATICA: Primary | ICD-10-CM

## 2022-03-17 DIAGNOSIS — G89.29 CHRONIC RIGHT-SIDED LOW BACK PAIN WITHOUT SCIATICA: Primary | ICD-10-CM

## 2022-03-17 PROCEDURE — 97012 MECHANICAL TRACTION THERAPY: CPT | Performed by: PHYSICAL THERAPIST

## 2022-03-17 PROCEDURE — 97140 MANUAL THERAPY 1/> REGIONS: CPT | Performed by: PHYSICAL THERAPIST

## 2022-03-17 PROCEDURE — 97530 THERAPEUTIC ACTIVITIES: CPT | Performed by: PHYSICAL THERAPIST

## 2022-03-17 PROCEDURE — 97112 NEUROMUSCULAR REEDUCATION: CPT | Performed by: PHYSICAL THERAPIST

## 2022-03-17 NOTE — PROGRESS NOTES
Daily Note     Today's date: 3/17/2022  Patient name: Polo Torre  : 1955  MRN: [id number]  Referring provider: Fatmata Thomas PT  Dx:   Encounter Diagnosis     ICD-10-CM    1  Chronic right-sided low back pain without sciatica  M54 50     G89 29        Start Time: 1115  Stop Time: 1200  Total time in clinic (min): 45 minutes    Subjective: No new complaints today  The patient reports improvement in symptoms since previous session  Objective: See treatment diary below      Assessment: The PT continued to advance care as tolerated during today's session  Increased PREs added to the patient's current program to promote further core control and recruitment  The patient tolerated manual and active treatment well today  The patient would benefit from further skilled PT services  Plan: Continue per plan of care        Precautions: PE, DVT (history), Asthma      Manuals 2/4/22 2/9 2/11 2/14 2/17 2/24 2/28 3/3 3/10 3/17   Lumbar PAIVM             Lumbar STM             Hip inf glide  SRB SRB KCB SRB SRB SRB SRB  SRB   Hip post glide        SRB     Manual sciatic nerve floss  SRB SRB KCB SRB SRB SRB SRB  SRB   Piriformis stretching  SRB SRB KCB SRB SRB                                              Neuro Re-Ed             Sciatic nerve flossing             Multifidus press   BTB 2x10 ea BTB 2x10 ea BTB 2x10 ea      BlaTB x20 ea way, HEP   Cervical retract       supine 3 sec x25, HEP      TA set HEP            TA set w/ marching HEP            TA set w/ BKFO HEP            TA set w/ heel slide     2x10 ea side 2x10 ea side x20 ea LE x20 ea LE     Hip abd/add iso     3 sec x25 ea 3 sec x25 ea 3 sec x25 ea      Supine active 90-90 sciatic glide     3x10, HEP  Review, add ankle pump HEP 5x5     Bridge HEP 2x15 2x15 on PBall 2x15 on PBall   Single leg 2x10 on R W/ feet on PBall W/ feet on PBall 3x15 W/ feet on PBall 3x15   Supine LTR        Feet on PBall x10 ea side Feet on PBall x10 ea side Feet on PBall x10 ea side   Deadbug        W/ PBall, iso hold 3x10 W/ PBall, iso hold 3 sec x25 W/ PBall, iso hold 3 sec x25                                          Ther Ex             Active warmup  Recumb bike x8 min Recumb bike x8 min Recumb bike x8 min NuStep x7 min, lvl 4 Recumb bike x8 min Recumb bike x8 min Recumb bike x8 min Recumb bike x8 min Recumb bike x8 min   Prone pressups             SLR  Prone 2x10 ea alt Prone 2x10 ea alt          Sidelying clamshell  x25 ea side x25 ea side x25 ea side         BKTC             Sidelying hip abd     3x10, HEP W/ arc over other foot 2x20 ea  W/ front and back taps 2x15     Prone hip ext      x20 ea straight, x20 ea bent  x20 ea straight, x20 ea bent     Prone hip ER/IR      x25 ea                    Ther Activity             Squat          STS w/ 2x10# dbs, 2x10, HEP   Deadlift          RDL to table w/ 10# db, x25, HEP                                                                    Gait Training                                       Modalities             Mechanical traction  Int 50#-20#, 30 sec-10 sec x10 min total Int 60#-25#, 30 sec-10 sec x10 min total Int 60#-25#, 30 sec-10 sec x10 min total Int 75#-30#, 30 sec-10 sec x10 min total Int 75#-30#, 30 sec-10 sec x10 min total Int 75#-35#, 30 sec-10 sec x10 min total Int 80#-35#, 30 sec-10 sec x15 min total Int 80#-35#, 30 sec-10 sec x15 min total Int 80#-35#, 30 sec-10 sec x15 min total                Assessment IE        RE, FOTO    Education Prognosis, HEP, POC        Exam findings, Agnes Crump

## 2022-03-23 ENCOUNTER — APPOINTMENT (EMERGENCY)
Dept: CT IMAGING | Facility: HOSPITAL | Age: 67
End: 2022-03-23
Payer: COMMERCIAL

## 2022-03-23 ENCOUNTER — HOSPITAL ENCOUNTER (EMERGENCY)
Facility: HOSPITAL | Age: 67
Discharge: HOME | End: 2022-03-23
Attending: EMERGENCY MEDICINE
Payer: COMMERCIAL

## 2022-03-23 VITALS
OXYGEN SATURATION: 95 % | SYSTOLIC BLOOD PRESSURE: 159 MMHG | RESPIRATION RATE: 16 BRPM | DIASTOLIC BLOOD PRESSURE: 85 MMHG | TEMPERATURE: 97.8 F | HEART RATE: 62 BPM

## 2022-03-23 DIAGNOSIS — R42 DIZZINESS: Primary | ICD-10-CM

## 2022-03-23 DIAGNOSIS — R42 VERTIGO: ICD-10-CM

## 2022-03-23 DIAGNOSIS — I10 HYPERTENSION, UNSPECIFIED TYPE: ICD-10-CM

## 2022-03-23 LAB
ALBUMIN SERPL BCP-MCNC: 3.5 G/DL (ref 3.5–5)
ALP SERPL-CCNC: 63 U/L (ref 46–116)
ALT SERPL W P-5'-P-CCNC: 35 U/L (ref 12–78)
ANION GAP SERPL CALCULATED.3IONS-SCNC: 6 MMOL/L (ref 4–13)
AST SERPL W P-5'-P-CCNC: 19 U/L (ref 5–45)
ATRIAL RATE: 58 BPM
BASOPHILS # BLD AUTO: 0.03 THOUSANDS/ΜL (ref 0–0.1)
BASOPHILS NFR BLD AUTO: 1 % (ref 0–1)
BILIRUB SERPL-MCNC: 0.36 MG/DL (ref 0.2–1)
BUN SERPL-MCNC: 20 MG/DL (ref 5–25)
CALCIUM SERPL-MCNC: 9.2 MG/DL (ref 8.3–10.1)
CARDIAC TROPONIN I PNL SERPL HS: 2 NG/L
CHLORIDE SERPL-SCNC: 103 MMOL/L (ref 100–108)
CO2 SERPL-SCNC: 28 MMOL/L (ref 21–32)
CREAT SERPL-MCNC: 1.06 MG/DL (ref 0.6–1.3)
EOSINOPHIL # BLD AUTO: 0.09 THOUSAND/ΜL (ref 0–0.61)
EOSINOPHIL NFR BLD AUTO: 2 % (ref 0–6)
ERYTHROCYTE [DISTWIDTH] IN BLOOD BY AUTOMATED COUNT: 12.2 % (ref 11.6–15.1)
GFR SERPL CREATININE-BSD FRML MDRD: 73 ML/MIN/1.73SQ M
GLUCOSE SERPL-MCNC: 79 MG/DL (ref 65–140)
HCT VFR BLD AUTO: 44.8 % (ref 36.5–49.3)
HGB BLD-MCNC: 15.3 G/DL (ref 12–17)
IMM GRANULOCYTES # BLD AUTO: 0.02 THOUSAND/UL (ref 0–0.2)
IMM GRANULOCYTES NFR BLD AUTO: 0 % (ref 0–2)
LYMPHOCYTES # BLD AUTO: 1.51 THOUSANDS/ΜL (ref 0.6–4.47)
LYMPHOCYTES NFR BLD AUTO: 28 % (ref 14–44)
MCH RBC QN AUTO: 30.7 PG (ref 26.8–34.3)
MCHC RBC AUTO-ENTMCNC: 34.2 G/DL (ref 31.4–37.4)
MCV RBC AUTO: 90 FL (ref 82–98)
MONOCYTES # BLD AUTO: 0.58 THOUSAND/ΜL (ref 0.17–1.22)
MONOCYTES NFR BLD AUTO: 11 % (ref 4–12)
NEUTROPHILS # BLD AUTO: 3.21 THOUSANDS/ΜL (ref 1.85–7.62)
NEUTS SEG NFR BLD AUTO: 58 % (ref 43–75)
NRBC BLD AUTO-RTO: 0 /100 WBCS
P AXIS: 24 DEGREES
PLATELET # BLD AUTO: 189 THOUSANDS/UL (ref 149–390)
PMV BLD AUTO: 10.6 FL (ref 8.9–12.7)
POTASSIUM SERPL-SCNC: 4.5 MMOL/L (ref 3.5–5.3)
PR INTERVAL: 208 MS
PROT SERPL-MCNC: 7.2 G/DL (ref 6.4–8.2)
QRS AXIS: 16 DEGREES
QRSD INTERVAL: 88 MS
QT INTERVAL: 392 MS
QTC INTERVAL: 384 MS
RBC # BLD AUTO: 4.98 MILLION/UL (ref 3.88–5.62)
SODIUM SERPL-SCNC: 137 MMOL/L (ref 136–145)
T WAVE AXIS: 34 DEGREES
VENTRICULAR RATE: 58 BPM
WBC # BLD AUTO: 5.44 THOUSAND/UL (ref 4.31–10.16)

## 2022-03-23 PROCEDURE — 85025 COMPLETE CBC W/AUTO DIFF WBC: CPT | Performed by: PHYSICIAN ASSISTANT

## 2022-03-23 PROCEDURE — 80053 COMPREHEN METABOLIC PANEL: CPT | Performed by: PHYSICIAN ASSISTANT

## 2022-03-23 PROCEDURE — G1004 CDSM NDSC: HCPCS

## 2022-03-23 PROCEDURE — 36415 COLL VENOUS BLD VENIPUNCTURE: CPT | Performed by: PHYSICIAN ASSISTANT

## 2022-03-23 PROCEDURE — 70498 CT ANGIOGRAPHY NECK: CPT

## 2022-03-23 PROCEDURE — 99284 EMERGENCY DEPT VISIT MOD MDM: CPT

## 2022-03-23 PROCEDURE — 70496 CT ANGIOGRAPHY HEAD: CPT

## 2022-03-23 PROCEDURE — 93010 ELECTROCARDIOGRAM REPORT: CPT | Performed by: INTERNAL MEDICINE

## 2022-03-23 PROCEDURE — 84484 ASSAY OF TROPONIN QUANT: CPT | Performed by: PHYSICIAN ASSISTANT

## 2022-03-23 PROCEDURE — 99285 EMERGENCY DEPT VISIT HI MDM: CPT | Performed by: PHYSICIAN ASSISTANT

## 2022-03-23 PROCEDURE — 93005 ELECTROCARDIOGRAM TRACING: CPT

## 2022-03-23 RX ORDER — MECLIZINE HCL 12.5 MG/1
25 TABLET ORAL ONCE
Status: COMPLETED | OUTPATIENT
Start: 2022-03-23 | End: 2022-03-23

## 2022-03-23 RX ADMIN — IOHEXOL 85 ML: 350 INJECTION, SOLUTION INTRAVENOUS at 14:13

## 2022-03-23 RX ADMIN — MECLIZINE 25 MG: 12.5 TABLET ORAL at 11:24

## 2022-03-23 NOTE — DISCHARGE INSTRUCTIONS
Dizziness   WHAT YOU NEED TO KNOW:   Dizziness is a feeling of being off balance or unsteady  Common causes of dizziness are an inner ear fluid imbalance or a lack of oxygen in your blood  Dizziness may be acute (lasts 3 days or less) or chronic (lasts longer than 3 days)  You may have dizzy spells that last from seconds to a few hours  DISCHARGE INSTRUCTIONS:   Return to the emergency department if:   · You have a headache and a stiff neck  · You have shaking chills and a fever  · You vomit over and over with no relief  · Your vomit or bowel movements are red or black  · You have pain in your chest, back, or abdomen  · You have numbness, especially in your face, arms, or legs  · You have trouble moving your arms or legs  · You are confused  Contact your healthcare provider if:   · You have a fever  · Your symptoms do not get better with treatment  · You have questions or concerns about your condition or care  Manage your symptoms:   · Do not drive  or operate heavy machinery when you are dizzy  · Get up slowly  from sitting or lying down  · Drink plenty of liquids  Liquids help prevent dehydration  Ask how much liquid to drink each day and which liquids are best for you  Follow up with your doctor as directed:  Write down your questions so you remember to ask them during your visits  © Copyright [employer] 2022 Information is for End User's use only and may not be sold, redistributed or otherwise used for commercial purposes  All illustrations and images included in CareNotes® are the copyrighted property of A D A M , Inc  or [facility] Nury Paige   The above information is an  only  It is not intended as medical advice for individual conditions or treatments  Talk to your doctor, nurse or pharmacist before following any medical regimen to see if it is safe and effective for you        Hypertension   WHAT YOU NEED TO KNOW:   Hypertension is high blood pressure  Your blood pressure is the force of your blood moving against the walls of your arteries  Hypertension causes your blood pressure to get so high that your heart has to work much harder than normal  This can damage your heart  The cause of hypertension may not be known  This is called essential or primary hypertension  Hypertension caused by another medical condition, such as kidney disease, is called secondary hypertension  DISCHARGE INSTRUCTIONS:   Call your local emergency number (911 in the [address] [facility]) or have someone call if:   · You have chest pain  · You have any of the following signs of a heart attack:      ? Squeezing, pressure, or pain in your chest    ? You may  also have any of the following:     § Discomfort or pain in your back, neck, jaw, stomach, or arm    § Shortness of breath    § Nausea or vomiting    § Lightheadedness or a sudden cold sweat    · You become confused or have trouble speaking  · You suddenly feel lightheaded or have trouble breathing  Return to the emergency department if:   · You have a severe headache or vision loss  · You have weakness in an arm or leg  Call your doctor or cardiologist if:   · You feel faint, dizzy, confused, or drowsy  · You have been taking your blood pressure medicine but your pressure is higher than your provider says it should be  · You have questions or concerns about your condition or care  Medicines: You may  need any of the following:  · Antihypertensives  may be used to help lower your blood pressure  Several kinds of medicines are available  Your healthcare provider will choose medicines based on the kind of hypertension you have  You may need more than one type of medicine  Take the medicine exactly as directed  · Diuretics  help decrease extra fluid that collects in your body  This will help lower your blood pressure  You may urinate more often while you take this medicine      · Cholesterol medicine  helps lower your cholesterol level  A low cholesterol level helps prevent heart disease and makes it easier to control your blood pressure  · Take your medicine as directed  Contact your healthcare provider if you think your medicine is not helping or if you have side effects  Tell him or her if you are allergic to any medicine  Keep a list of the medicines, vitamins, and herbs you take  Include the amounts, and when and why you take them  Bring the list or the pill bottles to follow-up visits  Carry your medicine list with you in case of an emergency  Follow up with your doctor or cardiologist as directed: You will need to return to have your blood pressure checked and to have other lab tests done  Write down your questions so you remember to ask them during your visits  Stages of hypertension:       · Normal blood pressure is 119/79 or lower   Your healthcare provider may only check your blood pressure each year if it stays at a normal level  · Elevated blood pressure is 120/79 to 129/79   This is sometimes called prehypertension  Your healthcare provider may suggest lifestyle changes to help lower your blood pressure to a normal level  He or she may then check it again in 3 to 6 months  · Stage 1 hypertension is 130/80  to 139/89   Your provider may recommend lifestyle changes, medication, and checks every 3 to 6 months until your blood pressure is controlled  · Stage 2 hypertension is 140/90 or higher   Your provider will recommend lifestyle changes and have you take 2 kinds of hypertension medicines  You will also need to have your blood pressure checked monthly until it is controlled  Manage hypertension:   · Check your blood pressure at home  Avoid smoking, caffeine, and exercise at least 30 minutes before checking your blood pressure  Sit and rest for 5 minutes before you take your blood pressure  Extend your arm and support it on a flat surface   Your arm should be at the same level as your heart  Follow the directions that came with your blood pressure monitor  Check your blood pressure 2 times, 1 minute apart, before you take your medicine in the morning  Also check your blood pressure before your evening meal  Keep a record of your readings and bring it to your follow-up visits  Ask your healthcare provider what your blood pressure should be  · Manage any other health conditions you have  Health conditions such as diabetes can increase your risk for hypertension  Follow your healthcare provider's instructions and take all your medicines as directed  · Ask about all medicines  Certain medicines can increase your blood pressure  Examples include oral birth control pills, decongestants, herbal supplements, and NSAIDs, such as ibuprofen  Your healthcare provider can tell you which medicines are safe for you to take  This includes prescription and over-the-counter medicines  Lifestyle changes you can make to manage hypertension:  Your healthcare provider may recommend you work with a team to manage hypertension  The team may include medical experts such as a dietitian, an exercise or physical therapist, and a behavior therapist  Your family members may be included in helping you create lifestyle changes  · Limit sodium (salt) as directed  Too much sodium can affect your fluid balance  Check labels to find low-sodium or no-salt-added foods  Some low-sodium foods use potassium salts for flavor  Too much potassium can also cause health problems  Your healthcare provider will tell you how much sodium and potassium are safe for you to have in a day  He or she may recommend that you limit sodium to 2,300 mg a day  · Follow the meal plan recommended by your healthcare provider  A dietitian or your provider can give you more information on low-sodium plans or the DASH (Dietary Approaches to Stop Hypertension) eating plan   The DASH plan is low in sodium, processed sugar, unhealthy fats, and total fat  It is high in potassium, calcium, and fiber  These can be found in vegetables, fruit, and whole-grain foods  · Be physically active throughout the day  Physical activity, such as exercise, can help control your blood pressure and your weight  Be physically active for at least 30 minutes per day, on most days of the week  Include aerobic activity, such as walking or riding a bicycle  Also include strength training at least 2 times each week  Your healthcare providers can help you create a physical activity plan  · Decrease stress  This may help lower your blood pressure  Learn ways to relax, such as deep breathing or listening to music  · Limit alcohol as directed  Alcohol can increase your blood pressure  A drink of alcohol is 12 ounces of beer, 5 ounces of wine, or 1½ ounces of liquor  · Do not smoke  Nicotine and other chemicals in cigarettes and cigars can increase your blood pressure and also cause lung damage  Ask your healthcare provider for information if you currently smoke and need help to quit  E-cigarettes or smokeless tobacco still contain nicotine  Talk to your healthcare provider before you use these products  © Copyright [employer] [facility] 2022 Information is for End User's use only and may not be sold, redistributed or otherwise used for commercial purposes  All illustrations and images included in CareNotes® are the copyrighted property of [employer]  or Karen Abel  The above information is an  only  It is not intended as medical advice for individual conditions or treatments  Talk to your doctor, nurse or pharmacist before following any medical regimen to see if it is safe and effective for you

## 2022-03-23 NOTE — Clinical Note
Steve Joseph was seen and treated in our emergency department on 3/23/2022  Diagnosis: Vertigo/Dizziness    Hubert Torres  is off the rest of the shift today, may return to work on return date  He may return on this date: 03/28/2022         If you have any questions or concerns, please don't hesitate to call        Fara Hollis PA-C    ______________________________           _______________          _______________  Hospital Representative                              Date                                Time

## 2022-03-23 NOTE — ED PROVIDER NOTES
History  Chief Complaint   Patient presents with    High Blood Pressure     Patient reports he went to urgent care on Monday for dizziness  Was found to have high blood pressure  Was told if he kept having high blood pressure to come to ED for evaluation  Pt c/o dizziness and headache currently  Edgard Whelan is a 61 y o  male with a PMHX of PE/DVT (On Xerelto), HTN and HLD who presents to the ED with complaints of dizziness, posterior head pressure, nausea/vomiting and elevated BP readings  Patient reports he first noticed symptoms on Sunday afternoon  Patient states he went to an urgent care on Monday and was given Meclizine with some improvement of symptoms  Patient states he still has pressure in the posterior head and "buzzing" in his head  Patient states he had episodes over the past few days where he felt like he was going to fall  Patient describes sensation of the room spinning and notices worsening nausea when turning his head to the left  Patient does describe bilateral blurry vision  Patient reports he went to a chiropractor yesterday and had his neck manipulated which improved his symptoms  Denies head injury, syncope, numbness, tingling, weakness, loss of vision, chest pain, SOB, palpitations, fever, chills  Patient is a former smoker  Patient reports blood pressure readings have been elevated as follows:  03/21 2100: 192/101  03/22 1300: 213/100  03/22 1630: 152/100  03/22 2200: 164/94  03/23 0935: 194/93  03/23 0955: 214/105  03/23 1030: 166/172    Patient reports he has never been on medications for HTN  Patient states he has had elevated BP readings in the past but nothing consistent         History provided by:  Patient  Dizziness  Quality:  Room spinning and imbalance  Duration:  4 days  Relieved by:  Closing eyes  Worsened by:  Turning head  Associated symptoms: headaches, nausea, vision changes (Blurred vision) and vomiting    Associated symptoms: no blood in stool, no chest pain, no diarrhea, no hearing loss, no palpitations, no shortness of breath, no syncope, no tinnitus and no weakness        Prior to Admission Medications   Prescriptions Last Dose Informant Patient Reported? Taking?    Ascorbic Acid (VITAMIN C PO)  Self Yes No   Sig: Take by mouth   CALCIUM ASCORBATE PO  Self Yes No   Sig: Take by mouth   Calcium Polycarbophil (FIBER-CAPS PO)  Self Yes No   Sig: Take by mouth   Omega-3 Fatty Acids (FISH OIL) 1,000 mg  Self Yes No   Sig: Take 1,000 mg by mouth daily   Probiotic Product (PROBIOTIC DAILY PO)  Self Yes No   Sig: Take by mouth   Turmeric (QC TUMERIC COMPLEX PO)  Self Yes No   Sig: Take by mouth   VITAMIN D PO  Self Yes No   Sig: Take by mouth   albuterol (VENTOLIN HFA) 90 mcg/act inhaler  Self No No   Sig: Inhale 2 puffs every 6 (six) hours as needed for wheezing   colestipol (COLESTID) 1 g tablet  Self Yes No   Sig: Take 1 g by mouth 2 (two) times a day   Patient not taking: Reported on 2021   cycloSPORINE (RESTASIS) 0 05 % ophthalmic emulsion  Self Yes No   Sig: Restasis 0 05 % eye drops in a dropperette   cyclobenzaprine (FLEXERIL) 5 mg tablet   No No   Si-2 PO TID PRN   methocarbamol (ROBAXIN) 500 mg tablet   No No   Sig: Take 1 tablet (500 mg total) by mouth 2 (two) times a day   Patient not taking: Reported on 2021   methocarbamol (ROBAXIN) 500 mg tablet   No No   Sig: Take 1 tablet (500 mg total) by mouth 2 (two) times a day as needed for muscle spasms   methylPREDNISolone 4 MG tablet therapy pack   No No   Sig: Use as directed on package   multivitamin (THERAGRAN) TABS  Self Yes No   Sig: Take 1 tablet by mouth daily   oxyCODONE-acetaminophen (PERCOCET) 5-325 mg per tablet   No No   Sig: Take 1 tablet by mouth every 4 (four) hours as needed for moderate pain for up to 10 dosesMax Daily Amount: 6 tablets   rivaroxaban (XARELTO) 10 mg tablet  Self No No   Sig: Take 1 tablet (10 mg total) by mouth daily   rosuvastatin (CRESTOR) 10 MG tablet   Yes No   Sig: rosuvastatin 10 mg tablet   TAKE 1 TABLET BY MOUTH EVERY DAY AT BEDTIME   senna (Senna-Time) 8 6 MG tablet   Yes No   Sig: Daily   Patient not taking: Reported on 2021   sertraline (ZOLOFT) 25 mg tablet   Yes No   Sig: sertraline 25 mg tablet   take 1 tablet by mouth once daily   Patient not taking: Reported on 2021      Facility-Administered Medications: None       Past Medical History:   Diagnosis Date    Asthma     Colon polyp     CPAP (continuous positive airway pressure) dependence     DVT (deep venous thrombosis) (HCC)     LLE    Hyperlipidemia     Hypertension     Kidney stone     Pulmonary emboli (HCC)     Sleep apnea        Past Surgical History:   Procedure Laterality Date    COLONOSCOPY      HEMORRHOID SURGERY N/A 2020    Procedure: HEMORRHOIDECTOMY EXCISION examination under anesthesia; Surgeon: Ruthie Copeland MD;  Location: [facility];  Service: General    ROTATOR CUFF REPAIR      SINUS SURGERY         Family History   Problem Relation Age of Onset    No Known Problems Mother     Colon cancer Father 68     I have reviewed and agree with the history as documented  E-Cigarette/Vaping    E-Cigarette Use Never User      E-Cigarette/Vaping Substances    Nicotine No     THC No     CBD No     Flavoring No     Other No     Unknown No      Social History     Tobacco Use    Smoking status: Former Smoker     Packs/day: 1 00     Years: 15 00     Pack years: 15 00     Types: Cigarettes, Cigars     Quit date:      Years since quittin 2    Smokeless tobacco: Never Used   Vaping Use    Vaping Use: Never used   Substance Use Topics    Alcohol use: Yes     Comment: rare    Drug use: Never       Review of Systems   Constitutional: Negative for appetite change, chills, fever and unexpected weight change  HENT: Negative for congestion, drooling, ear pain, hearing loss, rhinorrhea, sore throat, tinnitus, trouble swallowing and voice change      Eyes: Negative for pain, discharge, redness and visual disturbance  Respiratory: Negative for cough, shortness of breath, wheezing and stridor  Cardiovascular: Negative for chest pain, palpitations, leg swelling and syncope  Gastrointestinal: Positive for nausea and vomiting  Negative for abdominal pain, blood in stool, constipation and diarrhea  Genitourinary: Negative for dysuria, flank pain, frequency, hematuria and urgency  Musculoskeletal: Positive for neck pain  Negative for gait problem, joint swelling and neck stiffness  Skin: Negative for color change and rash  Neurological: Positive for dizziness and headaches  Negative for tremors, seizures, syncope, facial asymmetry, speech difficulty, weakness and light-headedness  Physical Exam  Physical Exam  Vitals and nursing note reviewed  Constitutional:       General: He is not in acute distress  Appearance: He is well-developed  He is not ill-appearing  HENT:      Head: Normocephalic and atraumatic  Nose: Nose normal    Eyes:      Conjunctiva/sclera: Conjunctivae normal       Pupils: Pupils are equal, round, and reactive to light  Cardiovascular:      Rate and Rhythm: Normal rate and regular rhythm  Pulmonary:      Effort: Pulmonary effort is normal       Breath sounds: Normal breath sounds  Abdominal:      General: Abdomen is flat  Bowel sounds are normal       Tenderness: There is no abdominal tenderness  Musculoskeletal:         General: Normal range of motion  Cervical back: Normal range of motion and neck supple  Skin:     General: Skin is warm and dry  Capillary Refill: Capillary refill takes less than 2 seconds  Neurological:      General: No focal deficit present  Mental Status: He is alert and oriented to person, place, and time  GCS: GCS eye subscore is 4  GCS verbal subscore is 5  GCS motor subscore is 6  Cranial Nerves: Cranial nerves are intact  Sensory: Sensation is intact        Motor: Motor function is intact  Coordination: Coordination is intact  Gait: Gait is intact  Comments: Alert and oriented to person, place, and time  PERRL and 3 mm symmetrical, EOMI with no evidence of nystagmus  Visual fields were intact to confrontation  Visual pursuits were smooth with normal saccadic eye movements  Facial sensation intact b/l with no evidence of facial asymmetry  Hearing was normal b/l and the tongue and palate were in midline  SCM and upper trapezius strength normal b/l  No evidence of postural or action tremor, No dysmetria seen on FTN testing            Vital Signs  ED Triage Vitals   Temperature Pulse Respirations Blood Pressure SpO2   03/23/22 1041 03/23/22 1040 03/23/22 1040 03/23/22 1040 03/23/22 1040   97 8 °F (36 6 °C) 70 20 166/78 97 %      Temp Source Heart Rate Source Patient Position - Orthostatic VS BP Location FiO2 (%)   03/23/22 1041 03/23/22 1040 -- -- --   Oral Monitor         Pain Score       --                  Vitals:    03/23/22 1040 03/23/22 1100 03/23/22 1516   BP: 166/78 161/84 159/85   Pulse: 70 64 62         Visual Acuity      ED Medications  Medications   meclizine (ANTIVERT) tablet 25 mg (25 mg Oral Given 3/23/22 1124)   iohexol (OMNIPAQUE) 350 MG/ML injection (SINGLE-DOSE) 85 mL (85 mL Intravenous Given 3/23/22 1413)       Diagnostic Studies  Results Reviewed     Procedure Component Value Units Date/Time    HS Troponin 0hr (reflex protocol) [[id number]]  (Normal) Collected: 03/23/22 1121    Lab Status: Final result Specimen: Blood from Arm, Right Updated: 03/23/22 1212     hs TnI 0hr 2 ng/L     Comprehensive metabolic panel [[id number]] Collected: 03/23/22 1121    Lab Status: Final result Specimen: Blood from Arm, Right Updated: 03/23/22 1202     Sodium 137 mmol/L      Potassium 4 5 mmol/L      Chloride 103 mmol/L      CO2 28 mmol/L      ANION GAP 6 mmol/L      BUN 20 mg/dL      Creatinine 1 06 mg/dL      Glucose 79 mg/dL      Calcium 9 2 mg/dL      AST 19 U/L ALT 35 U/L      Alkaline Phosphatase 63 U/L      Total Protein 7 2 g/dL      Albumin 3 5 g/dL      Total Bilirubin 0 36 mg/dL      eGFR 73 ml/min/1 73sq m     Narrative:      [place] guidelines for Chronic Kidney Disease (CKD):     Stage 1 with normal or high GFR (GFR > 90 mL/min/1 73 square meters)    Stage 2 Mild CKD (GFR = 60-89 mL/min/1 73 square meters)    Stage 3A Moderate CKD (GFR = 45-59 mL/min/1 73 square meters)    Stage 3B Moderate CKD (GFR = 30-44 mL/min/1 73 square meters)    Stage 4 Severe CKD (GFR = 15-29 mL/min/1 73 square meters)    Stage 5 End Stage CKD (GFR <15 mL/min/1 73 square meters)  Note: GFR calculation is accurate only with a steady state creatinine    CBC and differential [[id number]] Collected: 03/23/22 1121    Lab Status: Final result Specimen: Blood from Arm, Right Updated: 03/23/22 1145     WBC 5 44 Thousand/uL      RBC 4 98 Million/uL      Hemoglobin 15 3 g/dL      Hematocrit 44 8 %      MCV 90 fL      MCH 30 7 pg      MCHC 34 2 g/dL      RDW 12 2 %      MPV 10 6 fL      Platelets 572 Thousands/uL      nRBC 0 /100 WBCs      Neutrophils Relative 58 %      Immat GRANS % 0 %      Lymphocytes Relative 28 %      Monocytes Relative 11 %      Eosinophils Relative 2 %      Basophils Relative 1 %      Neutrophils Absolute 3 21 Thousands/µL      Immature Grans Absolute 0 02 Thousand/uL      Lymphocytes Absolute 1 51 Thousands/µL      Monocytes Absolute 0 58 Thousand/µL      Eosinophils Absolute 0 09 Thousand/µL      Basophils Absolute 0 03 Thousands/µL                  CTA head and neck with and without contrast   Final Result by Jose Jacobo MD (03/23 9170)      No acute intracranial pathology  Patent head and neck vessels  Developmental anomalies as above              Workstation performed: [id number]                    Procedures  ECG 12 Lead Documentation Only    Date/Time: 3/23/2022 12:06 PM  Performed by: Louann Cornelius PA-C  Authorized by: You Diss, DO     Indications / Diagnosis:  HTN, Dizziness  Patient location:  ED  Previous ECG:     Previous ECG:  Compared to current    Comparison ECG info:  04/19/21  Rate:     ECG rate:  58    ECG rate assessment: bradycardic    Rhythm:     Rhythm: sinus bradycardia    QRS:     QRS axis:  Normal  ST segments:     ST segments:  Normal  T waves:     T waves: flattening      Flattening:  III  Comments:      QT/QTc 392/384             ED Course  ED Course as of 03/23/22 1534   Wed Mar 23, 2022   1256 Patient informed CT and nursing staff that he would like to refuse CT at this time  Discussed with patient that without CT imaging I cannot rule out pathological causes of dizziness  Patient is agreeable to CT at this time  1514 Patient ambulated without difficulty  Patient is feeling improved  26 Educated patient regarding diagnosis and management  Advised patient to follow up with PCP  Advised patient to RTER for persistent or worsening symptoms  MDM  Number of Diagnoses or Management Options  Dizziness: new and requires workup  Hypertension, unspecified type: new and requires workup  Vertigo: new and requires workup  Diagnosis management comments: EKG and labs unremarkable  CTA Head/Neck without acute findings  Patient did have elevated BP readings in the emergency room  I did explain that this may be in the setting of acute vertigo versus undiagnosed HTN  Patient is going to follow up with PCP in 24-48 hours  Patient is feeling improved after meclizine and was able to ambulate without assistance  I provided patient with strict RTER precautions  I advised patient follow-up with PCP in 24-48 hours  Patient verbalized understanding          Amount and/or Complexity of Data Reviewed  Clinical lab tests: reviewed and ordered  Tests in the radiology section of CPT®: ordered and reviewed  Review and summarize past medical records: yes    Patient Progress  Patient progress: stable      Disposition  Final diagnoses:   Dizziness   Vertigo   Hypertension, unspecified type     Time reflects when diagnosis was documented in both MDM as applicable and the Disposition within this note     Time User Action Codes Description Comment    3/23/2022  3:04 PM Zula Earing Add [R42] Dizziness     3/23/2022  3:04 PM Zula Earing Add [R42] Vertigo     3/23/2022  3:04 PM Matilde Maldonado [address] Hypertension, unspecified type       ED Disposition     ED Disposition Condition Date/Time Comment    Discharge Stable Wed Mar 23, 2022  3:13 PM [address] discharge to home/self care              Follow-up Information     Follow up With Specialties Details Why Contact Info Additional [address] Emergency Department Emergency Medicine Go to  If symptoms worsen [address] Emergency Department, [address], [place], [address] [place] [address], MD Internal Medicine Schedule an appointment as soon as possible for a visit   [address]  [phone]             Discharge Medication List as of 3/23/2022  3:14 PM      CONTINUE these medications which have NOT CHANGED    Details   albuterol (VENTOLIN HFA) 90 mcg/act inhaler Inhale 2 puffs every 6 (six) hours as needed for wheezing, Starting Wed 1/29/2020, Normal      Ascorbic Acid (VITAMIN C PO) Take by mouth, Historical Med      CALCIUM ASCORBATE PO Take by mouth, Historical Med      Calcium Polycarbophil (FIBER-CAPS PO) Take by mouth, Historical Med      colestipol (COLESTID) 1 g tablet Take 1 g by mouth 2 (two) times a day, Historical Med      cyclobenzaprine (FLEXERIL) 5 mg tablet 1-2 PO TID PRN, Normal      cycloSPORINE (RESTASIS) 0 05 % ophthalmic emulsion Restasis 0 05 % eye drops in a dropperette, Historical Med      !! methocarbamol (ROBAXIN) 500 mg tablet Take 1 tablet (500 mg total) by mouth 2 (two) times a day, Starting Mon 4/19/2021, Normal      !! methocarbamol (ROBAXIN) 500 mg tablet Take 1 tablet (500 mg total) by mouth 2 (two) times a day as needed for muscle spasms, Starting Thu 9/9/2021, Normal      methylPREDNISolone 4 MG tablet therapy pack Use as directed on package, Normal      multivitamin (THERAGRAN) TABS Take 1 tablet by mouth daily, Historical Med      Omega-3 Fatty Acids (FISH OIL) 1,000 mg Take 1,000 mg by mouth daily, Historical Med      oxyCODONE-acetaminophen (PERCOCET) 5-325 mg per tablet Take 1 tablet by mouth every 4 (four) hours as needed for moderate pain for up to 10 dosesMax Daily Amount: 6 tablets, Starting Thu 9/9/2021, Normal      Probiotic Product (PROBIOTIC DAILY PO) Take by mouth, Historical Med      rivaroxaban (XARELTO) 10 mg tablet Take 1 tablet (10 mg total) by mouth daily, Starting Mon 11/2/2020, Normal      rosuvastatin (CRESTOR) 10 MG tablet rosuvastatin 10 mg tablet   TAKE 1 TABLET BY MOUTH EVERY DAY AT BEDTIME, Historical Med      senna (Senna-Time) 8 6 MG tablet Daily, Starting Sat 2/27/2021, Historical Med      sertraline (ZOLOFT) 25 mg tablet sertraline 25 mg tablet   take 1 tablet by mouth once daily, Historical Med      Turmeric (QC TUMERIC COMPLEX PO) Take by mouth, Historical Med      VITAMIN D PO Take by mouth, Historical Med       !! - Potential duplicate medications found  Please discuss with provider                PDMP Review       Value Time User    PDMP Reviewed  Yes 9/9/2021  6:57 PM Jeff Reyes DO          ED Provider  Electronically Signed by           Marina Walker PA-C  03/23/22 0178

## 2022-03-24 ENCOUNTER — OFFICE VISIT (OUTPATIENT)
Dept: PHYSICAL THERAPY | Facility: CLINIC | Age: 67
End: 2022-03-24
Payer: COMMERCIAL

## 2022-03-24 DIAGNOSIS — G89.29 CHRONIC RIGHT-SIDED LOW BACK PAIN WITHOUT SCIATICA: Primary | ICD-10-CM

## 2022-03-24 DIAGNOSIS — M54.50 CHRONIC RIGHT-SIDED LOW BACK PAIN WITHOUT SCIATICA: Primary | ICD-10-CM

## 2022-03-24 PROCEDURE — 97112 NEUROMUSCULAR REEDUCATION: CPT | Performed by: PHYSICAL THERAPIST

## 2022-03-24 PROCEDURE — 97140 MANUAL THERAPY 1/> REGIONS: CPT | Performed by: PHYSICAL THERAPIST

## 2022-03-24 NOTE — PROGRESS NOTES
Daily Note     Today's date: 3/24/2022  Patient name: Juancarlos Sidhu  : 1955  MRN: [id number]  Referring provider: Celestine Greene PT  Dx:   Encounter Diagnosis     ICD-10-CM    1  Chronic right-sided low back pain without sciatica  M54 50     G89 29        Start Time: 1117  Stop Time: 1148  Total time in clinic (min): 31 minutes    Subjective: The patient presents to his PT appointment today after seeing his PCP regarding a very recent onset of dizziness and HTN  The patient was seen in the ER yesterday for these symptoms, was referred to follow-up with his PCP and provided extensive education about safety and management at home upon discharge  The patient reports that he was also prescribed meclizine at an urgent care several days earlier and has noted some general improvement in symptoms but remains limited due to feelings of nausea, dizziness/vertigo/and general malaise  Objective: See treatment diary below    Resting BP  11:30 am, 155/95  11:45 am, 148/95    Resting HR  64 bpm    Assessment: Due to the patients recent episodes and medical workup, as well as repeated blood pressure readings in the clinic, the PT elected not to pursue any active treatment during today's session  The patient tolerated gentle manual intervention well from a supine position  The patient was educated by the PT on findings, concerns, and appropriate steps to take following both his ER session and his recent PCP appointment  The patient acknowledged his understanding  Plan: Continue per plan of care    Reassess vital signs at next visit to determine appropriateness for return to physical activities     Precautions: PE, DVT (history), Asthma      Manuals 3/24     2/24 2/28 3/3 3/10 3/17   Lumbar PAIVM             Lumbar STM             Hip inf glide      SRB SRB SRB  SRB   Hip post glide SRB       SRB     Manual sciatic nerve floss SRB     SRB SRB SRB  SRB   Piriformis stretching      SRB Neuro Re-Ed             Sciatic nerve flossing             Multifidus press           BlaTB x20 ea way, HEP   Cervical retract       supine 3 sec x25, HEP      TA set             TA set w/ marching             TA set w/ BKFO             TA set w/ heel slide      2x10 ea side x20 ea LE x20 ea LE     Hip abd/add iso      3 sec x25 ea 3 sec x25 ea      Supine active 90-90 sciatic glide       Review, add ankle pump HEP 5x5     Bridge       Single leg 2x10 on R W/ feet on PBall W/ feet on PBall 3x15 W/ feet on PBall 3x15   Supine LTR        Feet on PBall x10 ea side Feet on PBall x10 ea side Feet on PBall x10 ea side   Deadbug        W/ PBall, iso hold 3x10 W/ PBall, iso hold 3 sec x25 W/ PBall, iso hold 3 sec x25                                          Ther Ex             Active warmup      Recumb bike x8 min Recumb bike x8 min Recumb bike x8 min Recumb bike x8 min Recumb bike x8 min   Prone pressups             SLR             Sidelying clamshell             BKTC             Sidelying hip abd      W/ arc over other foot 2x20 ea  W/ front and back taps 2x15     Prone hip ext      x20 ea straight, x20 ea bent  x20 ea straight, x20 ea bent     Prone hip ER/IR      x25 ea                    Ther Activity             Squat          STS w/ 2x10# dbs, 2x10, HEP   Deadlift          RDL to table w/ 10# db, x25, HEP                                                                    Gait Training                                       Modalities             Mechanical traction      Int 75#-30#, 30 sec-10 sec x10 min total Int 75#-35#, 30 sec-10 sec x10 min total Int 80#-35#, 30 sec-10 sec x15 min total Int 80#-35#, 30 sec-10 sec x15 min total Int 80#-35#, 30 sec-10 sec x15 min total                Assessment Vital sign assessment        RE, [facility] management, patient safety, assessment findings        Exam findings, Toy Whitfield

## 2022-03-31 ENCOUNTER — OFFICE VISIT (OUTPATIENT)
Dept: PHYSICAL THERAPY | Facility: CLINIC | Age: 67
End: 2022-03-31
Payer: COMMERCIAL

## 2022-03-31 DIAGNOSIS — M54.50 CHRONIC RIGHT-SIDED LOW BACK PAIN WITHOUT SCIATICA: Primary | ICD-10-CM

## 2022-03-31 DIAGNOSIS — G89.29 CHRONIC RIGHT-SIDED LOW BACK PAIN WITHOUT SCIATICA: Primary | ICD-10-CM

## 2022-03-31 PROCEDURE — 97140 MANUAL THERAPY 1/> REGIONS: CPT | Performed by: PHYSICAL THERAPIST

## 2022-03-31 PROCEDURE — 97112 NEUROMUSCULAR REEDUCATION: CPT | Performed by: PHYSICAL THERAPIST

## 2022-03-31 NOTE — PROGRESS NOTES
Daily Note     Today's date: 3/31/2022  Patient name: Kathleen Huizar  : 1955  MRN: [id number]  Referring provider: Skylar Bonilla PT  Dx:   Encounter Diagnosis     ICD-10-CM    1  Chronic right-sided low back pain without sciatica  M54 50     G89 29        Start Time: 1113  Stop Time: 1152  Total time in clinic (min): 39 minutes    Subjective: No new complaints today  The patient reports improvement in symptoms since previous session  Objective: See treatment diary below      Assessment: The PT resumed core control training during today's session  No new interventions were added to the patient's current program to prevent over-training in light of recent challenges with the patient's blood pressure  The patient tolerated manual and active treatment well today  The patient would benefit from further skilled PT services  Plan: Continue per plan of care        Precautions: PE, DVT (history), Asthma      Manuals 3/24 3/31    2/24 2/28 3/3 3/10 3/17   Lumbar PAIVM             Lumbar STM             Hip inf glide  SRB    SRB SRB SRB  SRB   Hip post glide SRB SRB      SRB     Manual sciatic nerve floss SRB SRB    SRB SRB SRB  SRB   Piriformis stretching      SRB                                              Neuro Re-Ed             Sciatic nerve flossing             Multifidus press   BlaTB x20 ea way        BlaTB x20 ea way, HEP   Cervical retract       supine 3 sec x25, HEP      TA set             TA set w/ marching             TA set w/ BKFO             TA set w/ heel slide      2x10 ea side x20 ea LE x20 ea LE     Hip abd/add iso      3 sec x25 ea 3 sec x25 ea      Supine active 90-90 sciatic glide       Review, add ankle pump HEP 5x5     Bridge  W/ feet on PBall 3x15     Single leg 2x10 on R W/ feet on PBall W/ feet on PBall 3x15 W/ feet on PBall 3x15   Supine LTR  Feet on PBall x10 ea side      Feet on PBall x10 ea side Feet on PBall x10 ea side Feet on PBall x10 ea side   Deadbug  W/ PBall, iso hold 3 sec x25      W/ PBall, iso hold 3x10 W/ PBall, iso hold 3 sec x25 W/ PBall, iso hold 3 sec x25                                          Ther Ex             Active warmup  Recumb bike x6 min    Recumb bike x8 min Recumb bike x8 min Recumb bike x8 min Recumb bike x8 min Recumb bike x8 min   Prone pressups             SLR             Sidelying clamshell             BKTC             Sidelying hip abd      W/ arc over other foot 2x20 ea  W/ front and back taps 2x15     Prone hip ext      x20 ea straight, x20 ea bent  x20 ea straight, x20 ea bent     Prone hip ER/IR      x25 ea                    Ther Activity             Squat          STS w/ 2x10# dbs, 2x10, HEP   Deadlift  RDL to table w/ 10# db x25        RDL to table w/ 10# db, x25, HEP                                                                    Gait Training                                       Modalities             Mechanical traction      Int 75#-30#, 30 sec-10 sec x10 min total Int 75#-35#, 30 sec-10 sec x10 min total Int 80#-35#, 30 sec-10 sec x15 min total Int 80#-35#, 30 sec-10 sec x15 min total Int 80#-35#, 30 sec-10 sec x15 min total                Assessment Vital sign assessment        RE, [facility] management, patient safety, assessment findings        Exam findings, Nigel Trevizo

## 2022-04-07 ENCOUNTER — EVALUATION (OUTPATIENT)
Dept: PHYSICAL THERAPY | Facility: CLINIC | Age: 67
End: 2022-04-07
Payer: COMMERCIAL

## 2022-04-07 DIAGNOSIS — G89.29 CHRONIC RIGHT-SIDED LOW BACK PAIN WITHOUT SCIATICA: Primary | ICD-10-CM

## 2022-04-07 DIAGNOSIS — M54.50 CHRONIC RIGHT-SIDED LOW BACK PAIN WITHOUT SCIATICA: Primary | ICD-10-CM

## 2022-04-07 PROCEDURE — 97112 NEUROMUSCULAR REEDUCATION: CPT | Performed by: PHYSICAL THERAPIST

## 2022-04-07 PROCEDURE — 97140 MANUAL THERAPY 1/> REGIONS: CPT | Performed by: PHYSICAL THERAPIST

## 2022-04-07 NOTE — PROGRESS NOTES
PT Re-Evaluation     Today's date: 2022  Patient name: Andreina Mendez  : 1955  MRN: [id number]  Referring provider: Will Carrizales PT  Dx:   Encounter Diagnosis     ICD-10-CM    1  Chronic right-sided low back pain without sciatica  M54 50     G89 29        Start Time: 1120  Stop Time: 1200  Total time in clinic (min): 40 minutes    Subjective: The patient presents for re-evaluation today  The patient reports improvement in symptoms since beginning skilled physical therapy  The patient reports 3/10 pain at it's worst over the past 24 hours, and reports 75% improvement in overall condition since beginning formal PT care  The patient's chief remaining concern is the symptom he continues to experience first thing in the morning  Objective: See treatment diary below    Neurological Testing     Sensation     Lumbar   Left   Intact: light touch    Right   Intact: light touch    Reflexes   Left   Patellar (L4): normal (2+)    Right   Patellar (L4): normal (2+)  Achilles (S1): normal (2+)    Additional Neurological Details  (-) Nunn's    Active Range of Motion     Lumbar   Flexion: 90%  Extension:  WNL%  Left lateral flexion:   WNL  Right lateral flexion:  WNL  Left rotation:  90%  Right rotation:  90%    Passive Range of Motion   Left Hip   Normal passive range of motion    Right Hip   Normal passive range of motion    Joint Play     Right Hip     WNL: in the posterior hip capsule, anterior hip capsule and long axis distraction    WNL: T12, L1, L2, L3, L4, L5 and S1     Mechanical Assessment      Strength/Myotome Testing     Lumbar   Left   Normal strength  Heel walk: normal  Toe walk: normal    Right   Heel walk: normal  Toe walk: normal    Left Hip   Planes of Motion   Flexion: 5  Abduction: 5  Adduction: 5    Right Hip   Planes of Motion   Flexion: 5  Abduction: 5  Adduction: 5    Left Knee   Flexion: 5  Extension: 5    Right Knee   Flexion: 5  Extension: 5    Left Ankle/Foot   Great toe extension: 5    Right Ankle/Foot   Dorsiflexion: 5  Plantar flexion: 5  Great toe flexion: 5  Great toe extension: 5    Tests     Lumbar     Left   Negative passive SLR  Negative crossed SLR  Right   Negative passive SLR  Negative crossed SLR  Right Hip   Negative MEL  Negative FADIR and scour  Assessment: Caty Dunn is a pleasant 61 y o  male who has been receiving PT intervention for low back pain with radiating symptoms  The patient has demonstrated decreased pain, increased strength, increased ROM, increased joint mobility, improved posture  and increased activity tolerance since beginning treatment  Primary remaining impairments include:    1)  Remaining neural system sensitivity      Goals  Patient will be independent with home exercise program    Patient will be able to manage symptoms independently       Short Term Goals: to be achieved by 4 weeks  1) Patient to be independent with basic HEP (MET)  2) Decrease pain to 4/10 at its worst (MET)  3) Increase lumbar spine AROM by 25% in all deficient planes  (MET)  4) Increase LE strength by 1/2 MMT grade in all deficient planes (MET)    Long Term Goals: to be achieved by discharge  1) FOTO equal to or greater than 47 (MET)  2) Patient to be independent with comprehensive HEP (MET)  3) Lumbar spine ROM WNL all planes to improve a/iadls (MET)  4) Increase LE strength to 5/5 MMT grade in all planes to improve a/iadls (MET)  5) Patient to report no sleep interruption secondary to pain (MET)  6) Increase tolerance for physical activities to >30 min  (MET)      Plan: The patient has met or exceeded his short & long term goals and is a candidate for discharge from formal PT care to an independent home program        Precautions: PE, DVT (history), Asthma      Manuals 3/24 3/31 4/7   2/24 2/28 3/3 3/10 3/17   Lumbar PAIVM             Lumbar STM             Hip inf glide  SRB SRB   SRB SRB SRB  SRB   Hip post glide SRB SRB SRB     SRB Manual sciatic nerve floss SRB SRB SRB   SRB SRB SRB  SRB   Piriformis stretching      SRB                                              Neuro Re-Ed             Sciatic nerve flossing             Multifidus press   BlaTB x20 ea way        BlaTB x20 ea way, HEP   Cervical retract       supine 3 sec x25, HEP      TA set             TA set w/ marching             TA set w/ BKFO             TA set w/ heel slide      2x10 ea side x20 ea LE x20 ea LE     Hip abd/add iso      3 sec x25 ea 3 sec x25 ea      Supine active 90-90 sciatic glide       Review, add ankle pump HEP 5x5     Bridge  W/ feet on PBall 3x15     Single leg 2x10 on R W/ feet on PBall W/ feet on PBall 3x15 W/ feet on PBall 3x15   Supine LTR  Feet on PBall x10 ea side      Feet on PBall x10 ea side Feet on PBall x10 ea side Feet on PBall x10 ea side   Deadbug  W/ PBall, iso hold 3 sec x25      W/ PBall, iso hold 3x10 W/ PBall, iso hold 3 sec x25 W/ PBall, iso hold 3 sec x25                                          Ther Ex             Active warmup  Recumb bike x6 min Recumb bike x6 min   Recumb bike x8 min Recumb bike x8 min Recumb bike x8 min Recumb bike x8 min Recumb bike x8 min   Prone pressups             SLR             Sidelying clamshell             BKTC             Sidelying hip abd      W/ arc over other foot 2x20 ea  W/ front and back taps 2x15     Prone hip ext      x20 ea straight, x20 ea bent  x20 ea straight, x20 ea bent     Prone hip ER/IR      x25 ea                    Ther Activity             Squat          STS w/ 2x10# dbs, 2x10, HEP   Deadlift  RDL to table w/ 10# db x25        RDL to table w/ 10# db, x25, HEP                                                                    Gait Training                                       Modalities             Mechanical traction      Int 75#-30#, 30 sec-10 sec x10 min total Int 75#-35#, 30 sec-10 sec x10 min total Int 80#-35#, 30 sec-10 sec x15 min total Int 80#-35#, 30 sec-10 sec x15 min total Int 80#-35#, 30 sec-10 sec x15 min total                Assessment Vital sign assessment  RE      RE, [facility] management, patient safety, assessment findings  DC planning      Exam findings, Jose Paget

## 2022-08-11 ENCOUNTER — TELEPHONE (OUTPATIENT)
Dept: DERMATOLOGY | Facility: CLINIC | Age: 67
End: 2022-08-11

## 2023-04-03 ENCOUNTER — TELEPHONE (OUTPATIENT)
Dept: DERMATOLOGY | Facility: CLINIC | Age: 68
End: 2023-04-03

## 2023-08-22 ENCOUNTER — TELEPHONE (OUTPATIENT)
Dept: DERMATOLOGY | Facility: CLINIC | Age: 68
End: 2023-08-22

## 2023-10-06 ENCOUNTER — OFFICE VISIT (OUTPATIENT)
Dept: DERMATOLOGY | Facility: CLINIC | Age: 68
End: 2023-10-06

## 2023-10-06 VITALS — TEMPERATURE: 97.3 F | BODY MASS INDEX: 25.45 KG/M2 | WEIGHT: 171.8 LBS | HEIGHT: 69 IN

## 2023-10-06 DIAGNOSIS — D48.5 NEOPLASM OF UNCERTAIN BEHAVIOR OF SKIN: ICD-10-CM

## 2023-10-06 DIAGNOSIS — L82.1 SEBORRHEIC KERATOSIS: Primary | ICD-10-CM

## 2023-10-06 DIAGNOSIS — D18.01 CHERRY ANGIOMA: ICD-10-CM

## 2023-10-06 DIAGNOSIS — D22.9 MULTIPLE MELANOCYTIC NEVI: ICD-10-CM

## 2023-10-06 PROCEDURE — 88342 IMHCHEM/IMCYTCHM 1ST ANTB: CPT | Performed by: STUDENT IN AN ORGANIZED HEALTH CARE EDUCATION/TRAINING PROGRAM

## 2023-10-06 PROCEDURE — 88341 IMHCHEM/IMCYTCHM EA ADD ANTB: CPT | Performed by: STUDENT IN AN ORGANIZED HEALTH CARE EDUCATION/TRAINING PROGRAM

## 2023-10-06 PROCEDURE — 88305 TISSUE EXAM BY PATHOLOGIST: CPT | Performed by: STUDENT IN AN ORGANIZED HEALTH CARE EDUCATION/TRAINING PROGRAM

## 2023-10-06 NOTE — PATIENT INSTRUCTIONS
1. SEBORRHEIC KERATOSIS    Plan:  Reviewed that these lesions are NOT cancers and cannot harm a person directly. Under Medicare guidelines, the removal of a seborrheic keratosis is NOT covered unless the specific lesion is of medical necessity (interferes with vision, hearing, breathing), or is symptomatic (bleeding, itching, infected, inflamed). Medicare does NOT cover removal simply if the lesions are unsightly. Medicare does cover the evaluation of any lesion to determine if a lesion is or is not cancerous (i.e. skin biopsy). Asymptomatic. No treatment is required. 2. CEVALLOS ANGIOMAS    Assessment and Plan:  Educated that these are benign  Educated that removal is considered aesthetic and would incur a fee. 3. MELANOCYTIC NEVI ("Moles")    Assessment and Plan:  Based on a thorough discussion of this condition and the management approach to it (including a comprehensive discussion of the known risks, side effects and potential benefits of treatment), the patient (family) agrees to implement the following specific plan:  Reassured benign     Melanocytic Nevi  Melanocytic nevi ("moles") are tan or brown, raised or flat areas of the skin which have an increased number of melanocytes. Melanocytes are the cells in our body which make pigment and account for skin color. Some moles are present at birth (I.e., "congenital nevi"), while others come up later in life (i.e., "acquired nevi"). The sun can stimulate the body to make more moles. Sunburns are not the only thing that triggers more moles. Chronic sun exposure can do it too. Clinically distinguishing a healthy mole from melanoma may be difficult, even for experienced dermatologists. The "ABCDE's" of moles have been suggested as a means of helping to alert a person to a suspicious mole and the possible increased risk of melanoma.   The suggestions for raising alert are as follows:    Asymmetry: Healthy moles tend to be symmetric, while melanomas are often asymmetric. Asymmetry means if you draw a line through the mole, the two halves do not match in color, size, shape, or surface texture. Asymmetry can be a result of rapid enlargement of a mole, the development of a raised area on a previously flat lesion, scaling, ulceration, bleeding or scabbing within the mole. Any mole that starts to demonstrate "asymmetry" should be examined promptly by a board certified dermatologist.     Border: Healthy moles tend to have discrete, even borders. The border of a melanoma often blends into the normal skin and does not sharply delineate the mole from normal skin. Any mole that starts to demonstrate "uneven borders" should be examined promptly by a board certified dermatologist.     Color: Healthy moles tend to be one color throughout. Melanomas tend to be made up of different colors ranging from dark black, blue, white, or red. Any mole that demonstrates a color change should be examined promptly by a board certified dermatologist.     Diameter: Healthy moles tend to be smaller than 0.6 cm in size; an exception are "congenital nevi" that can be larger. Melanomas tend to grow and can often be greater than 0.6 cm (1/4 of an inch, or the size of a pencil eraser). This is only a guideline, and many normal moles may be larger than 0.6 cm without being unhealthy. Any mole that starts to change in size (small to bigger or bigger to smaller) should be examined promptly by a board certified dermatologist.     Evolving: Healthy moles tend to "stay the same."  Melanomas may often show signs of change or evolution such as a change in size, shape, color, or elevation.   Any mole that starts to itch, bleed, crust, burn, hurt, or ulcerate or demonstrate a change or evolution should be examined promptly by a board certified dermatologist.      Dysplastic Nevi  Dysplastic moles are moles that fit the ABCDE rules of melanoma but are not identified as melanomas when examined under the microscope. They may indicate an increased risk of melanoma in that person. If there is a family history of melanoma, most experts agree that the person may be at an increased risk for developing a melanoma. Experts still do not agree on what dysplastic moles mean in patients without a personal or family history of melanoma. Dysplastic moles are usually larger than common moles and have different colors within it with irregular borders. The appearance can be very similar to a melanoma. Biopsies of dysplastic moles may show abnormalities which are different from a regular mole. Melanoma  Malignant melanoma is a type of skin cancer that can be deadly if it spreads throughout the body. The incidence of melanoma in the [facility] is growing faster than any other cancer. Melanoma usually grows near the surface of the skin for a period of time, and then begins to grow deeper into the skin. Once it grows deeper into the skin, the risk of spread to other organs greatly increases. Therefore, early detection and removal of a malignant melanoma may result in a better chance at a complete cure; removal after the tumor has spread may not be as effective, leading to worse clinical outcomes such as death. The true rate of nevus transformation into a melanoma is unknown. It has been estimated that the lifetime risk for any acquired melanocytic nevus on any 21year-old individual transforming into melanoma by age 80 is 0.03% (1 in 3,164) for men and 0.009% (1 in 10,800) for women. The appearance of a "new mole" remains one of the most reliable methods for identifying a malignant melanoma. Occasionally, melanomas appear as rapidly growing, blue-black, dome-shaped bumps within a previous mole or previous area of normal skin. Other times, melanomas are suspected when a mole suddenly appears or changes.  Itching, burning, or pain in a pigmented lesion should increase suspicion, but most patients with early melanoma have no skin discomfort whatsoever. Melanoma can occur anywhere on the skin, including areas that are difficult for self-examination. Many melanomas are first noticed by other family members. Suspicious-looking moles may be removed for microscopic examination. You may be able to prevent death from melanoma by doing two simple things:    Try to avoid unnecessary sun exposure and protect your skin when it is exposed to the sun. People who live near the equator, people who have intermittent exposures to large amounts of sun, and people who have had sunburns in childhood or adolescence have an increased risk for melanoma. Sun sense and vigilant sun protection may be keys to helping to prevent melanoma. We recommend wearing UPF-rated sun protective clothing and sunglasses whenever possible and applying a moisturizer-sunscreen combination product (SPF 50+) such as Neutrogena Daily Defense to sun exposed areas of skin at least three times a day. Have your moles regularly examined by a board certified dermatologist AND by yourself or a family member/friend at home. We recommend that you have your moles examined at least once a year by a board certified dermatologist.  Use your birthday as an annual reminder to have your "Birthday Suit" (I.e., your skin) examined; it is a nice birthday gift to yourself to know that your skin is healthy appearing! Additionally, at-home self examinations may be helpful for detecting a possible melanoma. Use the ABCDEs we discussed and check your moles once a month at home. 4. NEOPLASM OF UNCERTAIN BEHAVIOR OF SKIN    Assessment and Plan:  I have discussed with the patient that a sample of skin via a "skin biopsy” would be potentially helpful to further make a specific diagnosis under the microscope.   Based on a thorough discussion of this condition and the management approach to it (including a comprehensive discussion of the known risks, side effects and potential benefits of treatment), the patient (family) agrees to implement the following specific plan:    Procedure:  Skin Biopsy. After a thorough discussion of treatment options and risk/benefits/alternatives (including but not limited to local pain, scarring, dyspigmentation, blistering, possible superinfection, and inability to confirm a diagnosis via histopathology), verbal and written consent were obtained and portion of the rash was biopsied for tissue sample. See below for consent that was obtained from patient and subsequent Procedure Note. PROCEDURE TANGENTIAL (SHAVE) BIOPSY NOTE:    INFORMED CONSENT DISCUSSION AND POST-OPERATIVE INSTRUCTIONS FOR PATIENT    I.  RATIONALE FOR PROCEDURE  I understand that a skin biopsy allows the Dermatologist to test a lesion or rash under the microscope to obtain a diagnosis. It usually involves numbing the area with numbing medication and removing a small piece of skin; sometimes the area will be closed with sutures. In this specific procedure, sutures are not usually needed. If any sutures are placed, then they are usually need to be removed in 2 weeks or less. I understand that my Dermatologist recommends that a skin "shave" biopsy be performed today. A local anesthetic, similar to the kind that a dentist uses when filling a cavity, will be injected with a very small needle into the skin area to be sampled. The injected skin and tissue underneath "will go to sleep” and become numb so no pain should be felt afterwards. An instrument shaped like a tiny "razor blade" (shave biopsy instrument) will be used to cut a small piece of tissue and skin from the area so that a sample of tissue can be taken and examined more closely under the microscope. A slight amount of bleeding will occur, but it will be stopped with direct pressure and a pressure bandage and any other appropriate methods. I understands that a scar will form where the wound was created. Surgical ointment will be applied to help protect the wound. Sutures are not usually needed. II.  RISKS AND POTENTIAL COMPLICATIONS   I understand the risks and potential complications of a skin biopsy include but are not limited to the following:  Bleeding  Infection  Pain  Scar/keloid  Skin discoloration  Incomplete Removal  Recurrence  Nerve Damage/Numbness/Loss of Function  Allergic Reaction to Anesthesia  Biopsies are diagnostic procedures and based on findings additional treatment or evaluation may be required  Loss or destruction of specimen resulting in no additional findings    My Dermatologist has explained to me the nature of the condition, the nature of the procedure, and the benefits to be reasonably expected compared with alternative approaches. My Dermatologist has discussed the likelihood of major risks or complications of this procedure including the specific risks listed above, such as bleeding, infection, and scarring/keloid. I understand that a scar is expected after this procedure. I understand that my physician cannot predict if the scar will form a "keloid," which extends beyond the borders of the wound that is created. A keloid is a thick, painful, and bumpy scar. A keloid can be difficult to treat, as it does not always respond well to therapy, which includes injecting cortisone directly into the keloid every few weeks. While this usually reduces the pain and size of the scar, it does not eliminate it. I understand that photographs may be taken before and after the procedure. These will be maintained as part of the medical providers confidential records and may not be made available to me. I further authorize the medical provider to use the photographs for teaching purposes or to illustrate scientific papers, books, or lectures if in his/her judgment, medical research, education, or science may benefit from its use.     I have had an opportunity to fully inquire about the risks and benefits of this procedure and its alternatives. I have been given ample time and opportunity to ask questions and to seek a second opinion if I wished to do so. I acknowledge that there have specifically been no guarantees as to the cosmetic results from the procedure. I am aware that with any procedure there is always the possibility of an unexpected complication. III. POST-PROCEDURAL CARE (WHAT YOU WILL NEED TO DO "AFTER THE BIOPSY" TO OPTIMIZE HEALING)    Keep the area clean and dry. Try NOT to remove the bandage or get it wet for the first 24 hours. Gently clean the area and apply surgical ointment (such as Vaseline petrolatum ointment, which is available "over the counter" and not a prescription) to the biopsy site for up to 2 weeks straight. This acts to protect the wound from the outside world. Sutures are not usually placed in this procedure. If any sutures were placed, return for suture removal as instructed (generally 1 week for the face, 2 weeks for the body). Take Acetaminophen (Tylenol) for discomfort, if no contraindications. Ibuprofen or aspirin could make bleeding worse. Call our office immediately for signs of infection: fever, chills, increased redness, warmth, tenderness, discomfort/pain, or pus or foul smell coming from the wound. WHAT TO DO IF THERE IS ANY BLEEDING? If a small amount of bleeding is noticed, place a clean cloth over the area and apply firm pressure for ten minutes. Check the wound after 10 minutes of direct pressure. If bleeding persists, try one more time for an additional 10 minutes of direct pressure on the area. If the bleeding becomes heavier or does not stop after the second attempt, or if you have any other questions about this procedure, then please call your [facility] by calling [phone] (SKIN).      I hereby acknowledge that I have reviewed and verified the site with my Dermatologist and have requested and authorized my Dermatologist to proceed with the procedure.

## 2023-10-06 NOTE — PROGRESS NOTES
[facility] Note     Patient Name: Deepali Han  Encounter Date: 10/06/2023     Have you been cared for by a [facility] Dermatologist in the last 3 years and, if so, which description applies to you? NO. I am considered a "new" patient and must complete all patient intake questions. I am MALE/not capable of bearing children. REVIEW OF SYSTEMS:  Have you recently had or currently have any of the following? · Recent fever or chills? No  · Any non-healing wound? No   PAST MEDICAL HISTORY:  Have you personally ever had or currently have any of the following? If "YES," then please provide more detail. · Skin cancer (such as Melanoma, Basal Cell Carcinoma, Squamous Cell Carcinoma? No  · Tuberculosis, HIV/AIDS, Hepatitis B or C: No  · Systemic Immunosuppression such as Diabetes, Biologic or Immunotherapy, Chemotherapy, Organ Transplantation, Bone Marrow Transplantation No  · Radiation Treatment No   FAMILY HISTORY:  Any "first degree relatives" (parent, brother, sister, or child) with the following? • Skin Cancer, Pancreatic or Other Cancer? YES, Father- Colon cancer   PATIENT EXPERIENCE:    • Do you want the Dermatologist to perform a COMPLETE skin exam today including a clinical examination under the "bra and underwear" areas? Yes  • If necessary, do we have your permission to call and leave a detailed message on your Preferred Phone number that includes your specific medical information?   Yes      No Known Allergies   Current Outpatient Medications:   •  albuterol (VENTOLIN HFA) 90 mcg/act inhaler, Inhale 2 puffs every 6 (six) hours as needed for wheezing, Disp: 1 Inhaler, Rfl: 11  •  Ascorbic Acid (VITAMIN C PO), Take by mouth, Disp: , Rfl:   •  CALCIUM ASCORBATE PO, Take by mouth, Disp: , Rfl:   •  Calcium Polycarbophil (FIBER-CAPS PO), Take by mouth, Disp: , Rfl:   •  colestipol (COLESTID) 1 g tablet, Take 1 g by mouth 2 (two) times a day (Patient not taking: Reported on 9/9/2021), Disp: , Rfl:   •  cyclobenzaprine (FLEXERIL) 5 mg tablet, 1-2 PO TID PRN, Disp: 12 tablet, Rfl: 0  •  cycloSPORINE (RESTASIS) 0.05 % ophthalmic emulsion, Restasis 0.05 % eye drops in a dropperette, Disp: , Rfl:   •  methocarbamol (ROBAXIN) 500 mg tablet, Take 1 tablet (500 mg total) by mouth 2 (two) times a day (Patient not taking: Reported on 9/9/2021), Disp: 20 tablet, Rfl: 0  •  methocarbamol (ROBAXIN) 500 mg tablet, Take 1 tablet (500 mg total) by mouth 2 (two) times a day as needed for muscle spasms, Disp: 15 tablet, Rfl: 0  •  methylPREDNISolone 4 MG tablet therapy pack, Use as directed on package, Disp: 21 tablet, Rfl: 0  •  multivitamin (THERAGRAN) TABS, Take 1 tablet by mouth daily, Disp: , Rfl:   •  Omega-3 Fatty Acids (FISH OIL) 1,000 mg, Take 1,000 mg by mouth daily, Disp: , Rfl:   •  oxyCODONE-acetaminophen (PERCOCET) 5-325 mg per tablet, Take 1 tablet by mouth every 4 (four) hours as needed for moderate pain for up to 10 dosesMax Daily Amount: 6 tablets, Disp: 8 tablet, Rfl: 0  •  Probiotic Product (PROBIOTIC DAILY PO), Take by mouth, Disp: , Rfl:   •  rivaroxaban (XARELTO) 10 mg tablet, Take 1 tablet (10 mg total) by mouth daily, Disp: 90 tablet, Rfl: 1  •  rosuvastatin (CRESTOR) 10 MG tablet, rosuvastatin 10 mg tablet  TAKE 1 TABLET BY MOUTH EVERY DAY AT BEDTIME, Disp: , Rfl:   •  senna (Senna-Time) 8.6 MG tablet, Daily (Patient not taking: Reported on 9/9/2021), Disp: , Rfl:   •  sertraline (ZOLOFT) 25 mg tablet, sertraline 25 mg tablet  take 1 tablet by mouth once daily (Patient not taking: Reported on 9/9/2021), Disp: , Rfl:   •  Turmeric (QC TUMERIC COMPLEX PO), Take by mouth, Disp: , Rfl:   •  VITAMIN D PO, Take by mouth, Disp: , Rfl:           • Whom besides the patient is providing clinical information about today's encounter?   o NO ADDITIONAL HISTORIAN (patient alone provided history)    Physical Exam and Assessment/Plan by Diagnosis:    1.  SEBORRHEIC KERATOSIS    Physical Exam:  • Anatomic Location: Mid left back, trunk, extremities  • Morphologic Description:  Waxy "stuck-on" grey and brown discrete papules   • Any active signs of "inflamed" status:  NONE  • Pertinent Positives:  • Pertinent Negatives: Additional History of Present Condition:   ASYMPTOMATIC (per Medicare definition) referred by PCP for spot of concern on back     Plan:  • Reviewed that these lesions are NOT cancers and cannot harm a person directly. • Under Medicare guidelines, the removal of a seborrheic keratosis is NOT covered unless the specific lesion is of medical necessity (interferes with vision, hearing, breathing), or is symptomatic (bleeding, itching, infected, inflamed). Medicare does NOT cover removal simply if the lesions are unsightly. Medicare does cover the evaluation of any lesion to determine if a lesion is or is not cancerous (i.e. skin biopsy). • Asymptomatic. No treatment is required. Medical Complexity:    SELF-LIMITED OR MINOR PROBLEM. Problem runs a definite and prescribed course, is transient in nature, and is not likely to permanently alter health status. 2. CHERRY ANGIOMAS    Physical Exam:  • Anatomic Location Affected:  Trunk   • Morphological Description:  red to purple colored macules  • Pertinent Positives:  • Pertinent Negatives: Additional History of Present Condition:  Noted on exam     Assessment and Plan:  • Educated that these are benign  • Educated that removal is considered aesthetic and would incur a fee.     3. MELANOCYTIC NEVI ("Moles")    Physical Exam:  • Anatomic Location Affected:  Trunk, extremities  • Morphological Description:  Scattered, 1-4mm round to ovoid, symmetrical-appearing, even bordered, skin colored to dark brown macules/papules, mostly in sun-exposed areas  • Pertinent Positives:  • Pertinent Negatives: No regional LAD    Additional History of Present Condition:  Noted on exam    Assessment and Plan:  Based on a thorough discussion of this condition and the management approach to it (including a comprehensive discussion of the known risks, side effects and potential benefits of treatment), the patient (family) agrees to implement the following specific plan:  • Reassured benign  • Use a moisturizer + sunscreen "combo" product such as Neutrogena Daily Defense SPF 50+ or CeraVe AM at least three times a day. • Annual exams (at least!)     Melanocytic Nevi  Melanocytic nevi ("moles") are tan or brown, raised or flat areas of the skin which have an increased number of melanocytes. Melanocytes are the cells in our body which make pigment and account for skin color. Some moles are present at birth (I.e., "congenital nevi"), while others come up later in life (i.e., "acquired nevi"). The sun can stimulate the body to make more moles. Sunburns are not the only thing that triggers more moles. Chronic sun exposure can do it too. Clinically distinguishing a healthy mole from melanoma may be difficult, even for experienced dermatologists. The "ABCDE's" of moles have been suggested as a means of helping to alert a person to a suspicious mole and the possible increased risk of melanoma. The suggestions for raising alert are as follows:    Asymmetry: Healthy moles tend to be symmetric, while melanomas are often asymmetric. Asymmetry means if you draw a line through the mole, the two halves do not match in color, size, shape, or surface texture. Asymmetry can be a result of rapid enlargement of a mole, the development of a raised area on a previously flat lesion, scaling, ulceration, bleeding or scabbing within the mole. Any mole that starts to demonstrate "asymmetry" should be examined promptly by a board certified dermatologist.     Border: Healthy moles tend to have discrete, even borders. The border of a melanoma often blends into the normal skin and does not sharply delineate the mole from normal skin.   Any mole that starts to demonstrate "uneven borders" should be examined promptly by a board certified dermatologist.     Color: Healthy moles tend to be one color throughout. Melanomas tend to be made up of different colors ranging from dark black, blue, white, or red. Any mole that demonstrates a color change should be examined promptly by a board certified dermatologist.     Diameter: Healthy moles tend to be smaller than 0.6 cm in size; an exception are "congenital nevi" that can be larger. Melanomas tend to grow and can often be greater than 0.6 cm (1/4 of an inch, or the size of a pencil eraser). This is only a guideline, and many normal moles may be larger than 0.6 cm without being unhealthy. Any mole that starts to change in size (small to bigger or bigger to smaller) should be examined promptly by a board certified dermatologist.     Evolving: Healthy moles tend to "stay the same."  Melanomas may often show signs of change or evolution such as a change in size, shape, color, or elevation. Any mole that starts to itch, bleed, crust, burn, hurt, or ulcerate or demonstrate a change or evolution should be examined promptly by a board certified dermatologist.      Dysplastic Nevi  Dysplastic moles are moles that fit the ABCDE rules of melanoma but are not identified as melanomas when examined under the microscope. They may indicate an increased risk of melanoma in that person. If there is a family history of melanoma, most experts agree that the person may be at an increased risk for developing a melanoma. Experts still do not agree on what dysplastic moles mean in patients without a personal or family history of melanoma. Dysplastic moles are usually larger than common moles and have different colors within it with irregular borders. The appearance can be very similar to a melanoma. Biopsies of dysplastic moles may show abnormalities which are different from a regular mole.       Melanoma  Malignant melanoma is a type of skin cancer that can be deadly if it spreads throughout the body. The incidence of melanoma in the [facility] is growing faster than any other cancer. Melanoma usually grows near the surface of the skin for a period of time, and then begins to grow deeper into the skin. Once it grows deeper into the skin, the risk of spread to other organs greatly increases. Therefore, early detection and removal of a malignant melanoma may result in a better chance at a complete cure; removal after the tumor has spread may not be as effective, leading to worse clinical outcomes such as death. The true rate of nevus transformation into a melanoma is unknown. It has been estimated that the lifetime risk for any acquired melanocytic nevus on any 21year-old individual transforming into melanoma by age 80 is 0.03% (1 in 3,164) for men and 0.009% (1 in 10,800) for women. The appearance of a "new mole" remains one of the most reliable methods for identifying a malignant melanoma. Occasionally, melanomas appear as rapidly growing, blue-black, dome-shaped bumps within a previous mole or previous area of normal skin. Other times, melanomas are suspected when a mole suddenly appears or changes. Itching, burning, or pain in a pigmented lesion should increase suspicion, but most patients with early melanoma have no skin discomfort whatsoever. Melanoma can occur anywhere on the skin, including areas that are difficult for self-examination. Many melanomas are first noticed by other family members. Suspicious-looking moles may be removed for microscopic examination. You may be able to prevent death from melanoma by doing two simple things:    1. Try to avoid unnecessary sun exposure and protect your skin when it is exposed to the sun. People who live near the equator, people who have intermittent exposures to large amounts of sun, and people who have had sunburns in childhood or adolescence have an increased risk for melanoma.  Sun sense and vigilant sun protection may be keys to helping to prevent melanoma. We recommend wearing UPF-rated sun protective clothing and sunglasses whenever possible and applying a moisturizer-sunscreen combination product (SPF 50+) such as Neutrogena Daily Defense to sun exposed areas of skin at least three times a day. 2. Have your moles regularly examined by a board certified dermatologist AND by yourself or a family member/friend at home. We recommend that you have your moles examined at least once a year by a board certified dermatologist.  Use your birthday as an annual reminder to have your "Birthday Suit" (I.e., your skin) examined; it is a nice birthday gift to yourself to know that your skin is healthy appearing! Additionally, at-home self examinations may be helpful for detecting a possible melanoma. Use the ABCDEs we discussed and check your moles once a month at home. 4. NEOPLASM OF UNCERTAIN BEHAVIOR OF SKIN    Physical Exam:  • (Anatomic Location); (Size and Morphological Description); (Differential Diagnosis):  o Mid lower back; 0.6 cm brown macule with peripheral hyperpigmentation; Diff Dx: atypical nevus versus melanoma  • Pertinent Positives:  • Pertinent Negatives: Additional History of Present Condition:  Noted on exam, asymptomatic, [place] skin type 1    Assessment and Plan:  • I have discussed with the patient that a sample of skin via a "skin biopsy” would be potentially helpful to further make a specific diagnosis under the microscope. • Based on a thorough discussion of this condition and the management approach to it (including a comprehensive discussion of the known risks, side effects and potential benefits of treatment), the patient (family) agrees to implement the following specific plan:    o Procedure:  Skin Biopsy.   After a thorough discussion of treatment options and risk/benefits/alternatives (including but not limited to local pain, scarring, dyspigmentation, blistering, possible superinfection, and inability to confirm a diagnosis via histopathology), verbal and written consent were obtained and portion of the rash was biopsied for tissue sample. See below for consent that was obtained from patient and subsequent Procedure Note. PROCEDURE TANGENTIAL (SHAVE) BIOPSY NOTE:    • Performing Physician: Charli Alba  • Anatomic Location; Clinical Description with size (cm); Pre-Op Diagnosis:   o Midline lower back; 0.6 cm brown macule with peripheral hyperpigmentation; nevus rule out atypia   • Post-op diagnosis: Same     • Local anesthesia: 1% xylocaine with epi      • Topical anesthesia: None    • Hemostasis: Aluminum chloride       After obtaining informed consent  at which time there was a discussion about the purpose of biopsy  and low risks of infection and bleeding. The area was prepped and draped in the usual fashion. Anesthesia was obtained with 1% lidocaine with epinephrine. A shave biopsy to an appropriate sampling depth was obtained by Shave (Dermablade or 15 blade) The resulting wound was covered with surgical ointment and bandaged appropriately. The patient tolerated the procedure well without complications and was without signs of functional compromise. Specimen has been sent for review by Dermatopathology. Standard post-procedure care has been explained and has been included in written form within the patient's copy of Informed Consent. INFORMED CONSENT DISCUSSION AND POST-OPERATIVE INSTRUCTIONS FOR PATIENT    I.  RATIONALE FOR PROCEDURE  I understand that a skin biopsy allows the Dermatologist to test a lesion or rash under the microscope to obtain a diagnosis. It usually involves numbing the area with numbing medication and removing a small piece of skin; sometimes the area will be closed with sutures. In this specific procedure, sutures are not usually needed. If any sutures are placed, then they are usually need to be removed in 2 weeks or less.     I understand that my Dermatologist recommends that a skin "shave" biopsy be performed today. A local anesthetic, similar to the kind that a dentist uses when filling a cavity, will be injected with a very small needle into the skin area to be sampled. The injected skin and tissue underneath "will go to sleep” and become numb so no pain should be felt afterwards. An instrument shaped like a tiny "razor blade" (shave biopsy instrument) will be used to cut a small piece of tissue and skin from the area so that a sample of tissue can be taken and examined more closely under the microscope. A slight amount of bleeding will occur, but it will be stopped with direct pressure and a pressure bandage and any other appropriate methods. I understands that a scar will form where the wound was created. Surgical ointment will be applied to help protect the wound. Sutures are not usually needed. II.  RISKS AND POTENTIAL COMPLICATIONS   I understand the risks and potential complications of a skin biopsy include but are not limited to the following:  • Bleeding  • Infection  • Pain  • Scar/keloid  • Skin discoloration  • Incomplete Removal  • Recurrence  • Nerve Damage/Numbness/Loss of Function  • Allergic Reaction to Anesthesia  • Biopsies are diagnostic procedures and based on findings additional treatment or evaluation may be required  • Loss or destruction of specimen resulting in no additional findings    My Dermatologist has explained to me the nature of the condition, the nature of the procedure, and the benefits to be reasonably expected compared with alternative approaches. My Dermatologist has discussed the likelihood of major risks or complications of this procedure including the specific risks listed above, such as bleeding, infection, and scarring/keloid. I understand that a scar is expected after this procedure.   I understand that my physician cannot predict if the scar will form a "keloid," which extends beyond the borders of the wound that is created. A keloid is a thick, painful, and bumpy scar. A keloid can be difficult to treat, as it does not always respond well to therapy, which includes injecting cortisone directly into the keloid every few weeks. While this usually reduces the pain and size of the scar, it does not eliminate it. I understand that photographs may be taken before and after the procedure. These will be maintained as part of the medical providers confidential records and may not be made available to me. I further authorize the medical provider to use the photographs for teaching purposes or to illustrate scientific papers, books, or lectures if in his/her judgment, medical research, education, or science may benefit from its use. I have had an opportunity to fully inquire about the risks and benefits of this procedure and its alternatives. I have been given ample time and opportunity to ask questions and to seek a second opinion if I wished to do so. I acknowledge that there have specifically been no guarantees as to the cosmetic results from the procedure. I am aware that with any procedure there is always the possibility of an unexpected complication. III. POST-PROCEDURAL CARE (WHAT YOU WILL NEED TO DO "AFTER THE BIOPSY" TO OPTIMIZE HEALING)    • Keep the area clean and dry. Try NOT to remove the bandage or get it wet for the first 24 hours. • Gently clean the area and apply surgical ointment (such as Vaseline petrolatum ointment, which is available "over the counter" and not a prescription) to the biopsy site for up to 2 weeks straight. This acts to protect the wound from the outside world. • Sutures are not usually placed in this procedure. If any sutures were placed, return for suture removal as instructed (generally 1 week for the face, 2 weeks for the body). • Take Acetaminophen (Tylenol) for discomfort, if no contraindications.   Ibuprofen or aspirin could make bleeding worse. • Call our office immediately for signs of infection: fever, chills, increased redness, warmth, tenderness, discomfort/pain, or pus or foul smell coming from the wound. WHAT TO DO IF THERE IS ANY BLEEDING? If a small amount of bleeding is noticed, place a clean cloth over the area and apply firm pressure for ten minutes. Check the wound after 10 minutes of direct pressure. If bleeding persists, try one more time for an additional 10 minutes of direct pressure on the area. If the bleeding becomes heavier or does not stop after the second attempt, or if you have any other questions about this procedure, then please call your [facility]'s Dermatologist by calling [phone] (SKIN). I hereby acknowledge that I have reviewed and verified the site with my Dermatologist and have requested and authorized my Dermatologist to proceed with the procedure.     Madelin Menezes MD- Dermatology PGY2    Scribe Attestation    I,:  Itz Dupont am acting as a scribe while in the presence of the attending physician.:       I,:  Keya Donald MD personally performed the services described in this documentation    as scribed in my presence.:

## 2023-10-11 PROCEDURE — 88342 IMHCHEM/IMCYTCHM 1ST ANTB: CPT | Performed by: STUDENT IN AN ORGANIZED HEALTH CARE EDUCATION/TRAINING PROGRAM

## 2023-10-11 PROCEDURE — 88305 TISSUE EXAM BY PATHOLOGIST: CPT | Performed by: STUDENT IN AN ORGANIZED HEALTH CARE EDUCATION/TRAINING PROGRAM

## 2023-10-11 PROCEDURE — 88341 IMHCHEM/IMCYTCHM EA ADD ANTB: CPT | Performed by: STUDENT IN AN ORGANIZED HEALTH CARE EDUCATION/TRAINING PROGRAM

## 2023-10-12 NOTE — RESULT ENCOUNTER NOTE
DERMATOPATHOLOGY RESULT NOTE    Results reviewed by ordering physician. Called patient to personally discuss results. No answer, left voicemail with result. Instructions for Clinical Derm Team:   (remember to route Result Note to appropriate staff):    None    Result & Plan by Specimen:    Specimen A: benign, junctional nevus w mild atypia  Plan: monitor and reassured, benign      Tissue Exam: [id number]  Order: [id number]  Status: Final result      Visible to patient: Yes (not seen)      Dx: Neoplasm of uncertain behavior of skin    0 Result Notes     Component   Case Report  Surgical Pathology Report                         Case: [id number]                                  Authorizing Provider:  Tia Marin MD              Collected:           10/06/2023 1320              Ordering Location:     [facility]      Received:            10/06/2023 [address] [place] [address]                                                                      Pathologist:           Alex Brito MD                                                          Specimen:    Skin, Other, A: Midline lower back                                                      Final Diagnosis  A. 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 Note: SOX10, MART-1, and PRAME immunostains were reviewed; the lesional cells are negative for PRAME. While the lesion is not seen at examined inked specimen margins, margin evaluation of shave specimens is occasionally inadequate in excluding presence of lesion at margins; clinical pathological correlation is advised. Electronically signed by Alex Brito MD on 10/11/2023 at  5:17 PM  Additional Information   All reported additional testing was performed with appropriately reactive controls.   These tests were developed and their performance characteristics determined by [facility] or appropriate performing facility, though some tests may be performed on tissues which have not been validated for performance characteristics (such as staining performed on alcohol exposed cell blocks and decalcified tissues). Results should be interpreted with caution and in the context of the patients' clinical condition. These tests may not be cleared or approved by the U.S. Food and Drug Administration, though the FDA has determined that such clearance or approval is not necessary. These tests are used for clinical purposes and they should not be regarded as investigational or for research. This laboratory has been approved by CLIA 88, designated as a high-complexity laboratory and is qualified to perform these tests. Tiffanie Sofia Description   A. The specimen is received in formalin, labeled with the patient's name and hospital number, and is designated " midline lower back". The specimen consists of a 1.0 x 0.8 x 0.1 cm shave biopsy of tan skin. The epithelial surface exhibits a brown pigmented macule measuring 0.7 x 0.6 cm abutting the closest peripheral margin. The epithelial surface is inked red and the margin of resection is inked green. The specimen is serially sectioned revealing grossly unremarkable cut surfaces. The specimen is entirely submitted between sponges, 1 cassette. Note: The estimated total formalin fixation time based upon information provided by the submitting clinician and the standard processing schedule is under 72 hours. -Williams Bamberger  Clinical Information   ATTENTION:  [facility]    SPECIMEN A; Skin;  Anatomic Location: Midline lower back; Procedure/Protocol: Skin Specimen (submit in FORMALIN):Tangential Biopsy (includes shave, scoop, saucerization, curette) (CPT 91620; each additional tangential biopsy is CPT 15408)  74y.o. year old  Male with a Morphological Description: 0.6 cm brown macule with peripheral hyperpigmentation  Differential Diagnosis and/or Specific Clinical Question: nevus rule out atypia

## 2024-01-10 NOTE — PROGRESS NOTES
Consultation - [facility]  Edgard Katesheri 1955 male         Chief Complaint:  For colonoscopy    HPI:  68-year-old male with history of DVT, pulmonary embolism, factor 5 Leiden mutation, positive lupus anticoagulant on anticoagulation therapy with Xarelto was referred for colonoscopy  He had colonoscopy about 6 years ago and was told about colon polyps  Patient has regular bowel movements and denies any blood or mucus in the stool  Appetite is good and denies any recent weight loss  Denies any abdominal pain, nausea, or vomiting  Has no heartburn or acid reflux  Denies any difficulty swallowing  REVIEW OF SYSTEMS: Review of Systems   Constitutional: Negative for activity change, appetite change, chills, diaphoresis, fatigue, fever and unexpected weight change  HENT: Negative for ear discharge, ear pain, facial swelling, hearing loss, nosebleeds, sore throat, tinnitus and voice change  Eyes: Negative for pain, discharge, redness, itching and visual disturbance  Respiratory: Negative for apnea, cough, chest tightness, shortness of breath and wheezing  Cardiovascular: Negative for chest pain and palpitations  Gastrointestinal:        As noted in HPI   Endocrine: Negative for cold intolerance, heat intolerance and polyuria  Genitourinary: Negative for difficulty urinating, dysuria, flank pain, hematuria and urgency  Musculoskeletal: Positive for arthralgias  Negative for back pain, gait problem, joint swelling and myalgias  Skin: Negative for rash and wound  Neurological: Negative for dizziness, tremors, seizures, speech difficulty, light-headedness, numbness and headaches  Hematological: Negative for adenopathy  Does not bruise/bleed easily  Psychiatric/Behavioral: Negative for agitation, behavioral problems and confusion  The patient is not nervous/anxious           Past Medical History:   Diagnosis Date    DVT (deep venous thrombosis) (HCC)     LLE    Pulmonary emboli (HCC)       Past Surgical History:   Procedure Laterality Date    ROTATOR CUFF REPAIR      SINUS SURGERY       Social History     Socioeconomic History    Marital status: /Civil Union     Spouse name: Not on file    Number of children: Not on file    Years of education: Not on file    Highest education level: Not on file   Occupational History    Not on file   Social Needs    Financial resource strain: Not on file    Food insecurity:     Worry: Not on file     Inability: Not on file    Transportation needs:     Medical: Not on file     Non-medical: Not on file   Tobacco Use    Smoking status: Former Smoker     Packs/day:      Years: 15      Pack years: 15      Types: Cigarettes, Cigars     Last attempt to quit:      Years since quittin 5    Smokeless tobacco: Never Used   Substance and Sexual Activity    Alcohol use: Yes     Frequency: Monthly or less     Comment: rare    Drug use: Never    Sexual activity: Not on file   Lifestyle    Physical activity:     Days per week: Not on file     Minutes per session: Not on file    Stress: Not on file   Relationships    Social connections:     Talks on phone: Not on file     Gets together: Not on file     Attends Religion service: Not on file     Active member of club or organization: Not on file     Attends meetings of clubs or organizations: Not on file     Relationship status: Not on file    Intimate partner violence:     Fear of current or ex partner: Not on file     Emotionally abused: Not on file     Physically abused: Not on file     Forced sexual activity: Not on file   Other Topics Concern    Not on file   Social History Narrative    Not on file     History reviewed  No pertinent family history  Patient has no known allergies    Current Outpatient Medications   Medication Sig Dispense Refill    albuterol (VENTOLIN HFA) 90 mcg/act inhaler Inhale 2 puffs every 6 (six) hours as needed for wheezing 1 Inhaler 11    colestipol (COLESTID) 1 g tablet Take 1 g by mouth 2 (two) times a day      cycloSPORINE (RESTASIS) 0 05 % ophthalmic emulsion Restasis 0 05 % eye drops in a dropperette      multivitamin (THERAGRAN) TABS Take 1 tablet by mouth daily      Omega-3 Fatty Acids (FISH OIL) 1,000 mg Take 1,000 mg by mouth daily      rivaroxaban (XARELTO) 20 mg tablet Take 20 mg by mouth daily       Na Sulfate-K Sulfate-Mg Sulf (Suprep Bowel Prep Kit) 17 5-3 13-1 6 GM/177ML SOLN Take 2 Bottles by mouth see administration instructions Please follow the instructions from the office 2 Bottle 0    penicillin V potassium (VEETID) 500 mg tablet penicillin V potassium 500 mg tablet       No current facility-administered medications for this visit  Blood pressure 126/78, pulse 82, temperature 98 2 °F (36 8 °C), temperature source Tympanic, height 5' 9" (1 753 m), weight 83 9 kg (185 lb)  PHYSICAL EXAM: Physical Exam   Constitutional: He is oriented to person, place, and time  He appears well-developed  HENT:   Head: Normocephalic and atraumatic  Mouth/Throat: Oropharynx is clear and moist    Eyes: Pupils are equal, round, and reactive to light  Conjunctivae are normal  Right eye exhibits no discharge  Left eye exhibits no discharge  No scleral icterus  Neck: Neck supple  No JVD present  No tracheal deviation present  No thyromegaly present  Cardiovascular: Normal rate, regular rhythm, normal heart sounds and intact distal pulses  Exam reveals no gallop and no friction rub  No murmur heard  Pulmonary/Chest: Effort normal and breath sounds normal  No respiratory distress  He has no wheezes  He has no rales  He exhibits no tenderness  Abdominal: Soft  Bowel sounds are normal  He exhibits no distension and no mass  There is no tenderness  There is no rebound and no guarding  No hernia  Musculoskeletal: He exhibits no edema  Lymphadenopathy:     He has no cervical adenopathy     Neurological: He is alert and oriented to person, place, and time  Skin: Skin is warm and dry  No rash noted  No erythema  Psychiatric: He has a normal mood and affect  His behavior is normal  Thought content normal         Lab Results   Component Value Date    WBC 6 2 06/19/2020    HGB 15 7 06/19/2020    HCT 46 6 06/19/2020    MCV 91 06/19/2020     06/19/2020     Lab Results   Component Value Date    CALCIUM 9 0 01/20/2020    K 4 3 01/20/2020    CO2 26 01/20/2020     01/20/2020    BUN 14 01/20/2020    CREATININE 1 19 01/20/2020     Lab Results   Component Value Date    ALT 29 11/18/2019    AST 18 11/18/2019    ALKPHOS 85 11/18/2019     Lab Results   Component Value Date    INR 0 92 11/18/2019    PROTIME 11 8 11/18/2019       No results found  ASSESSMENT & PLAN:    History of colon polyps  Personal history of colon polyps- patient is at increased risk for colon cancer screening  Rule out colorectal lesions including polyps or malignancy     -Schedule for colonoscopy  -High-fiber diet     -Patient was given instructions about the colonoscopy prep     -Patient was explained about  the risks and benefits of the procedure  Risks including but not limited to bleeding, infection, perforation were explained in detail  Also explained about less than 100% sensitivity with the exam and other alternatives  Anticoagulant long-term use  Patient is at increased risks because of anticoagulation therapy  We need to get a clearance from his hematologist about holding Xarelto prior to the procedures or if he need to use the bridging therapy with Lovenox    If it is too risky to hold the anticoagulation therapy we can consider diagnostic colonoscopy on Xarelto LUIS CARLOS/SYD/Elizabeth

## 2024-02-21 PROBLEM — Z12.11 SCREENING FOR COLON CANCER: Status: RESOLVED | Noted: 2020-07-30 | Resolved: 2024-02-21

## 2024-03-06 ENCOUNTER — HOSPITAL ENCOUNTER (EMERGENCY)
Facility: HOSPITAL | Age: 69
Discharge: HOME | End: 2024-03-06
Attending: EMERGENCY MEDICINE
Payer: COMMERCIAL

## 2024-03-06 ENCOUNTER — APPOINTMENT (EMERGENCY)
Dept: CT IMAGING | Facility: HOSPITAL | Age: 69
End: 2024-03-06
Payer: COMMERCIAL

## 2024-03-06 VITALS
TEMPERATURE: 97.7 F | OXYGEN SATURATION: 97 % | RESPIRATION RATE: 18 BRPM | DIASTOLIC BLOOD PRESSURE: 87 MMHG | SYSTOLIC BLOOD PRESSURE: 165 MMHG | HEART RATE: 64 BPM

## 2024-03-06 DIAGNOSIS — R07.9 CHEST PAIN: Primary | ICD-10-CM

## 2024-03-06 DIAGNOSIS — U07.1 COVID-19 VIRUS INFECTION: ICD-10-CM

## 2024-03-06 LAB
2HR DELTA HS TROPONIN: -1 NG/L
ALBUMIN SERPL BCP-MCNC: 4.2 G/DL (ref 3.5–5)
ALP SERPL-CCNC: 80 U/L (ref 34–104)
ALT SERPL W P-5'-P-CCNC: 15 U/L (ref 7–52)
ANION GAP SERPL CALCULATED.3IONS-SCNC: 9 MMOL/L
APTT PPP: 27 SECONDS (ref 23–37)
AST SERPL W P-5'-P-CCNC: 17 U/L (ref 13–39)
ATRIAL RATE: 60 BPM
ATRIAL RATE: 62 BPM
ATRIAL RATE: 64 BPM
BASOPHILS # BLD AUTO: 0.03 THOUSANDS/ÂΜL (ref 0–0.1)
BASOPHILS NFR BLD AUTO: 0 % (ref 0–1)
BILIRUB DIRECT SERPL-MCNC: 0.07 MG/DL (ref 0–0.2)
BILIRUB SERPL-MCNC: 0.37 MG/DL (ref 0.2–1)
BNP SERPL-MCNC: 23 PG/ML (ref 0–100)
BUN SERPL-MCNC: 23 MG/DL (ref 5–25)
CALCIUM SERPL-MCNC: 10 MG/DL (ref 8.4–10.2)
CARDIAC TROPONIN I PNL SERPL HS: 2 NG/L
CARDIAC TROPONIN I PNL SERPL HS: 3 NG/L
CHLORIDE SERPL-SCNC: 100 MMOL/L (ref 96–108)
CO2 SERPL-SCNC: 26 MMOL/L (ref 21–32)
CREAT SERPL-MCNC: 1.06 MG/DL (ref 0.6–1.3)
EOSINOPHIL # BLD AUTO: 0.04 THOUSAND/ÂΜL (ref 0–0.61)
EOSINOPHIL NFR BLD AUTO: 1 % (ref 0–6)
ERYTHROCYTE [DISTWIDTH] IN BLOOD BY AUTOMATED COUNT: 12.2 % (ref 11.6–15.1)
FLUAV RNA RESP QL NAA+PROBE: NEGATIVE
FLUBV RNA RESP QL NAA+PROBE: NEGATIVE
GFR SERPL CREATININE-BSD FRML MDRD: 72 ML/MIN/1.73SQ M
GLUCOSE SERPL-MCNC: 108 MG/DL (ref 65–140)
HCT VFR BLD AUTO: 44.4 % (ref 36.5–49.3)
HGB BLD-MCNC: 14.8 G/DL (ref 12–17)
IMM GRANULOCYTES # BLD AUTO: 0.03 THOUSAND/UL (ref 0–0.2)
IMM GRANULOCYTES NFR BLD AUTO: 0 % (ref 0–2)
INR PPP: 0.9 (ref 0.84–1.19)
LYMPHOCYTES # BLD AUTO: 1.05 THOUSANDS/ÂΜL (ref 0.6–4.47)
LYMPHOCYTES NFR BLD AUTO: 15 % (ref 14–44)
MCH RBC QN AUTO: 30 PG (ref 26.8–34.3)
MCHC RBC AUTO-ENTMCNC: 33.3 G/DL (ref 31.4–37.4)
MCV RBC AUTO: 90 FL (ref 82–98)
MONOCYTES # BLD AUTO: 1.2 THOUSAND/ÂΜL (ref 0.17–1.22)
MONOCYTES NFR BLD AUTO: 17 % (ref 4–12)
NEUTROPHILS # BLD AUTO: 4.56 THOUSANDS/ÂΜL (ref 1.85–7.62)
NEUTS SEG NFR BLD AUTO: 67 % (ref 43–75)
NRBC BLD AUTO-RTO: 0 /100 WBCS
P AXIS: 25 DEGREES
P AXIS: 26 DEGREES
P AXIS: 30 DEGREES
PLATELET # BLD AUTO: 166 THOUSANDS/UL (ref 149–390)
PMV BLD AUTO: 10.5 FL (ref 8.9–12.7)
POTASSIUM SERPL-SCNC: 4.1 MMOL/L (ref 3.5–5.3)
PR INTERVAL: 186 MS
PR INTERVAL: 192 MS
PR INTERVAL: 202 MS
PROT SERPL-MCNC: 7.3 G/DL (ref 6.4–8.4)
PROTHROMBIN TIME: 12.7 SECONDS (ref 11.6–14.5)
QRS AXIS: 34 DEGREES
QRS AXIS: 36 DEGREES
QRS AXIS: 46 DEGREES
QRSD INTERVAL: 90 MS
QRSD INTERVAL: 90 MS
QRSD INTERVAL: 92 MS
QT INTERVAL: 390 MS
QT INTERVAL: 394 MS
QT INTERVAL: 410 MS
QTC INTERVAL: 399 MS
QTC INTERVAL: 402 MS
QTC INTERVAL: 410 MS
RBC # BLD AUTO: 4.93 MILLION/UL (ref 3.88–5.62)
RSV RNA RESP QL NAA+PROBE: NEGATIVE
SARS-COV-2 RNA RESP QL NAA+PROBE: POSITIVE
SODIUM SERPL-SCNC: 135 MMOL/L (ref 135–147)
T WAVE AXIS: 11 DEGREES
T WAVE AXIS: 16 DEGREES
T WAVE AXIS: 20 DEGREES
VENTRICULAR RATE: 60 BPM
VENTRICULAR RATE: 62 BPM
VENTRICULAR RATE: 64 BPM
WBC # BLD AUTO: 6.91 THOUSAND/UL (ref 4.31–10.16)

## 2024-03-06 PROCEDURE — 99285 EMERGENCY DEPT VISIT HI MDM: CPT | Performed by: EMERGENCY MEDICINE

## 2024-03-06 PROCEDURE — 84484 ASSAY OF TROPONIN QUANT: CPT

## 2024-03-06 PROCEDURE — 99285 EMERGENCY DEPT VISIT HI MDM: CPT

## 2024-03-06 PROCEDURE — 85025 COMPLETE CBC W/AUTO DIFF WBC: CPT

## 2024-03-06 PROCEDURE — 96374 THER/PROPH/DIAG INJ IV PUSH: CPT

## 2024-03-06 PROCEDURE — 36415 COLL VENOUS BLD VENIPUNCTURE: CPT

## 2024-03-06 PROCEDURE — 93010 ELECTROCARDIOGRAM REPORT: CPT | Performed by: INTERNAL MEDICINE

## 2024-03-06 PROCEDURE — 85610 PROTHROMBIN TIME: CPT

## 2024-03-06 PROCEDURE — 71275 CT ANGIOGRAPHY CHEST: CPT

## 2024-03-06 PROCEDURE — 93005 ELECTROCARDIOGRAM TRACING: CPT

## 2024-03-06 PROCEDURE — 83880 ASSAY OF NATRIURETIC PEPTIDE: CPT

## 2024-03-06 PROCEDURE — 80076 HEPATIC FUNCTION PANEL: CPT

## 2024-03-06 PROCEDURE — 85730 THROMBOPLASTIN TIME PARTIAL: CPT

## 2024-03-06 PROCEDURE — 80048 BASIC METABOLIC PNL TOTAL CA: CPT

## 2024-03-06 PROCEDURE — 0241U HB NFCT DS VIR RESP RNA 4 TRGT: CPT

## 2024-03-06 RX ORDER — KETOROLAC TROMETHAMINE 30 MG/ML
15 INJECTION, SOLUTION INTRAMUSCULAR; INTRAVENOUS ONCE
Status: COMPLETED | OUTPATIENT
Start: 2024-03-06 | End: 2024-03-06

## 2024-03-06 RX ADMIN — KETOROLAC TROMETHAMINE 15 MG: 30 INJECTION, SOLUTION INTRAMUSCULAR; INTRAVENOUS at 03:24

## 2024-03-06 RX ADMIN — IOHEXOL 85 ML: 350 INJECTION, SOLUTION INTRAVENOUS at 01:33

## 2024-03-06 NOTE — ED ATTENDING ATTESTATION
3/6/2024  IPatsy MD, saw and evaluated the patient. I have discussed the patient with the resident/non-physician practitioner and agree with the resident's/non-physician practitioner's findings, Plan of Care, and MDM as documented in the resident's/non-physician practitioner's note, except where noted. All available labs and Radiology studies were reviewed.  I was present for key portions of any procedure(s) performed by the resident/non-physician practitioner and I was immediately available to provide assistance.       At this point I agree with the current assessment done in the Emergency Department.  I have conducted an independent evaluation of this patient a history and physical is as follows:    68-year-old presented to the ER with left-sided sharp chest pain.  Worse with deep breathing.  Worse with movement.  Tender to touch.  Started 2 hours ago.  History of blood clots.  Stopped taking anticoagulation due to recent nasal congestion and bleeding from the nose.  No leg pain or swelling.  No history of cardiac disease.  No fevers or chills.    Exam without significant abnormality except for mild left-sided chest tenderness.    Agree with cardiac evaluation, PE study as patient is high risk.  Viral swab.    Delta troponin normal.  EKG without significant abnormality.  Patient's heart score between 4 and 6, will have stat follow-up with cardiology outpatient.  Referral sent    ED Course         Critical Care Time  Procedures

## 2024-03-06 NOTE — Clinical Note
Augusto Momin was seen and treated in our emergency department on 3/6/2024.                Diagnosis:     Augusto  .    He may return on this date: 03/09/2024         If you have any questions or concerns, please don't hesitate to call.      Chanelle Frost MD    ______________________________           _______________          _______________  Hospital Representative                              Date                                Time

## 2024-03-06 NOTE — Clinical Note
accompanied Augusto Momin to the emergency department on 3/6/2024.    Return date if applicable:         If you have any questions or concerns, please don't hesitate to call.      Chanelle Frost MD

## 2024-03-06 NOTE — DISCHARGE INSTRUCTIONS
You were evaluated in the emergency department for: Chest pain. You can access your current and pending results through [employer] [employer]. A radiologist will take a second look at your X-Rays, if you had any, and you will be contacted with any new findings.     - You should follow-up with your primary care provider, as soon as possible, for re-evaluation.  - You have been referred to cardiology, their contact information is also on this sheet.    Please, return to the emergency department if you experience new or worsening symptoms, fever, chest pain, shortness of breath, difficulty breathing, dizziness, abdominal pain, persistent nausea/vomiting, syncope or passing out, blood in your urine or stool, coughing up blood, leg swelling/pain, urinary retention, bowel or bladder incontinence, numbness between your legs.

## 2024-03-06 NOTE — ED PROVIDER NOTES
History  Chief Complaint   Patient presents with    Chest Pain     Pt c/o left sided CP starting 2 hours ago, +SOB +dizziness     HPI    69 yo male with history of previous DVT and PE, Asthma, HLD, HTN, presenting for chest pain.    Patient reports sudden onset 10/10 sharp chest pain located on the left side of his chest, radiating up to his neck. Patient reports nausea without vomiting, lightheadedness, mild shortness of breath. Denies vision changes, focal neurologic deficits, back pain, abdominal pain, rashes. Patient reports history of DVT and PE, and reports that this feels like his last PE. Patient had been on long term anticoagulation but stopped the last week as he had a cold with cough, congestion, subjective fevers, and rhinorrhea which resulted in repeated nose bleeds. Has been able to eat and drink normally.    Prior to Admission Medications   Prescriptions Last Dose Informant Patient Reported? Taking?   Ascorbic Acid (VITAMIN C PO)  Self Yes No   Sig: Take by mouth   CALCIUM ASCORBATE PO  Self Yes No   Sig: Take by mouth   Calcium Polycarbophil (FIBER-CAPS PO)  Self Yes No   Sig: Take by mouth   Omega-3 Fatty Acids (FISH OIL) 1,000 mg  Self Yes No   Sig: Take 1,000 mg by mouth daily   Probiotic Product (PROBIOTIC DAILY PO)  Self Yes No   Sig: Take by mouth   Turmeric (QC TUMERIC COMPLEX PO)  Self Yes No   Sig: Take by mouth   VITAMIN D PO  Self Yes No   Sig: Take by mouth   albuterol (VENTOLIN HFA) 90 mcg/act inhaler  Self No No   Sig: Inhale 2 puffs every 6 (six) hours as needed for wheezing   colestipol (COLESTID) 1 g tablet  Self Yes No   Sig: Take 1 g by mouth 2 (two) times a day   Patient not taking: Reported on 2021   cycloSPORINE (RESTASIS) 0.05 % ophthalmic emulsion  Self Yes No   Sig: Restasis 0.05 % eye drops in a dropperette   cyclobenzaprine (FLEXERIL) 5 mg tablet   No No   Si-2 PO TID PRN   methocarbamol (ROBAXIN) 500 mg tablet   No No   Sig: Take 1 tablet (500 mg total) by mouth 2  (two) times a day   Patient not taking: Reported on 9/9/2021   methocarbamol (ROBAXIN) 500 mg tablet   No No   Sig: Take 1 tablet (500 mg total) by mouth 2 (two) times a day as needed for muscle spasms   methylPREDNISolone 4 MG tablet therapy pack   No No   Sig: Use as directed on package   multivitamin (THERAGRAN) TABS  Self Yes No   Sig: Take 1 tablet by mouth daily   oxyCODONE-acetaminophen (PERCOCET) 5-325 mg per tablet   No No   Sig: Take 1 tablet by mouth every 4 (four) hours as needed for moderate pain for up to 10 dosesMax Daily Amount: 6 tablets   rivaroxaban (XARELTO) 10 mg tablet  Self No No   Sig: Take 1 tablet (10 mg total) by mouth daily   rosuvastatin (CRESTOR) 10 MG tablet   Yes No   Sig: rosuvastatin 10 mg tablet   TAKE 1 TABLET BY MOUTH EVERY DAY AT BEDTIME   senna (Senna-Time) 8.6 MG tablet   Yes No   Sig: Daily   Patient not taking: Reported on 9/9/2021   sertraline (ZOLOFT) 25 mg tablet   Yes No   Sig: sertraline 25 mg tablet   take 1 tablet by mouth once daily   Patient not taking: Reported on 9/9/2021      Facility-Administered Medications: None       Past Medical History:   Diagnosis Date    Asthma     Colon polyp     CPAP (continuous positive airway pressure) dependence     DVT (deep venous thrombosis) (HCC)     LLE    Hyperlipidemia     Hypertension     Kidney stone     Pulmonary emboli (HCC)     Sleep apnea        Past Surgical History:   Procedure Laterality Date    COLONOSCOPY      HEMORRHOID SURGERY N/A 9/29/2020    Procedure: HEMORRHOIDECTOMY EXCISION examination under anesthesia;  Surgeon: Clint Rea MD;  Location[facility];  Service: General    ROTATOR CUFF REPAIR      SINUS SURGERY         Family History   Problem Relation Age of Onset    No Known Problems Mother     Colon cancer Father 73     I have reviewed and agree with the history as documented.    E-Cigarette/Vaping    E-Cigarette Use Never User      E-Cigarette/Vaping Substances    Nicotine No     THC No     CBD No      Flavoring No     Other No     Unknown No      Social History     Tobacco Use    Smoking status: Former     Current packs/day: 0.00     Average packs/day: 1 pack/day for 15.0 years (15.0 ttl pk-yrs)     Types: Cigarettes, Cigars     Start date:      Quit date:      Years since quittin.1    Smokeless tobacco: Never   Vaping Use    Vaping status: Never Used   Substance Use Topics    Alcohol use: Yes     Comment: rare    Drug use: Never        Review of Systems   Constitutional:  Positive for chills and fever.   HENT:  Negative for congestion, rhinorrhea and sore throat.    Eyes:  Negative for pain and visual disturbance.   Respiratory:  Negative for cough, chest tightness, shortness of breath, wheezing and stridor.    Cardiovascular:  Positive for chest pain. Negative for palpitations and leg swelling.   Gastrointestinal:  Positive for nausea. Negative for abdominal pain, constipation, diarrhea and vomiting.   Genitourinary:  Negative for decreased urine volume, difficulty urinating, dysuria and hematuria.   Musculoskeletal:  Positive for neck pain. Negative for arthralgias and back pain.   Skin:  Negative for color change, pallor and rash.   Neurological:  Positive for weakness (generalized), light-headedness and headaches. Negative for dizziness, syncope and numbness.   Psychiatric/Behavioral:  Negative for behavioral problems.    All other systems reviewed and are negative.      Physical Exam  ED Triage Vitals [24 0029]   Temperature Pulse Respirations Blood Pressure SpO2   97.7 °F (36.5 °C) 57 18 (!) 182/81 99 %      Temp Source Heart Rate Source Patient Position - Orthostatic VS BP Location FiO2 (%)   Oral Monitor -- Right arm --      Pain Score       4             Orthostatic Vital Signs  Vitals:    24 0029 24 0200   BP: (!) 182/81 165/87   Pulse: 57 64       Physical Exam  Vitals and nursing note reviewed.   Constitutional:       Appearance: Normal appearance. He is well-developed.  He is toxic-appearing.   HENT:      Head: Normocephalic and atraumatic.      Nose: Congestion and rhinorrhea present.      Mouth/Throat:      Mouth: Mucous membranes are moist.      Pharynx: Oropharynx is clear. No oropharyngeal exudate.   Eyes:      Extraocular Movements: Extraocular movements intact.      Conjunctiva/sclera: Conjunctivae normal.      Pupils: Pupils are equal, round, and reactive to light.   Cardiovascular:      Rate and Rhythm: Normal rate and regular rhythm.      Pulses: Normal pulses.      Heart sounds: Normal heart sounds. No murmur heard.  Pulmonary:      Effort: Pulmonary effort is normal. No respiratory distress.      Breath sounds: Normal breath sounds. No wheezing, rhonchi or rales.   Abdominal:      General: Abdomen is flat. Bowel sounds are normal.      Palpations: Abdomen is soft.      Tenderness: There is no abdominal tenderness.   Musculoskeletal:      Cervical back: Neck supple.      Right lower leg: No edema.      Left lower leg: No edema.   Skin:     General: Skin is warm and dry.      Capillary Refill: Capillary refill takes less than 2 seconds.   Neurological:      General: No focal deficit present.      Mental Status: He is alert and oriented to person, place, and time.   Psychiatric:         Mood and Affect: Mood normal.         Behavior: Behavior normal.         ED Medications  Medications   iohexol (OMNIPAQUE) 350 MG/ML injection (MULTI-DOSE) 85 mL (85 mL Intravenous Given 3/6/24 0133)   ketorolac (TORADOL) injection 15 mg (15 mg Intravenous Given 3/6/24 0324)       Diagnostic Studies  Results Reviewed       Procedure Component Value Units Date/Time    HS Troponin I 2hr [[id number]]  (Normal) Collected: 03/06/24 0249    Lab Status: Final result Specimen: Blood from Arm, Right Updated: 03/06/24 0359     hs TnI 2hr 2 ng/L      Delta 2hr hsTnI -1 ng/L     B-Type Natriuretic Peptide(BNP) [[id number]]  (Normal) Collected: 03/06/24 0053    Lab Status: Final result Specimen: Blood  from Arm, Right Updated: 03/06/24 0141     BNP 23 pg/mL     FLU/RSV/COVID - if FLU/RSV clinically relevant [[id number]]  (Abnormal) Collected: 03/06/24 0053    Lab Status: Final result Specimen: Nares from Nose Updated: 03/06/24 0135     SARS-CoV-2 Positive     INFLUENZA A PCR Negative     INFLUENZA B PCR Negative     RSV PCR Negative    Narrative:      FOR PEDIATRIC PATIENTS - copy/paste COVID Guidelines URL to browser: [email or web address]    SARS-CoV-2 assay is a Nucleic Acid Amplification assay intended for the  qualitative detection of nucleic acid from SARS-CoV-2 in nasopharyngeal  swabs. Results are for the presumptive identification of SARS-CoV-2 RNA.    Positive results are indicative of infection with SARS-CoV-2, the virus  causing COVID-19, but do not rule out bacterial infection or co-infection  with other viruses. Laboratories within the [place] and its  territories are required to report all positive results to the appropriate  public health authorities. Negative results do not preclude SARS-CoV-2  infection and should not be used as the sole basis for treatment or other  patient management decisions. Negative results must be combined with  clinical observations, patient history, and epidemiological information.  This test has not been FDA cleared or approved.    This test has been authorized by FDA under an Emergency Use Authorization  (EUA). This test is only authorized for the duration of time the  declaration that circumstances exist justifying the authorization of the  emergency use of an in vitro diagnostic tests for detection of SARS-CoV-2  virus and/or diagnosis of COVID-19 infection under section 564(b)(1) of  the Act, 21 U.S.C. 360bbb-3(b)(1), unless the authorization is terminated  or revoked sooner. The test has been validated but independent review by FDA  and CLIA is pending.    Test performed using [employer]: This RT-PCR  assay targets N2,  a region unique to SARS-CoV-2. A conserved region in the E-gene was chosen  for pan-Sarbecovirus detection which includes SARS-CoV-2.    According to CMS-2020-01-R, this platform meets the definition of high-throughput technology.    HS Troponin 0hr (reflex protocol) [[id number]]  (Normal) Collected: 03/06/24 0053    Lab Status: Final result Specimen: Blood from Arm, Right Updated: 03/06/24 0121     hs TnI 0hr 3 ng/L     Basic metabolic panel [[id number]] Collected: 03/06/24 0053    Lab Status: Final result Specimen: Blood from Arm, Right Updated: 03/06/24 0117     Sodium 135 mmol/L      Potassium 4.1 mmol/L      Chloride 100 mmol/L      CO2 26 mmol/L      ANION GAP 9 mmol/L      BUN 23 mg/dL      Creatinine 1.06 mg/dL      Glucose 108 mg/dL      Calcium 10.0 mg/dL      eGFR 72 ml/min/1.73sq m     Narrative:      National Kidney Disease Foundation guidelines for Chronic Kidney Disease (CKD):     Stage 1 with normal or high GFR (GFR > 90 mL/min/1.73 square meters)    Stage 2 Mild CKD (GFR = 60-89 mL/min/1.73 square meters)    Stage 3A Moderate CKD (GFR = 45-59 mL/min/1.73 square meters)    Stage 3B Moderate CKD (GFR = 30-44 mL/min/1.73 square meters)    Stage 4 Severe CKD (GFR = 15-29 mL/min/1.73 square meters)    Stage 5 End Stage CKD (GFR <15 mL/min/1.73 square meters)  Note: GFR calculation is accurate only with a steady state creatinine    Hepatic function panel [[id number]]  (Normal) Collected: 03/06/24 0053    Lab Status: Final result Specimen: Blood from Arm, Right Updated: 03/06/24 0117     Total Bilirubin 0.37 mg/dL      Bilirubin, Direct 0.07 mg/dL      Alkaline Phosphatase 80 U/L      AST 17 U/L      ALT 15 U/L      Total Protein 7.3 g/dL      Albumin 4.2 g/dL     Protime-INR [[id number]]  (Normal) Collected: 03/06/24 0053    Lab Status: Final result Specimen: Blood from Arm, Right Updated: 03/06/24 0116     Protime 12.7 seconds      INR 0.90    APTT [[id number]]  (Normal) Collected: 03/06/24  0053    Lab Status: Final result Specimen: Blood from Arm, Right Updated: 03/06/24 0116     PTT 27 seconds     CBC and differential [[id number]]  (Abnormal) Collected: 03/06/24 0053    Lab Status: Final result Specimen: Blood from Arm, Right Updated: 03/06/24 0059     WBC 6.91 Thousand/uL      RBC 4.93 Million/uL      Hemoglobin 14.8 g/dL      Hematocrit 44.4 %      MCV 90 fL      MCH 30.0 pg      MCHC 33.3 g/dL      RDW 12.2 %      MPV 10.5 fL      Platelets 166 Thousands/uL      nRBC 0 /100 WBCs      Neutrophils Relative 67 %      Immat GRANS % 0 %      Lymphocytes Relative 15 %      Monocytes Relative 17 %      Eosinophils Relative 1 %      Basophils Relative 0 %      Neutrophils Absolute 4.56 Thousands/µL      Immature Grans Absolute 0.03 Thousand/uL      Lymphocytes Absolute 1.05 Thousands/µL      Monocytes Absolute 1.20 Thousand/µL      Eosinophils Absolute 0.04 Thousand/µL      Basophils Absolute 0.03 Thousands/µL                    CTA ED chest PE study   Final Result by Tom Whalen MD (03/06 0214)      No evidence of acute pulmonary embolus, thoracic aortic aneurysm or dissection. No acute cardiopulmonary process.                  Workstation performed: [id number]               Procedures  ECG 12 Lead Documentation Only    Date/Time: 3/6/2024 12:56 AM    Performed by: Chanelle Frost MD  Authorized by: Chanelle Frost MD    Indications / Diagnosis:  Chest pain  ECG reviewed by me, the ED Provider: yes    Patient location:  ED  Previous ECG:     Previous ECG:  Compared to current    Comparison ECG info:  03/23/2024    Similarity:  Changes noted    Comparison to cardiac monitor: Yes    Interpretation:     Interpretation: non-specific    Rate:     ECG rate:  64    ECG rate assessment: normal    Rhythm:     Rhythm: sinus rhythm    Ectopy:     Ectopy: none    QRS:     QRS axis:  Normal    QRS intervals:  Normal  Conduction:     Conduction: normal    ST segments:     ST segments:  Non-specific  T  waves:     T waves: non-specific and inverted      Inverted:  III  Comments:      QTc 402  ECG 12 Lead Documentation Only    Date/Time: 3/6/2024 2:52 AM    Performed by: Chanelle Frost MD  Authorized by: Chanelle Frost MD    Indications / Diagnosis:  Chest pain  ECG reviewed by me, the ED Provider: yes    Patient location:  ED  Previous ECG:     Previous ECG:  Compared to current    Comparison ECG info:  03/06/2024 0039    Similarity:  No change    Comparison to cardiac monitor: Yes    Interpretation:     Interpretation: non-specific    Rate:     ECG rate:  60    ECG rate assessment: normal    Rhythm:     Rhythm: sinus bradycardia    Ectopy:     Ectopy: none    QRS:     QRS axis:  Normal    QRS intervals:  Normal  Conduction:     Conduction: normal    ST segments:     ST segments:  Non-specific  T waves:     T waves: non-specific and inverted      Inverted:  III  Comments:      QTc 410        ED Course  ED Course as of 03/06/24 0710   Wed Mar 06, 2024   0052 Blood Pressure(!): 182/81  182/81, HR 57, RR 18, SpO2 99% RA, 97.7 F   0053 SpO2: 99 %  Saturating well on room air   0053 Respirations: 18  Not tachypneic    0053 Pulse: 57  Not tachycardic   0100 CBC and differential(!)  Grossly normal   0129 Basic metabolic panel  wnl   0129 Hepatic function panel  wnl   0130 hs TnI 0hr: 3  Will need second troponin as onset <3 hr   0130 Protime-INR   0130 APTT   0135 SARS-COV-2(!): Positive  COVID positive   0144 BNP: 23   0232 Blood Pressure: 165/87  165/87, HR 64, SPO2 97% RA   0233 CTA ED chest PE study  IMPRESSION:     No evidence of acute pulmonary embolus, thoracic aortic aneurysm or dissection. No acute cardiopulmonary process.     0405 hs TnI 2hr: 2  Two hr 2, delta -1, unlikely to be ACS at this time             HEART Risk Score      Flowsheet Row Most Recent Value   Heart Score Risk Calculator    History 0 Filed at: 03/06/2024 0101   ECG 1 Filed at: 03/06/2024 0101   Age 2 Filed at: 03/06/2024 0101   Risk  Factors 1 Filed at: 03/06/2024 0101   Troponin 0 Filed at: 03/06/2024 0101   HEART Score 4 Filed at: 03/06/2024 0101                            Wells' Criteria for PE      Flowsheet Row Most Recent Value   Wells' Criteria for PE    Clinical signs and symptoms of DVT 0 Filed at: 03/06/2024 0045   PE is primary diagnosis or equally likely 3 Filed at: 03/06/2024 0045   HR >100 0 Filed at: 03/06/2024 0045   Immobilization at least 3 days or Surgery in the previous 4 weeks 0 Filed at: 03/06/2024 0045   Previous, objectively diagnosed PE or DVT 1.5 Filed at: 03/06/2024 0045   Hemoptysis 0 Filed at: 03/06/2024 0045   Malignancy with treatment within 6 months or palliative 0 Filed at: 03/06/2024 0045   Wells' Criteria Total 4.5 Filed at: 03/06/2024 0045              Medical Decision Making  Initial ED assessment:  67 yo male with history of previous DVT and PE, Asthma, HLD, HTN, presenting for sudden onset 10/10 sharp chest pain located on the left side of his chest, radiating up to his neck about 2 hours prior to presentation, nausea without vomiting, lightheadedness, mild shortness of breath. Denies vision changes, focal neurologic deficits, back pain, abdominal pain, rashes. Reports history of DVT and PE, and reports this feels like his last PE. Stopped long term Xarelto last week as he had a cold with cough, congestion, subjective fevers, and rhinorrhea which resulted in repeated nose bleeds.    In the ED, patient is afebrile, vital signs showing elevated blood pressure, otherwise within normal limits.  On exam patient is alert and oriented. Heart regular rate and rhythm. Lungs clear bilaterally. Abdomen soft, non-tender, non-distended, normal bowel sounds. Pulses, sensation and strength intact in all four extremities. No peripheral edema. No calf tenderness, no erythema.    Initial DDx includes but is not limited to: ACS, MI, PE, PTX, pneumonia, dissection, pleurisy, pericarditis, myocarditis, GI etiology.    CBC  Grossly normal, no leukocytosis, no anemia, normal platelets  CMP, hepatic function panel also normal.  COVID positive.    CTA PE study showing no acute PE, no thoracic aneurysm or dissection, no acute cardiopulmonary process.    Suspect patient's symptoms are due to COVID, component of pleurisy. Discussed treatments for home.    Discussed elevated HEART score and need to follow-up with cardiology for stress test. Will give referral.    See ED Course for further updates.    Final ED summary/disposition: After evaluation and workup in the emergency department, patient appears improved, expresses understanding and agrees with plan of care at this time.  In light of this patient would benefit from outpatient management.  Discussed risk of cardiac event, and referred to cardiology for follow-up. All questions answered.  Discussed all results with patient including lab work, imaging, and evaluation.  Discussed strict return precautions.  Discussed importance of following up with PCP in the next few days.  All questions answered.  Patient is agreeable to discharge.    Amount and/or Complexity of Data Reviewed  Independent Historian: derick  External Data Reviewed: labs, radiology, ECG and notes.  Labs: ordered. Decision-making details documented in ED Course.  Radiology: ordered. Decision-making details documented in ED Course.  ECG/medicine tests: ordered. Decision-making details documented in ED Course.    Risk  OTC drugs.  Prescription drug management.          Disposition  Final diagnoses:   COVID-19 virus infection   Chest pain     Time reflects when diagnosis was documented in both MDM as applicable and the Disposition within this note       Time User Action Codes Description Comment    3/6/2024  1:36 AM Chanelle Frost Add [U07.1] COVID-19 virus infection     3/6/2024  1:36 AM Chanelle Frost Modify [U07.1] COVID-19 virus infection     3/6/2024  4:06 AM Chanelle Frost Add [R07.9] Chest pain           ED  Disposition       ED Disposition   Discharge    Condition   Stable    Date/Time   Wed Mar 6, 2024 0252    Comment   Augusto Momin discharge to home/self care.                   Follow-up Information       Follow up With Specialties Details Why Contact Info Additional Information    Gerard Hollins MD Internal Medicine Go to  Re-evaluation of symptoms [address] [facility] [address]  [phone]       [facility] Emergency Department Emergency Medicine Go to  As needed, If symptoms worsen [address] [facility] [address]  [phone] [facility] Emergency Department, [address] [place], [place], [address]    [facility] Cardiology [facility] Cardiology Go to  As needed [address]  [facility] [address] [phone] [facility] Cardiology [facility], [address], [place], [id number] [phone]            Discharge Medication List as of 3/6/2024  4:09 AM        CONTINUE these medications which have NOT CHANGED    Details   albuterol (VENTOLIN HFA) 90 mcg/act inhaler Inhale 2 puffs every 6 (six) hours as needed for wheezing, Starting Wed 1/29/2020, Normal      Ascorbic Acid (VITAMIN C PO) Take by mouth, Historical Med      CALCIUM ASCORBATE PO Take by mouth, Historical Med      Calcium Polycarbophil (FIBER-CAPS PO) Take by mouth, Historical Med      colestipol (COLESTID) 1 g tablet Take 1 g by mouth 2 (two) times a day, Historical Med      cyclobenzaprine (FLEXERIL) 5 mg tablet 1-2 PO TID PRN, Normal      cycloSPORINE (RESTASIS) 0.05 % ophthalmic emulsion Restasis 0.05 % eye drops in a dropperette, Historical Med      !! methocarbamol (ROBAXIN) 500 mg tablet Take 1 tablet (500 mg total) by mouth 2 (two) times a day, Starting Mon 4/19/2021, Normal      !! methocarbamol (ROBAXIN) 500 mg tablet Take 1 tablet (500 mg total) by mouth 2 (two) times a day as needed for muscle spasms, Starting Thu  9/9/2021, Normal      methylPREDNISolone 4 MG tablet therapy pack Use as directed on package, Normal      multivitamin (THERAGRAN) TABS Take 1 tablet by mouth daily, Historical Med      Omega-3 Fatty Acids (FISH OIL) 1,000 mg Take 1,000 mg by mouth daily, Historical Med      oxyCODONE-acetaminophen (PERCOCET) 5-325 mg per tablet Take 1 tablet by mouth every 4 (four) hours as needed for moderate pain for up to 10 dosesMax Daily Amount: 6 tablets, Starting Thu 9/9/2021, Normal      Probiotic Product (PROBIOTIC DAILY PO) Take by mouth, Historical Med      rivaroxaban (XARELTO) 10 mg tablet Take 1 tablet (10 mg total) by mouth daily, Starting Mon 11/2/2020, Normal      rosuvastatin (CRESTOR) 10 MG tablet rosuvastatin 10 mg tablet   TAKE 1 TABLET BY MOUTH EVERY DAY AT BEDTIME, Historical Med      senna (Senna-Time) 8.6 MG tablet Daily, Starting Sat 2/27/2021, Historical Med      sertraline (ZOLOFT) 25 mg tablet sertraline 25 mg tablet   take 1 tablet by mouth once daily, Historical Med      Turmeric (QC TUMERIC COMPLEX PO) Take by mouth, Historical Med      VITAMIN D PO Take by mouth, Historical Med       !! - Potential duplicate medications found. Please discuss with provider.            PDMP Review         Value Time User    PDMP Reviewed  Yes 9/9/2021  6:57 PM Jamie Friend DO             ED Provider  Attending physically available and evaluated Augusto Momin. I managed the patient along with the ED Attending.    Electronically Signed by           Chanelle Frost MD  03/06/24 9212

## 2024-03-18 ENCOUNTER — TELEPHONE (OUTPATIENT)
Age: 69
End: 2024-03-18

## 2024-03-18 ENCOUNTER — TELEPHONE (OUTPATIENT)
Dept: HEMATOLOGY ONCOLOGY | Facility: CLINIC | Age: 69
End: 2024-03-18

## 2024-03-18 NOTE — TELEPHONE ENCOUNTER
Patient called in to establish care with Hematology. This is a self referral. I scheduled patient with 4/30/24 at 9am with Nuvia Gatica at [facility] [place]. I also gave patient hopeline fax number.

## 2024-03-18 NOTE — TELEPHONE ENCOUNTER
Patient called in to confirm an appointment on 3/20/24 patient stated he received a text message . Patient does not have any upcoming appt for that date. I asked the patient to read the text message to me . Patient schedule with [facility] not [facility]

## 2024-04-11 ENCOUNTER — OFFICE VISIT (OUTPATIENT)
Dept: CARDIOLOGY CLINIC | Facility: CLINIC | Age: 69
End: 2024-04-11
Payer: COMMERCIAL

## 2024-04-11 VITALS
OXYGEN SATURATION: 95 % | WEIGHT: 174 LBS | SYSTOLIC BLOOD PRESSURE: 116 MMHG | HEART RATE: 71 BPM | BODY MASS INDEX: 25.7 KG/M2 | DIASTOLIC BLOOD PRESSURE: 80 MMHG

## 2024-04-11 DIAGNOSIS — R07.89 ATYPICAL CHEST PAIN: ICD-10-CM

## 2024-04-11 DIAGNOSIS — I10 PRIMARY HYPERTENSION: Primary | ICD-10-CM

## 2024-04-11 DIAGNOSIS — D68.51 FACTOR V LEIDEN CARRIER (HCC): ICD-10-CM

## 2024-04-11 DIAGNOSIS — E78.00 PURE HYPERCHOLESTEROLEMIA: ICD-10-CM

## 2024-04-11 DIAGNOSIS — R94.31 ABNORMAL EKG: ICD-10-CM

## 2024-04-11 DIAGNOSIS — R06.09 DYSPNEA ON EXERTION: ICD-10-CM

## 2024-04-11 PROCEDURE — 99203 OFFICE O/P NEW LOW 30 MIN: CPT | Performed by: INTERNAL MEDICINE

## 2024-04-11 NOTE — PROGRESS NOTES
Cardiology Follow Up    Augusto Momin  1955  [id number]  [facility] CARDIAC CATH LAB  [address] [place] [address]  [phone]    1. Primary hypertension  Echo complete w/ contrast if indicated      2. Factor V Leiden carrier (HCC)        3. Atypical chest pain  Stress test only, exercise    Echo complete w/ contrast if indicated      4. Dyspnea on exertion  Stress test only, exercise    Echo complete w/ contrast if indicated      5. Pure hypercholesterolemia  Lipid Panel with Direct LDL reflex      6. Abnormal EKG            Interval History: Cardiology consultation.  68-year-old male who has no previous cardiac history.  The patient was seen in the emergency room on 3/24 with upper respiratory symptoms as well as chest discomfort with some pleuritic component.  He was positive for COVID-19 infection.  He did not require hospitalization, he was not hypoxic.  EKG personally reviewed revealed normal sinus rhythm with minor T wave changes nonspecific in lead III, as well as a prominent Q-wave in lead III..  Troponins were unremarkable. he had recovered from that.  CTA of the chest, personally reviewed revealed no evidence of pulmonary embolism.  An aberrant right subclavian artery was noted.  I do not identify any significant coronary calcium.  Patient's history of DVT back in 2019 for which she was put on chronic anticoagulation with factor Xa inhibitor.  He had stopped it a week prior to his emergency room visit because of the symptoms.  History of hypercoagulable syndrome with heterozygous  factor V Leiden.  The patient had a office visit by cardiologist 4 years ago and echocardiogram at that time revealed normal left ventricular function with mild mitral and tricuspid sufficiency, estimated normal pulmonary pressures suggested to criteria.  Stress test was ordered at that time, this was not performed.  Patient suffers from  dyslipidemia, previous LDL as high as 187.  Last LDL was checked 117, on medium intensity statin therapy, he is no longer taking.  No particular reason for that, he tells me he was told his lipids improved.  There is no family history of premature coronary disease, there patient is non-smoker.  Sedentary lifestyle.    Patient Active Problem List   Diagnosis    Pulmonary emboli (HCC)    Acute deep vein thrombosis (DVT) of left lower extremity (HCC)    Hypertension    Hyperlipidemia    Atypical chest pain    Obstructive sleep apnea    Dyspnea on exertion    Swelling of lower extremity    Anticoagulant long-term use    History of colon polyps    Hemorrhoids    Rectal pain    Overweight with body mass index (BMI) 25.0-29.9    Neuropathy    Factor V Leiden carrier (HCC)    Abnormal EKG     Past Medical History:   Diagnosis Date    Asthma     Colon polyp     CPAP (continuous positive airway pressure) dependence     DVT (deep venous thrombosis) (HCC)     LLE    Hyperlipidemia     Hypertension     Kidney stone     Pulmonary emboli (HCC)     Sleep apnea      Social History     Socioeconomic History    Marital status: /Civil Union     Spouse name: Not on file    Number of children: Not on file    Years of education: Not on file    Highest education level: Not on file   Occupational History    Not on file   Tobacco Use    Smoking status: Former     Current packs/day: 0.00     Average packs/day: 1 pack/day for 15.0 years (15.0 ttl pk-yrs)     Types: Cigarettes, Cigars     Start date:      Quit date:      Years since quittin.2    Smokeless tobacco: Never   Vaping Use    Vaping status: Never Used   Substance and Sexual Activity    Alcohol use: Yes     Comment: rare    Drug use: Never    Sexual activity: Not on file   Other Topics Concern    Not on file   Social History Narrative    Not on file     Social Determinants of Health     Financial Resource Strain: Not on file   Food Insecurity: Not on file    Transportation Needs: Not on file   Physical Activity: Not on file   Stress: Not on file   Social Connections: Not on file   Intimate Partner Violence: Not on file   Housing Stability: Not on file      Family History   Problem Relation Age of Onset    No Known Problems Mother     Colon cancer Father 73     Past Surgical History:   Procedure Laterality Date    COLONOSCOPY      HEMORRHOID SURGERY N/A 9/29/2020    Procedure: HEMORRHOIDECTOMY EXCISION examination under anesthesia;  Surgeon: Clint Rea MD;  Location: [facility];  Service: General    ROTATOR CUFF REPAIR      SINUS SURGERY         Current Outpatient Medications:     Ascorbic Acid (VITAMIN C PO), Take by mouth, Disp: , Rfl:     CALCIUM ASCORBATE PO, Take by mouth, Disp: , Rfl:     cycloSPORINE (RESTASIS) 0.05 % ophthalmic emulsion, Restasis 0.05 % eye drops in a dropperette, Disp: , Rfl:     multivitamin (THERAGRAN) TABS, Take 1 tablet by mouth daily, Disp: , Rfl:     Omega-3 Fatty Acids (FISH OIL) 1,000 mg, Take 1,000 mg by mouth daily, Disp: , Rfl:     Probiotic Product (PROBIOTIC DAILY PO), Take by mouth, Disp: , Rfl:     rivaroxaban (XARELTO) 10 mg tablet, Take 1 tablet (10 mg total) by mouth daily, Disp: 90 tablet, Rfl: 1    Turmeric (QC TUMERIC COMPLEX PO), Take by mouth, Disp: , Rfl:     VITAMIN D PO, Take by mouth, Disp: , Rfl:     albuterol (VENTOLIN HFA) 90 mcg/act inhaler, Inhale 2 puffs every 6 (six) hours as needed for wheezing, Disp: 1 Inhaler, Rfl: 11    Calcium Polycarbophil (FIBER-CAPS PO), Take by mouth, Disp: , Rfl:     colestipol (COLESTID) 1 g tablet, Take 1 g by mouth 2 (two) times a day (Patient not taking: Reported on 9/9/2021), Disp: , Rfl:     cyclobenzaprine (FLEXERIL) 5 mg tablet, 1-2 PO TID PRN, Disp: 12 tablet, Rfl: 0    methocarbamol (ROBAXIN) 500 mg tablet, Take 1 tablet (500 mg total) by mouth 2 (two) times a day (Patient not taking: Reported on 9/9/2021), Disp: 20 tablet, Rfl: 0    methocarbamol (ROBAXIN) 500 mg  tablet, Take 1 tablet (500 mg total) by mouth 2 (two) times a day as needed for muscle spasms, Disp: 15 tablet, Rfl: 0    methylPREDNISolone 4 MG tablet therapy pack, Use as directed on package, Disp: 21 tablet, Rfl: 0    oxyCODONE-acetaminophen (PERCOCET) 5-325 mg per tablet, Take 1 tablet by mouth every 4 (four) hours as needed for moderate pain for up to 10 dosesMax Daily Amount: 6 tablets, Disp: 8 tablet, Rfl: 0    rosuvastatin (CRESTOR) 10 MG tablet, rosuvastatin 10 mg tablet  TAKE 1 TABLET BY MOUTH EVERY DAY AT BEDTIME, Disp: , Rfl:     senna (Senna-Time) 8.6 MG tablet, Daily (Patient not taking: Reported on 9/9/2021), Disp: , Rfl:     sertraline (ZOLOFT) 25 mg tablet, sertraline 25 mg tablet  take 1 tablet by mouth once daily (Patient not taking: Reported on 9/9/2021), Disp: , Rfl:   Allergies   Allergen Reactions    Other Nasal Congestion     Dust mites       Labs:  Admission on 03/06/2024, Discharged on 03/06/2024   Component Date Value    Ventricular Rate 03/06/2024 62     Atrial Rate 03/06/2024 62     KS Interval 03/06/2024 186     QRSD Interval 03/06/2024 90     QT Interval 03/06/2024 394     QTC Interval 03/06/2024 399     P Axis 03/06/2024 25     QRS Axis 03/06/2024 36     T Wave [place] 03/06/2024 16     Ventricular Rate 03/06/2024 64     Atrial Rate 03/06/2024 64     KS Interval 03/06/2024 192     QRSD Interval 03/06/2024 90     QT Interval 03/06/2024 390     QTC Interval 03/06/2024 402     P [place] 03/06/2024 30     QRS [place] 03/06/2024 46     T Wave Axis 03/06/2024 20     WBC 03/06/2024 6.91     RBC 03/06/2024 4.93     Hemoglobin 03/06/2024 14.8     Hematocrit 03/06/2024 44.4     MCV 03/06/2024 90     MCH 03/06/2024 30.0     MCHC 03/06/2024 33.3     RDW 03/06/2024 12.2     MPV 03/06/2024 10.5     Platelets 03/06/2024 166     nRBC 03/06/2024 0     Segmented % 03/06/2024 67     Immature Grans % 03/06/2024 0     Lymphocytes % 03/06/2024 15     Monocytes % 03/06/2024 17 (H)     Eosinophils Relative  03/06/2024 1     Basophils Relative 03/06/2024 0     Absolute Neutrophils 03/06/2024 4.56     Absolute Immature Grans 03/06/2024 0.03     Absolute Lymphocytes 03/06/2024 1.05     Absolute Monocytes 03/06/2024 1.20     Eosinophils Absolute 03/06/2024 0.04     Basophils Absolute 03/06/2024 0.03     Protime 03/06/2024 12.7     INR 03/06/2024 0.90     PTT 03/06/2024 27     hs TnI 0hr 03/06/2024 3     SARS-CoV-2 03/06/2024 Positive (A)     INFLUENZA A PCR 03/06/2024 Negative     INFLUENZA B PCR 03/06/2024 Negative     RSV PCR 03/06/2024 Negative     Sodium 03/06/2024 135     Potassium 03/06/2024 4.1     Chloride 03/06/2024 100     CO2 03/06/2024 26     ANION GAP 03/06/2024 9     BUN 03/06/2024 23     Creatinine 03/06/2024 1.06     Glucose 03/06/2024 108     Calcium 03/06/2024 10.0     eGFR 03/06/2024 72     Total Bilirubin 03/06/2024 0.37     Bilirubin, Direct 03/06/2024 0.07     Alkaline Phosphatase 03/06/2024 80     AST 03/06/2024 17     ALT 03/06/2024 15     Total Protein 03/06/2024 7.3     Albumin 03/06/2024 4.2     BNP 03/06/2024 23     hs TnI 2hr 03/06/2024 2     Delta 2hr hsTnI 03/06/2024 -1     Ventricular Rate 03/06/2024 60     Atrial Rate 03/06/2024 60     OH Interval 03/06/2024 202     QRSD Interval 03/06/2024 92     QT Interval 03/06/2024 410     QTC Interval 03/06/2024 410     P [place] 03/06/2024 26     QRS [place] 03/06/2024 34     T Wave [place] 03/06/2024 11      Imaging: US RETROPERITONEUM (KIDNEY/BLADDER)    Result Date: 3/21/2024  Narrative: Indication: Renal cyst, nephrolithiasis. A retroperitoneal ultrasound was performed on 3/20/2024 to evaluate the kidneys and bladder. Comparison made with ultrasound of 3/13/2023. The right kidney measures 10.9 cm in length, and the left kidney measures 11.3. The renal cortex is unremarkable in echotexture bilaterally. No solid renal mass. Simple bilateral renal cysts again noted measuring up to 1.7 cm on the right and 1.3 cm on the left, no change. There is no  hydronephrosis bilaterally. Bilateral nonobstructing renal calculi again noted without gross change. The full urinary bladder volume measured 725 mL. No gross bladder mass or calculus. Bilateral ureteral jets were visualized. After voiding there was trace post void residual of 20 mL.    Impression: Impression: 1. Simple bilateral renal cysts and nonobstructing calculi grossly not changed from prior ultrasound. 2. Small post void bladder residual. Workstation:[id number]      Review of Systems:  Review of Systems   Constitutional:  Negative for activity change, diaphoresis, fatigue and fever.   HENT:  Positive for congestion and rhinorrhea. Negative for hearing loss, nosebleeds, sinus pressure and trouble swallowing.    Eyes:  Negative for visual disturbance.   Respiratory:  Positive for chest tightness. Negative for apnea, cough, choking, shortness of breath, wheezing and stridor.    Cardiovascular:  Positive for chest pain and leg swelling. Negative for palpitations.   Endocrine: Negative for cold intolerance and heat intolerance.   Genitourinary:  Negative for difficulty urinating, frequency and hematuria.   Musculoskeletal:  Negative for arthralgias, gait problem and myalgias.   Skin:  Negative for pallor and rash.   Allergic/Immunologic: Negative for immunocompromised state.   Neurological:  Negative for dizziness, syncope, speech difficulty and weakness.   Hematological:  Bruises/bleeds easily.   Psychiatric/Behavioral:  Negative for sleep disturbance. The patient is not nervous/anxious.        Physical Exam:  Physical Exam  Vitals reviewed.   Constitutional:       General: He is not in acute distress.     Appearance: Normal appearance. He is normal weight. He is not ill-appearing, toxic-appearing or diaphoretic.   HENT:      Head: Normocephalic.   Eyes:      General: No scleral icterus.     Conjunctiva/sclera: Conjunctivae normal.   Neck:      Vascular: No carotid bruit.   Cardiovascular:      Rate and Rhythm:  Normal rate and regular rhythm.      Pulses: Normal pulses.      Heart sounds: Normal heart sounds. No murmur heard.     No friction rub. No gallop.   Pulmonary:      Effort: Pulmonary effort is normal. No respiratory distress.      Breath sounds: Normal breath sounds. No stridor. No wheezing, rhonchi or rales.   Chest:      Chest wall: No tenderness.   Abdominal:      General: Abdomen is flat. Bowel sounds are normal. There is no distension.      Palpations: Abdomen is soft.      Tenderness: There is no abdominal tenderness.   Musculoskeletal:      Right lower leg: No edema.      Left lower leg: No edema.   Skin:     General: Skin is warm and dry.      Capillary Refill: Capillary refill takes less than 2 seconds.      Coloration: Skin is not jaundiced or pale.      Findings: No bruising or erythema.   Neurological:      Mental Status: He is alert and oriented to person, place, and time.   Psychiatric:         Mood and Affect: Mood normal.         Behavior: Behavior normal.         Thought Content: Thought content normal.         Judgment: Judgment normal.         Discussion/Summary: Borderline abnormal EKG.  As described above.  Associated chest pain syndrome in the setting of possible pleuritic secondary to COVID-19 infection improving.  Given his risk factors we will proceed with noninvasive evaluation.  As well as repeat lipid profile.  Probably restart his statin therapy I asked him to follow low-cholesterol diet, explained the rational for lipid-lowering therapy.  He was previously put on full anticoagulation on a permanent basis because of this factor V Leiden positive, I did recommend for him to restart the, he has had no significant bleeding complications from that.  Further recommendations pending the results of the testing.    This note was completed in part utilizing [employer] direct voice recognition software.   Grammatical errors, random word insertion, spelling mistakes, and incomplete sentences  may be an occasional consequence of the system secondary to software limitations, ambient noise and hardware issues. At the time of dictation, efforts were made to edit, clarify and /or correct errors.  Please read the chart carefully and recognize, using context, where substitutions have occurred.  If you have any questions or concerns about the context, text or information contained within the body of this dictation, please contact myself, the provider, for further clarification.

## 2024-04-29 LAB
CHOLEST SERPL-MCNC: 224 MG/DL
CHOLEST/HDLC SERPL: 3.7 (CALC)
HDLC SERPL-MCNC: 60 MG/DL
LDLC SERPL CALC-MCNC: 144 MG/DL (CALC)
NONHDLC SERPL-MCNC: 164 MG/DL (CALC)
TRIGL SERPL-MCNC: 91 MG/DL

## 2024-04-30 ENCOUNTER — OFFICE VISIT (OUTPATIENT)
Dept: HEMATOLOGY ONCOLOGY | Facility: CLINIC | Age: 69
End: 2024-04-30
Payer: COMMERCIAL

## 2024-04-30 VITALS
OXYGEN SATURATION: 98 % | DIASTOLIC BLOOD PRESSURE: 82 MMHG | BODY MASS INDEX: 26.07 KG/M2 | SYSTOLIC BLOOD PRESSURE: 140 MMHG | HEART RATE: 72 BPM | WEIGHT: 176 LBS | TEMPERATURE: 97.3 F | HEIGHT: 69 IN | RESPIRATION RATE: 12 BRPM

## 2024-04-30 DIAGNOSIS — Z86.718 HISTORY OF DVT (DEEP VEIN THROMBOSIS): ICD-10-CM

## 2024-04-30 DIAGNOSIS — Z86.711 HISTORY OF PULMONARY EMBOLISM: ICD-10-CM

## 2024-04-30 DIAGNOSIS — D68.51 FACTOR V LEIDEN CARRIER (HCC): Primary | ICD-10-CM

## 2024-04-30 PROCEDURE — 99203 OFFICE O/P NEW LOW 30 MIN: CPT | Performed by: PHYSICIAN ASSISTANT

## 2024-04-30 RX ORDER — TADALAFIL 5 MG/1
TABLET ORAL AS NEEDED
COMMUNITY

## 2024-04-30 RX ORDER — AMLODIPINE BESYLATE 5 MG/1
1 TABLET ORAL DAILY
COMMUNITY
Start: 2024-02-29

## 2024-04-30 NOTE — PROGRESS NOTES
[address] [facility] [place]  [address] [facility]  [place] [address]  Hematology Ambulatory Consult  Augusto Momin, 1955, [id number]  4/30/2024      Assessment and Plan   1. Factor V Leiden carrier (HCC)  2. History of DVT (deep vein thrombosis)  3. History of pulmonary embolism  Patient is a 68-year-old who was seen by 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    He has history of nonocclusive pulmonary emboli in the right lower lobe and lingular segmental branches along with acute occlusive DVT in the peroneal veins in the left lower extremity, diagnosed in November 2019.    He was initially placed on Xarelto 20 mg daily. Dose was decreased to Xarelto 10mg daily after about 1 years time.    Due to unprovoked nature and having heterozygous factor 5 Leiden patient was recommended to remain on long-term anticoagulation, preventative dose.     He says he stopped taking the Xarelto several months ago after having one episode of epistaxis. We discussed today that Xarelto is only of benefit to him if it is taken regularly as prescribed. If he has minor bleeding there is typically no need to stop Xarelto.    I explained today that I would recommend that he restart Xarelto at prophylactic dose to help decrease his likelihood of VTE recurrence. Patient is agreeable. He will make his PCP aware if he has recurrence of epistaxis/ other bleeding issues on Xarelto.    The patient will be made PRN. He can continue to follow with his PCP for Xarelto refills, etc.  Patient voiced agreement and understanding to the above.   Patient knows to call the Hematology/Oncology office with any questions and concerns regarding the above.    Barrier(s) to care: None.   The patient is  able to self care.    Subjective     Chief Complaint   Patient presents with    Consult     Factor V leiden, history of VTE        Referring provider    No referring provider defined for this encounter.    History of present illness:   Patient is a 68-year-old male with history of left lower extremity DVT nonocclusive and pulmonary emboli within the right lower lobe and lingular segment branches, diagnosed in November 2019.     He has been seen by Florinda Juan PA-C, in our office in the past, last visit in 2021.    His history is significant in that he presented to the ED in November 2019 with pain in the lower extremities bilaterally and shortness of breath. He was diagnosed with both DVT and PE, as above. There were no obvious provoking factors including previous travel, surgery, immobility. He is not obese. He is a nonsmoker.      Patient was started on Xarelto 20 mg daily.     He had hypercoagulable work-up. This showed heterozygous factor 5 Leiden, protein C activity normal, protein S activity elevated (no clinical significance), beta 2 glycoproteins negative, antithrombin III activity elevated (no clinical significance), anticardiolipin antibodies negative, lupus anticoagulant positive.     Patient repeated lupus anticoagulant testing on 06/01/2020 after coming off of Xarelto and this was negative.     Augusto is up-to-date with his routine health maintenance. He had repeat colonoscopy on 08/24/2020.  This showed a 5 mm polyp in the sigmoid colon which was removed.  Repeat was recommended in 5 years. He then had a hemorrhoid removal surgery on 09/29/2020.    He says he has a small amount of hemorrhoidal bleeding at times. When this happens he usually holds his Xarelto for a few weeks.    He says he suffered a nose bleed a few months ago. He says he had a sinus infection at that time. He has not taken the Xarelto since then.    He denies nausea, vomiting, bowel changes, chest pain, SOB,    Review of Systems   Constitutional:  Negative for activity change, appetite change, fatigue and fever.   HENT:  Negative for nosebleeds.     Respiratory:  Negative for cough, choking and shortness of breath.    Cardiovascular:  Negative for chest pain, palpitations and leg swelling.   Gastrointestinal:  Negative for abdominal distention, abdominal pain, anal bleeding, blood in stool, constipation, diarrhea, nausea and vomiting.   Endocrine: Negative for cold intolerance.   Genitourinary:  Negative for hematuria.   Musculoskeletal:  Negative for myalgias.   Skin:  Negative for color change, pallor and rash.   Allergic/Immunologic: Negative for immunocompromised state.   Neurological:  Negative for headaches.   Hematological:  Negative for adenopathy. Does not bruise/bleed easily.   All other systems reviewed and are negative.      Past Medical History:   Diagnosis Date    Asthma     Colon polyp     CPAP (continuous positive airway pressure) dependence     DVT (deep venous thrombosis) (HCC)     LLE    Hyperlipidemia     Hypertension     Kidney stone     Pulmonary emboli (HCC)     Sleep apnea      Past Surgical History:   Procedure Laterality Date    COLONOSCOPY      HEMORRHOID SURGERY N/A 2020    Procedure: HEMORRHOIDECTOMY EXCISION examination under anesthesia;  Surgeon: Clint Rea MD;  [facility]: [facility];  Service: General    ROTATOR CUFF REPAIR      SINUS SURGERY       Family History   Problem Relation Age of Onset    No Known Problems Mother     Colon cancer Father 73     Social History     Socioeconomic History    Marital status: /Civil Union     Spouse name: Not on file    Number of children: Not on file    Years of education: Not on file    Highest education level: Not on file   Occupational History    Not on file   Tobacco Use    Smoking status: Former     Current packs/day: 0.00     Average packs/day: 1 pack/day for 15.0 years (15.0 ttl pk-yrs)     Types: Cigarettes, Cigars     Start date:      Quit date: 2000     Years since quittin.3    Smokeless tobacco: Never   Vaping Use    Vaping status: Never Used   Substance  and Sexual Activity    Alcohol use: Yes     Comment: rare    Drug use: Never    Sexual activity: Not on file   Other Topics Concern    Not on file   Social History Narrative    Not on file     Social Determinants of Health     Financial Resource Strain: Not on file   Food Insecurity: Not on file   Transportation Needs: Not on file   Physical Activity: Not on file   Stress: Not on file   Social Connections: Not on file   Intimate Partner Violence: Not on file   Housing Stability: Not on file         Current Outpatient Medications:     albuterol (VENTOLIN HFA) 90 mcg/act inhaler, Inhale 2 puffs every 6 (six) hours as needed for wheezing, Disp: 1 Inhaler, Rfl: 11    Ascorbic Acid (VITAMIN C PO), Take by mouth, Disp: , Rfl:     CALCIUM ASCORBATE PO, Take by mouth, Disp: , Rfl:     Calcium Polycarbophil (FIBER-CAPS PO), Take by mouth, Disp: , Rfl:     colestipol (COLESTID) 1 g tablet, Take 1 g by mouth 2 (two) times a day (Patient not taking: Reported on 9/9/2021), Disp: , Rfl:     cyclobenzaprine (FLEXERIL) 5 mg tablet, 1-2 PO TID PRN, Disp: 12 tablet, Rfl: 0    cycloSPORINE (RESTASIS) 0.05 % ophthalmic emulsion, Restasis 0.05 % eye drops in a dropperette, Disp: , Rfl:     methocarbamol (ROBAXIN) 500 mg tablet, Take 1 tablet (500 mg total) by mouth 2 (two) times a day (Patient not taking: Reported on 9/9/2021), Disp: 20 tablet, Rfl: 0    methocarbamol (ROBAXIN) 500 mg tablet, Take 1 tablet (500 mg total) by mouth 2 (two) times a day as needed for muscle spasms, Disp: 15 tablet, Rfl: 0    methylPREDNISolone 4 MG tablet therapy pack, Use as directed on package, Disp: 21 tablet, Rfl: 0    multivitamin (THERAGRAN) TABS, Take 1 tablet by mouth daily, Disp: , Rfl:     Omega-3 Fatty Acids (FISH OIL) 1,000 mg, Take 1,000 mg by mouth daily, Disp: , Rfl:     oxyCODONE-acetaminophen (PERCOCET) 5-325 mg per tablet, Take 1 tablet by mouth every 4 (four) hours as needed for moderate pain for up to 10 dosesMax Daily Amount: 6 tablets,  Disp: 8 tablet, Rfl: 0    Probiotic Product (PROBIOTIC DAILY PO), Take by mouth, Disp: , Rfl:     rivaroxaban (XARELTO) 10 mg tablet, Take 1 tablet (10 mg total) by mouth daily, Disp: 90 tablet, Rfl: 1    rosuvastatin (CRESTOR) 10 MG tablet, rosuvastatin 10 mg tablet  TAKE 1 TABLET BY MOUTH EVERY DAY AT BEDTIME, Disp: , Rfl:     senna (Senna-Time) 8.6 MG tablet, Daily (Patient not taking: Reported on 9/9/2021), Disp: , Rfl:     sertraline (ZOLOFT) 25 mg tablet, sertraline 25 mg tablet  take 1 tablet by mouth once daily (Patient not taking: Reported on 9/9/2021), Disp: , Rfl:     Turmeric (QC TUMERIC COMPLEX PO), Take by mouth, Disp: , Rfl:     VITAMIN D PO, Take by mouth, Disp: , Rfl:   Allergies   Allergen Reactions    Other Nasal Congestion     Dust mites       Objective     Vitals:    04/30/24 0904   BP: 140/82   Pulse: 72   Resp: 12   Temp: (!) 97.3 °F (36.3 °C)   SpO2: 98%     Physical Exam  Constitutional:       General: He is not in acute distress.     Appearance: He is well-developed and normal weight.   HENT:      Head: Normocephalic and atraumatic.      Right Ear: External ear normal.      Left Ear: External ear normal.      Nose: Nose normal.   Eyes:      General: No scleral icterus.     Conjunctiva/sclera: Conjunctivae normal.   Cardiovascular:      Rate and Rhythm: Normal rate and regular rhythm.   Pulmonary:      Effort: Pulmonary effort is normal. No respiratory distress.      Breath sounds: Normal breath sounds.   Abdominal:      General: Abdomen is flat. There is no distension.      Palpations: Abdomen is soft.      Tenderness: There is no abdominal tenderness.   Skin:     Findings: No rash (on exposed skin.).   Neurological:      Mental Status: He is alert and oriented to person, place, and time.   Psychiatric:         Mood and Affect: Mood normal.         Thought Content: Thought content normal.         Judgment: Judgment normal.   Extremities: No lower extremity edema bilaterally.    Result  Review  Labs:   Latest Reference Range & Units 03/06/24 00:53   WBC 4.31 - 10.16 Thousand/uL 6.91   RBC 3.88 - 5.62 Million/uL 4.93   Hemoglobin 12.0 - 17.0 g/dL 14.8   Hematocrit 36.5 - 49.3 % 44.4   MCV 82 - 98 fL 90   MCH 26.8 - 34.3 pg 30.0   MCHC 31.4 - 37.4 g/dL 33.3   RDW 11.6 - 15.1 % 12.2   Platelet Count 149 - 390 Thousands/uL 166   MPV 8.9 - 12.7 fL 10.5   nRBC /100 WBCs 0   Segmented % 43 - 75 % 67   Lymphocytes % 14 - 44 % 15   Monocytes % 4 - 12 % 17 (H)   Eosinophils % 0 - 6 % 1   Basophils % 0 - 1 % 0   Immature Grans % 0 - 2 % 0   Absolute Immature Grans 0.00 - 0.20 Thousand/uL 0.03   Absolute Neutrophils 1.85 - 7.62 Thousands/µL 4.56   Absolute Lymphocytes 0.60 - 4.47 Thousands/µL 1.05   Absolute Monocytes 0.17 - 1.22 Thousand/µL 1.20   Absolute Eosinophils 0.00 - 0.61 Thousand/µL 0.04   Absolute Basophils 0.00 - 0.10 Thousands/µL 0.03   (H): Data is abnormally high    Imaging:   3/6/2024 CTA - CHEST WITH IV CONTRAST - PULMONARY ANGIOGRAM   IMPRESSION:     No evidence of acute pulmonary embolus, thoracic aortic aneurysm or dissection. No acute cardiopulmonary process.    11/28/2019 VAS lower limb venous duplex study, complete bilateral   CONCLUSION:  Impression:  RIGHT LOWER LIMB:  No evidence of acute or chronic deep vein thrombosis.  No evidence of superficial thrombophlebitis noted.  Doppler evaluation shows a normal response to augmentation maneuvers.  Popliteal, posterior tibial and anterior tibial arterial Doppler waveforms are  triphasic.     LEFT LOWER LIMB:  There is acute occlusive deep vein thrombosis in the peroneal veins.  No evidence of superficial thrombophlebitis noted.  Doppler evaluation shows a normal response to augmentation maneuvers.  Popliteal, posterior tibial and anterior tibial arterial Doppler waveforms are  triphasic.     Technical findings were given to Dr Joelle Garcia at 16:15 on 11/18/19.    11/18/2019 CTA - CHEST WITH IV CONTRAST - PULMONARY ANGIOGRAM    IMPRESSION:     Nonocclusive pulmonary emboli identified within right lower lobe and lingular segmental branches.  No findings of right heart strain.       Please note:  This report has been generated by a voice recognition software system. Therefore there may be syntax, spelling, and/or grammatical errors. Please call if you have any questions.

## 2024-05-02 ENCOUNTER — HOSPITAL ENCOUNTER (OUTPATIENT)
Dept: NON INVASIVE DIAGNOSTICS | Facility: CLINIC | Age: 69
Discharge: HOME | End: 2024-05-02
Payer: COMMERCIAL

## 2024-05-02 VITALS
DIASTOLIC BLOOD PRESSURE: 82 MMHG | SYSTOLIC BLOOD PRESSURE: 140 MMHG | HEART RATE: 60 BPM | BODY MASS INDEX: 26.07 KG/M2 | HEIGHT: 69 IN | WEIGHT: 176 LBS

## 2024-05-02 VITALS — HEIGHT: 69 IN | WEIGHT: 174 LBS | BODY MASS INDEX: 25.77 KG/M2

## 2024-05-02 DIAGNOSIS — R06.09 DYSPNEA ON EXERTION: ICD-10-CM

## 2024-05-02 DIAGNOSIS — R07.89 ATYPICAL CHEST PAIN: ICD-10-CM

## 2024-05-02 DIAGNOSIS — I10 PRIMARY HYPERTENSION: ICD-10-CM

## 2024-05-02 LAB
AORTIC ROOT: 3.5 CM
APICAL FOUR CHAMBER EJECTION FRACTION: 58 %
ASCENDING AORTA: 3.1 CM
BSA FOR ECHO PROCEDURE: 1.96 M2
DOP CALC LVOT AREA: 2.54 CM2
DOP CALC LVOT DIAMETER: 1.8 CM
E WAVE DECELERATION TIME: 225 MS
E/A RATIO: 1.19
FRACTIONAL SHORTENING: 48 (ref 28–44)
INTERVENTRICULAR SEPTUM IN DIASTOLE (PARASTERNAL SHORT AXIS VIEW): 1.4 CM
INTERVENTRICULAR SEPTUM: 1.4 CM (ref 0.6–1.1)
LAAS-AP2: 11.1 CM2
LAAS-AP4: 11.5 CM2
LEFT ATRIUM SIZE: 3.6 CM
LEFT ATRIUM VOLUME (MOD BIPLANE): 24 ML
LEFT ATRIUM VOLUME INDEX (MOD BIPLANE): 12.2 ML/M2
LEFT INTERNAL DIMENSION IN SYSTOLE: 2.2 CM (ref 2.1–4)
LEFT VENTRICULAR INTERNAL DIMENSION IN DIASTOLE: 4.2 CM (ref 3.5–6)
LEFT VENTRICULAR POSTERIOR WALL IN END DIASTOLE: 0.9 CM
LEFT VENTRICULAR STROKE VOLUME: 60 ML
LVSV (TEICH): 60 ML
MAX HR PERCENT: 79 %
MAX HR: 122 BPM
MV E'TISSUE VEL-LAT: 14 CM/S
MV E'TISSUE VEL-SEP: 7 CM/S
MV PEAK A VEL: 0.73 M/S
MV PEAK E VEL: 87 CM/S
MV STENOSIS PRESSURE HALF TIME: 65 MS
MV VALVE AREA P 1/2 METHOD: 3.4
RATE PRESSURE PRODUCT: NORMAL
RIGHT ATRIUM AREA SYSTOLE A4C: 9.8 CM2
RIGHT VENTRICLE ID DIMENSION: 3.2 CM
SINOTUBULAR JUNCTION: 2.9 CM
SL CV LEFT ATRIUM LENGTH A2C: 4.1 CM
SL CV LV EF: 65
SL CV PED ECHO LEFT VENTRICLE DIASTOLIC VOLUME (MOD BIPLANE) 2D: 77 ML
SL CV PED ECHO LEFT VENTRICLE SYSTOLIC VOLUME (MOD BIPLANE) 2D: 17 ML
SL CV SINUS OF VALSALVA 2D: 3.7 CM
SL CV STRESS RECOVERY BP: NORMAL MMHG
SL CV STRESS RECOVERY HR: 67 BPM
SL CV STRESS RECOVERY O2 SAT: 99 %
SL CV STRESS STAGE REACHED: 3
STJ: 2.9 CM
STRESS ANGINA INDEX: 0
STRESS BASELINE BP: NORMAL MMHG
STRESS BASELINE HR: 54 BPM
STRESS O2 SAT REST: 99 %
STRESS PEAK HR: 122 BPM
STRESS POST ESTIMATED WORKLOAD: 10.1 METS
STRESS POST EXERCISE DUR MIN: 9 MIN
STRESS POST EXERCISE DUR SEC: 1 SEC
STRESS POST O2 SAT PEAK: 98 %
STRESS POST PEAK BP: 160 MMHG
TR MAX PG: 18 MMHG
TR PEAK VELOCITY: 2.1 M/S
TRICUSPID ANNULAR PLANE SYSTOLIC EXCURSION: 2.2 CM
TRICUSPID VALVE PEAK REGURGITATION VELOCITY: 2.14 M/S

## 2024-05-02 PROCEDURE — 93306 TTE W/DOPPLER COMPLETE: CPT

## 2024-05-02 PROCEDURE — 93016 CV STRESS TEST SUPVJ ONLY: CPT | Performed by: INTERNAL MEDICINE

## 2024-05-02 PROCEDURE — 93018 CV STRESS TEST I&R ONLY: CPT | Performed by: INTERNAL MEDICINE

## 2024-05-02 PROCEDURE — 93306 TTE W/DOPPLER COMPLETE: CPT | Performed by: INTERNAL MEDICINE

## 2024-05-02 PROCEDURE — 93017 CV STRESS TEST TRACING ONLY: CPT

## 2024-05-03 LAB
CHEST PAIN STATEMENT: NORMAL
MAX DIASTOLIC BP: 82 MMHG
MAX PREDICTED HEART RATE: 154 BPM
PROTOCOL NAME: NORMAL
REASON FOR TERMINATION: NORMAL
STRESS POST EXERCISE DUR MIN: 9 MIN
STRESS POST EXERCISE DUR SEC: 1 SEC
STRESS POST PEAK HR: 122 BPM
STRESS POST PEAK SYSTOLIC BP: 164 MMHG
TARGET HR FORMULA: NORMAL
TEST INDICATION: NORMAL

## 2024-05-06 ENCOUNTER — TELEPHONE (OUTPATIENT)
Dept: CARDIOLOGY CLINIC | Facility: CLINIC | Age: 69
End: 2024-05-06

## 2024-05-06 NOTE — TELEPHONE ENCOUNTER
----- Message from Dianna Ronquillo sent at 4/30/2024  3:38 PM EDT -----    ----- Message -----  From: Sarkis Cannon MD  Sent: 4/30/2024   1:38 PM EDT  To: [facility]    Please call the patient regarding his abnormal result.  Consider statin therapy, awaiting results of other test

## 2024-05-06 NOTE — TELEPHONE ENCOUNTER
Spoke with pt and he would like to wait for other test results before considering statin medications.

## 2024-09-05 ENCOUNTER — OFFICE VISIT (OUTPATIENT)
Dept: URGENT CARE | Facility: MEDICAL CENTER | Age: 69
End: 2024-09-05
Payer: COMMERCIAL

## 2024-09-05 VITALS
HEIGHT: 68 IN | SYSTOLIC BLOOD PRESSURE: 167 MMHG | HEART RATE: 62 BPM | RESPIRATION RATE: 18 BRPM | TEMPERATURE: 97.6 F | OXYGEN SATURATION: 98 % | DIASTOLIC BLOOD PRESSURE: 78 MMHG | WEIGHT: 178.8 LBS | BODY MASS INDEX: 27.1 KG/M2

## 2024-09-05 DIAGNOSIS — I10 PRIMARY HYPERTENSION: Primary | ICD-10-CM

## 2024-09-05 DIAGNOSIS — H66.002 NON-RECURRENT ACUTE SUPPURATIVE OTITIS MEDIA OF LEFT EAR WITHOUT SPONTANEOUS RUPTURE OF TYMPANIC MEMBRANE: ICD-10-CM

## 2024-09-05 PROBLEM — J01.00 ACUTE NON-RECURRENT MAXILLARY SINUSITIS: Status: ACTIVE | Noted: 2024-09-05

## 2024-09-05 PROCEDURE — 93005 ELECTROCARDIOGRAM TRACING: CPT | Performed by: FAMILY MEDICINE

## 2024-09-05 PROCEDURE — S9083 URGENT CARE CENTER GLOBAL: HCPCS | Performed by: FAMILY MEDICINE

## 2024-09-05 PROCEDURE — G0382 LEV 3 HOSP TYPE B ED VISIT: HCPCS | Performed by: FAMILY MEDICINE

## 2024-09-05 NOTE — LETTER
September 5, 2024     Patient: Augusto Momin   YOB: 1955   Date of Visit: 9/5/2024       To Whom It May Concern:    Seen at this office on 9/5.    If you have any questions or concerns, please don't hesitate to call.         Sincerely,        [facility]    CC: No Recipients

## 2024-09-05 NOTE — PROGRESS NOTES
[facility]        NAME: Augusto Momin is a 68 y.o. male  : 1955    MRN: [id number]  DATE: 2024  TIME: 7:29 PM    Assessment and Plan   Primary hypertension [I10]  1. Primary hypertension      BP increased to 167/78 today.  but no signs of end organ damage  restart amlodipine  follow up with PCP      2. Non-recurrent acute suppurative otitis media of left ear without spontaneous rupture of tympanic membrane  amoxicillin-clavulanate (AUGMENTIN) 875-125 mg per tablet    given antibiotics and i think this is causing the vertigo.  restart meds as above        EKG done and reviewed by me - similar to EKG from march of this year. Scanned into chart.    Patient Instructions       Follow up with PCP in 3-5 days.  Proceed to  ER if symptoms worsen.    If tests have been performed at [facility], our office will contact you with results if changes need to be made to the care plan discussed with you at the visit.  [facility]    Chief Complaint     Chief Complaint   Patient presents with   • Dizziness     Last night started with elevated /93 highest reading. Not currently taking any HTN meds, but did in past. Dizziness noted with nausea.         History of Present Illness       67 yo male who presents with a history of about one month of sinus pressure, ear discomfort, PND and dizziness (room spinning) on and off.  He has a history of htn and stopped his bp meds around the same time.     Dizziness  This is a new problem. The current episode started in the past 7 days. The problem occurs constantly. The problem has been unchanged. Associated symptoms include congestion, coughing, fatigue and vertigo. Pertinent negatives include no abdominal pain, anorexia, arthralgias, change in bowel habit, chest pain, chills, diaphoresis, fever, headaches, joint swelling, myalgias, nausea, neck pain, numbness, rash, sore throat, swollen glands, urinary symptoms,  visual change, vomiting or weakness. Nothing aggravates the symptoms. He has tried nothing for the symptoms.       Review of Systems   Review of Systems   Constitutional:  Positive for fatigue. Negative for chills, diaphoresis and fever.   HENT:  Positive for congestion. Negative for sore throat.    Respiratory:  Positive for cough. Negative for chest tightness and shortness of breath.    Cardiovascular:  Negative for chest pain.   Gastrointestinal:  Negative for abdominal pain, anorexia, change in bowel habit, nausea and vomiting.   Musculoskeletal:  Negative for arthralgias, joint swelling, myalgias and neck pain.   Skin:  Negative for rash.   Neurological:  Positive for dizziness and vertigo. Negative for weakness, numbness and headaches.         Current Medications       Current Outpatient Medications:   •  amoxicillin-clavulanate (AUGMENTIN) 875-125 mg per tablet, Take 1 tablet by mouth every 12 (twelve) hours for 10 days, Disp: 20 tablet, Rfl: 0  •  Ascorbic Acid (VITAMIN C PO), Take by mouth, Disp: , Rfl:   •  CALCIUM ASCORBATE PO, Take by mouth, Disp: , Rfl:   •  Calcium Polycarbophil (FIBER-CAPS PO), Take by mouth, Disp: , Rfl:   •  multivitamin (THERAGRAN) TABS, Take 1 tablet by mouth daily, Disp: , Rfl:   •  Omega-3 Fatty Acids (FISH OIL) 1,000 mg, Take 1,000 mg by mouth daily, Disp: , Rfl:   •  Probiotic Product (PROBIOTIC DAILY PO), Take by mouth, Disp: , Rfl:   •  rivaroxaban (XARELTO) 10 mg tablet, Take 1 tablet (10 mg total) by mouth daily, Disp: 90 tablet, Rfl: 0  •  tadalafil (CIALIS) 5 MG tablet, as needed, Disp: , Rfl:   •  Turmeric (QC TUMERIC COMPLEX PO), Take by mouth, Disp: , Rfl:   •  VITAMIN D PO, Take by mouth, Disp: , Rfl:   •  albuterol (VENTOLIN HFA) 90 mcg/act inhaler, Inhale 2 puffs every 6 (six) hours as needed for wheezing (Patient not taking: Reported on 4/30/2024), Disp: 1 Inhaler, Rfl: 11  •  amLODIPine (NORVASC) 5 mg tablet, Take 1 tablet by mouth daily (Patient not taking:  Reported on 9/5/2024), Disp: , Rfl:   •  colestipol (COLESTID) 1 g tablet, Take 1 g by mouth 2 (two) times a day, Disp: , Rfl:   •  cyclobenzaprine (FLEXERIL) 5 mg tablet, 1-2 PO TID PRN (Patient not taking: Reported on 4/30/2024), Disp: 12 tablet, Rfl: 0  •  cycloSPORINE (RESTASIS) 0.05 % ophthalmic emulsion, Restasis 0.05 % eye drops in a dropperette (Patient not taking: Reported on 9/5/2024), Disp: , Rfl:   •  methocarbamol (ROBAXIN) 500 mg tablet, Take 1 tablet (500 mg total) by mouth 2 (two) times a day (Patient not taking: Reported on 9/9/2021), Disp: 20 tablet, Rfl: 0  •  methocarbamol (ROBAXIN) 500 mg tablet, Take 1 tablet (500 mg total) by mouth 2 (two) times a day as needed for muscle spasms, Disp: 15 tablet, Rfl: 0  •  methylPREDNISolone 4 MG tablet therapy pack, Use as directed on package (Patient not taking: Reported on 4/30/2024), Disp: 21 tablet, Rfl: 0  •  oxyCODONE-acetaminophen (PERCOCET) 5-325 mg per tablet, Take 1 tablet by mouth every 4 (four) hours as needed for moderate pain for up to 10 dosesMax Daily Amount: 6 tablets (Patient not taking: Reported on 4/30/2024), Disp: 8 tablet, Rfl: 0  •  rosuvastatin (CRESTOR) 10 MG tablet, rosuvastatin 10 mg tablet  TAKE 1 TABLET BY MOUTH EVERY DAY AT BEDTIME (Patient not taking: Reported on 4/30/2024), Disp: , Rfl:   •  senna (Senna-Time) 8.6 MG tablet, Daily (Patient not taking: Reported on 9/9/2021), Disp: , Rfl:   •  sertraline (ZOLOFT) 25 mg tablet, sertraline 25 mg tablet  take 1 tablet by mouth once daily, Disp: , Rfl:     Current Allergies     Allergies as of 09/05/2024 - Reviewed 09/05/2024   Allergen Reaction Noted   • Other Nasal Congestion 12/19/2022            The following portions of the patient's history were reviewed and updated as appropriate: allergies, current medications, past family history, past medical history, past social history, past surgical history and problem list.     Past Medical History:   Diagnosis Date   • Asthma    •  "Colon polyp    • CPAP (continuous positive airway pressure) dependence    • DVT (deep venous thrombosis) (HCC)     LLE   • Hyperlipidemia    • Hypertension    • Kidney stone    • Pulmonary emboli (HCC)    • Sleep apnea        Past Surgical History:   Procedure Laterality Date   • COLONOSCOPY     • HEMORRHOID SURGERY N/A 9/29/2020    Procedure: HEMORRHOIDECTOMY EXCISION examination under anesthesia;  Surgeon: Clint Rea MD;  Location[facility];  Service: General   • ROTATOR CUFF REPAIR     • SINUS SURGERY         Family History   Problem Relation Age of Onset   • No Known Problems Mother    • Colon cancer Father 73         Medications have been verified.        Objective   /78   Pulse 62   Temp 97.6 °F (36.4 °C)   Resp 18   Ht 5' 8\" (1.727 m)   Wt 81.1 kg (178 lb 12.8 oz)   SpO2 98%   BMI 27.19 kg/m²   No LMP for male patient.       Physical Exam     Physical Exam  Vitals reviewed.   Constitutional:       General: He is not in acute distress.     Appearance: Normal appearance. He is not ill-appearing, toxic-appearing or diaphoretic.   HENT:      Head: Normocephalic and atraumatic.      Right Ear: Tympanic membrane normal. There is no impacted cerumen.      Left Ear: Ear canal and external ear normal. No decreased hearing noted. No laceration, drainage, swelling or tenderness.  No middle ear effusion. There is no impacted cerumen. No foreign body. No mastoid tenderness. No PE tube. No hemotympanum. Tympanic membrane is injected, erythematous and bulging.      Ears:      Comments: Mildly bulging     Nose: Congestion and rhinorrhea present.      Comments: Sinus tenderness     Mouth/Throat:      Mouth: Mucous membranes are moist.   Abdominal:      General: Abdomen is flat.      Palpations: Abdomen is soft.   Neurological:      Mental Status: He is alert.                   "

## 2024-09-06 LAB
ATRIAL RATE: 56 BPM
P AXIS: 10 DEGREES
PR INTERVAL: 206 MS
QRS AXIS: 5 DEGREES
QRSD INTERVAL: 98 MS
QT INTERVAL: 408 MS
QTC INTERVAL: 393 MS
T WAVE AXIS: 16 DEGREES
VENTRICULAR RATE: 56 BPM

## 2024-09-06 PROCEDURE — 93010 ELECTROCARDIOGRAM REPORT: CPT | Performed by: INTERNAL MEDICINE

## 2024-09-10 PROCEDURE — 93010 ELECTROCARDIOGRAM REPORT: CPT | Performed by: INTERNAL MEDICINE

## 2024-10-05 PROBLEM — J01.00 ACUTE NON-RECURRENT MAXILLARY SINUSITIS: Status: RESOLVED | Noted: 2024-09-05 | Resolved: 2024-10-05

## 2025-06-18 ENCOUNTER — APPOINTMENT (EMERGENCY)
Dept: RADIOLOGY | Facility: HOSPITAL | Age: 70
End: 2025-06-18
Payer: COMMERCIAL

## 2025-06-18 ENCOUNTER — APPOINTMENT (INPATIENT)
Dept: RADIOLOGY | Facility: HOSPITAL | Age: 70
End: 2025-06-18
Payer: COMMERCIAL

## 2025-06-18 ENCOUNTER — HOSPITAL ENCOUNTER (INPATIENT)
Facility: HOSPITAL | Age: 70
LOS: 1 days | Discharge: HOME | End: 2025-06-19
Attending: EMERGENCY MEDICINE | Admitting: INTERNAL MEDICINE
Payer: COMMERCIAL

## 2025-06-18 DIAGNOSIS — R20.0 LEFT FACIAL NUMBNESS: Primary | ICD-10-CM

## 2025-06-18 DIAGNOSIS — G45.9 TIA (TRANSIENT ISCHEMIC ATTACK): ICD-10-CM

## 2025-06-18 DIAGNOSIS — R29.90 STROKE-LIKE SYMPTOMS: ICD-10-CM

## 2025-06-18 PROBLEM — Z86.718 HISTORY OF VENOUS THROMBOEMBOLISM: Status: ACTIVE | Noted: 2025-06-18

## 2025-06-18 PROBLEM — J45.909 ASTHMA: Status: ACTIVE | Noted: 2025-06-18

## 2025-06-18 LAB
4HR DELTA HS TROPONIN: >1 NG/L
ALBUMIN SERPL BCG-MCNC: 4.1 G/DL (ref 3.5–5)
ALP SERPL-CCNC: 80 U/L (ref 34–104)
ALT SERPL W P-5'-P-CCNC: 19 U/L (ref 7–52)
ANION GAP SERPL CALCULATED.3IONS-SCNC: 10 MMOL/L (ref 4–13)
APTT PPP: 27 SECONDS (ref 23–34)
AST SERPL W P-5'-P-CCNC: 20 U/L (ref 13–39)
BILIRUB SERPL-MCNC: 0.36 MG/DL (ref 0.2–1)
BILIRUB UR QL STRIP: NEGATIVE
BUN SERPL-MCNC: 17 MG/DL (ref 5–25)
CALCIUM SERPL-MCNC: 10 MG/DL (ref 8.4–10.2)
CARDIAC TROPONIN I PNL SERPL HS: 3 NG/L (ref 8–18)
CARDIAC TROPONIN I PNL SERPL HS: 3 NG/L (ref ?–50)
CARDIAC TROPONIN I PNL SERPL HS: <2 NG/L (ref ?–50)
CHLORIDE SERPL-SCNC: 103 MMOL/L (ref 96–108)
CLARITY UR: CLEAR
CO2 SERPL-SCNC: 23 MMOL/L (ref 21–32)
COLOR UR: NORMAL
CREAT SERPL-MCNC: 0.98 MG/DL (ref 0.6–1.3)
ERYTHROCYTE [DISTWIDTH] IN BLOOD BY AUTOMATED COUNT: 12.3 % (ref 11.6–15.1)
GFR SERPL CREATININE-BSD FRML MDRD: 79 ML/MIN/1.73SQ M
GLUCOSE SERPL-MCNC: 82 MG/DL (ref 65–140)
GLUCOSE SERPL-MCNC: 92 MG/DL (ref 65–140)
GLUCOSE UR STRIP-MCNC: NEGATIVE MG/DL
HCT VFR BLD AUTO: 45.5 % (ref 36.5–49.3)
HGB BLD-MCNC: 15.4 G/DL (ref 12–17)
HGB UR QL STRIP.AUTO: NEGATIVE
INR PPP: 0.96 (ref 0.85–1.19)
KETONES UR STRIP-MCNC: NEGATIVE MG/DL
LEUKOCYTE ESTERASE UR QL STRIP: NEGATIVE
MAGNESIUM SERPL-MCNC: 2.1 MG/DL (ref 1.9–2.7)
MCH RBC QN AUTO: 30.9 PG (ref 26.8–34.3)
MCHC RBC AUTO-ENTMCNC: 33.8 G/DL (ref 31.4–37.4)
MCV RBC AUTO: 91 FL (ref 82–98)
NITRITE UR QL STRIP: NEGATIVE
PH UR STRIP.AUTO: 7 [PH]
PLATELET # BLD AUTO: 173 THOUSANDS/UL (ref 149–390)
PMV BLD AUTO: 10.4 FL (ref 8.9–12.7)
POTASSIUM SERPL-SCNC: 4.1 MMOL/L (ref 3.5–5.3)
PROT SERPL-MCNC: 6.7 G/DL (ref 6.4–8.4)
PROT UR STRIP-MCNC: NEGATIVE MG/DL
PROTHROMBIN TIME: 13.3 SECONDS (ref 12.3–15)
RBC # BLD AUTO: 4.99 MILLION/UL (ref 3.88–5.62)
SODIUM SERPL-SCNC: 136 MMOL/L (ref 135–147)
SP GR UR STRIP.AUTO: 1.01 (ref 1–1.03)
UROBILINOGEN UR STRIP-ACNC: <2 MG/DL
WBC # BLD AUTO: 5.58 THOUSAND/UL (ref 4.31–10.16)

## 2025-06-18 PROCEDURE — 85027 COMPLETE CBC AUTOMATED: CPT | Performed by: EMERGENCY MEDICINE

## 2025-06-18 PROCEDURE — 36415 COLL VENOUS BLD VENIPUNCTURE: CPT | Performed by: EMERGENCY MEDICINE

## 2025-06-18 PROCEDURE — 99285 EMERGENCY DEPT VISIT HI MDM: CPT

## 2025-06-18 PROCEDURE — 81003 URINALYSIS AUTO W/O SCOPE: CPT | Performed by: EMERGENCY MEDICINE

## 2025-06-18 PROCEDURE — 93005 ELECTROCARDIOGRAM TRACING: CPT

## 2025-06-18 PROCEDURE — 85730 THROMBOPLASTIN TIME PARTIAL: CPT | Performed by: EMERGENCY MEDICINE

## 2025-06-18 PROCEDURE — 83735 ASSAY OF MAGNESIUM: CPT | Performed by: EMERGENCY MEDICINE

## 2025-06-18 PROCEDURE — 71045 X-RAY EXAM CHEST 1 VIEW: CPT

## 2025-06-18 PROCEDURE — 82948 REAGENT STRIP/BLOOD GLUCOSE: CPT

## 2025-06-18 PROCEDURE — 99223 1ST HOSP IP/OBS HIGH 75: CPT | Performed by: INTERNAL MEDICINE

## 2025-06-18 PROCEDURE — 80053 COMPREHEN METABOLIC PANEL: CPT | Performed by: EMERGENCY MEDICINE

## 2025-06-18 PROCEDURE — 87081 CULTURE SCREEN ONLY: CPT | Performed by: INTERNAL MEDICINE

## 2025-06-18 PROCEDURE — 70551 MRI BRAIN STEM W/O DYE: CPT

## 2025-06-18 PROCEDURE — NC001 PR NO CHARGE: Performed by: PSYCHIATRY & NEUROLOGY

## 2025-06-18 PROCEDURE — 99205 OFFICE O/P NEW HI 60 MIN: CPT | Performed by: PSYCHIATRY & NEUROLOGY

## 2025-06-18 PROCEDURE — 70498 CT ANGIOGRAPHY NECK: CPT

## 2025-06-18 PROCEDURE — 70496 CT ANGIOGRAPHY HEAD: CPT

## 2025-06-18 PROCEDURE — 99285 EMERGENCY DEPT VISIT HI MDM: CPT | Performed by: EMERGENCY MEDICINE

## 2025-06-18 PROCEDURE — 84484 ASSAY OF TROPONIN QUANT: CPT | Performed by: EMERGENCY MEDICINE

## 2025-06-18 PROCEDURE — 85610 PROTHROMBIN TIME: CPT | Performed by: EMERGENCY MEDICINE

## 2025-06-18 RX ORDER — CHLORAL HYDRATE 500 MG
1000 CAPSULE ORAL DAILY
Status: DISCONTINUED | OUTPATIENT
Start: 2025-06-19 | End: 2025-06-19 | Stop reason: HOSPADM

## 2025-06-18 RX ORDER — ASCORBIC ACID 500 MG
500 TABLET ORAL DAILY
Status: DISCONTINUED | OUTPATIENT
Start: 2025-06-19 | End: 2025-06-19 | Stop reason: HOSPADM

## 2025-06-18 RX ORDER — HYDRALAZINE HYDROCHLORIDE 20 MG/ML
5 INJECTION INTRAMUSCULAR; INTRAVENOUS EVERY 6 HOURS PRN
Status: DISCONTINUED | OUTPATIENT
Start: 2025-06-18 | End: 2025-06-19 | Stop reason: HOSPADM

## 2025-06-18 RX ORDER — ACETAMINOPHEN 325 MG/1
650 TABLET ORAL EVERY 6 HOURS PRN
Status: DISCONTINUED | OUTPATIENT
Start: 2025-06-18 | End: 2025-06-19 | Stop reason: HOSPADM

## 2025-06-18 RX ORDER — ASPIRIN 81 MG/1
81 TABLET, CHEWABLE ORAL DAILY
Status: DISCONTINUED | OUTPATIENT
Start: 2025-06-19 | End: 2025-06-19 | Stop reason: HOSPADM

## 2025-06-18 RX ORDER — ALBUTEROL SULFATE 0.83 MG/ML
2.5 SOLUTION RESPIRATORY (INHALATION) EVERY 6 HOURS PRN
Status: DISCONTINUED | OUTPATIENT
Start: 2025-06-18 | End: 2025-06-19 | Stop reason: HOSPADM

## 2025-06-18 RX ORDER — ATORVASTATIN CALCIUM 40 MG/1
40 TABLET, FILM COATED ORAL
Status: DISCONTINUED | OUTPATIENT
Start: 2025-06-18 | End: 2025-06-19 | Stop reason: HOSPADM

## 2025-06-18 RX ORDER — ONDANSETRON 2 MG/ML
4 INJECTION INTRAMUSCULAR; INTRAVENOUS EVERY 4 HOURS PRN
Status: DISCONTINUED | OUTPATIENT
Start: 2025-06-18 | End: 2025-06-19 | Stop reason: HOSPADM

## 2025-06-18 RX ORDER — SACCHAROMYCES BOULARDII 250 MG
250 CAPSULE ORAL 2 TIMES DAILY
Status: DISCONTINUED | OUTPATIENT
Start: 2025-06-18 | End: 2025-06-19 | Stop reason: HOSPADM

## 2025-06-18 RX ADMIN — ATORVASTATIN CALCIUM 40 MG: 40 TABLET, FILM COATED ORAL at 18:54

## 2025-06-18 RX ADMIN — Medication 250 MG: at 18:54

## 2025-06-18 RX ADMIN — IOHEXOL 85 ML: 350 INJECTION, SOLUTION INTRAVENOUS at 10:20

## 2025-06-18 NOTE — H&P
H&P - Hospitalist   Name: Augusto Momin 69 y.o. male I MRN: [id number]  Unit/Bed#: ICU 08 I Date of Admission: 6/18/2025   Date of Service: 6/18/2025 I Hospital Day: 0     Assessment & Plan  Stroke-like symptoms  Presented with transient episodes of left-sided facial numbness with upper/lower extremity numbness/weakness, now resolved  CT of head report, personally reviewed, noting no acute intracranial abnormalities, and subsequent CT angiogram of head/neck report, personally reviewed, noting moderate left vertebral artery stenosis without large vessel occlusions or aneurysms  Continue intensity statin and initiate ASA daily - of note, chronically on Xarelto for history of venous thromboembolism  Check updated lipid panel and A1c  Await echocardiogram and MRI brain  Neurology consultation  Serial neurochecks  PT/OT and speech therapy evaluations  Essential hypertension  Allow permissive hypertension for now  PRN IV Hydralazine on board for excessive BP spikes  Low-sodium diet  Hyperlipidemia  Continue statin/fish oil  Asthma  Stable/asymptomatic  PRN Albuterol  History of venous thromboembolism  Continue Xarelto  Obstructive sleep apnea  CPAP QHS      VTE Pharmacologic Prophylaxis: VTE Score: 7 High Risk (Score >/= 5) - Pharmacological DVT Prophylaxis Ordered: Rivaroxaban (Xarelto). Sequential Compression Devices Ordered.    Code Status: Level 1 - Full Code    Discussion with:  Patient at bedside    Anticipated Length of Stay:  Patient will be admitted on an Inpatient basis with an anticipated length of stay of greater than 2 midnights.   Justification for Hospital Stay: Strokelike symptoms requiring MRI brain and neurologic evaluation.    Total Time for Visit (including Counseling & Coordination of Care):  75 minutes. More than 50% of total time spent on counseling and coordination of care, on one or more of the following: performing physical exam; counseling and coordination of care; obtaining or reviewing  history; documenting in the medical record; reviewing/ordering tests, medications or procedures; communicating with other healthcare professionals and discussing with patient's family/caregivers.      History of Present Illness    Chief Complaint: Left sided numbness and leg weakness    Augusto Momin is a 69 y.o. male who presents with complaints of left-sided facial numbness along with generalized weakness on the left side of his body which initiated shortly after waking up today.  He also notes that recently, with certain sudden positional movements from laying/sitting to standing, he also experiences transient dizziness.  Denies any prior history of strokes.  Medical history is notable for hypertension and hyperlipidemia, along with a history of DVT and pulmonary embolism, chronically on anticoagulation with Xarelto.  He is compliant with his anticoagulant, however, recalls that a few weeks ago, he had to hold dosing for several days due to a dental procedure.  His symptoms transiently improved during his course in the ED.  At the time of my encounter, he denies any current/ongoing numbness or significant weakness.  Overall, he remains in pleasant, and hopeful spirits.    Review of Systems:  A thorough 12 point review systems was conducted.  Pertinent positives and negatives are mentioned in the history of present illness.      Past Medical & Surgical History    Past Medical History[1]    Past Surgical History[2]      Medications:   Prior to Admission medications    Medication Sig Start Date End Date Taking? Authorizing Provider   rivaroxaban (XARELTO) 10 mg tablet Take 1 tablet (10 mg total) by mouth daily 4/30/24  Yes Nuvia Gatica PA-C   albuterol (VENTOLIN HFA) 90 mcg/act inhaler Inhale 2 puffs every 6 (six) hours as needed for wheezing  Patient not taking: Reported on 4/30/2024 1/29/20   Jaaynt Lake MD   amLODIPine (NORVASC) 5 mg tablet Take 1 tablet by mouth daily  Patient not taking:  Reported on 9/5/2024 2/29/24   Historical Provider, MD   Ascorbic Acid (VITAMIN C PO) Take by mouth  Patient not taking: Reported on 6/18/2025    Historical Provider, MD   CALCIUM ASCORBATE PO Take by mouth  Patient not taking: Reported on 6/18/2025    Historical Provider, MD   Calcium Polycarbophil (FIBER-CAPS PO) Take by mouth  Patient not taking: Reported on 6/18/2025    Historical Provider, MD   colestipol (COLESTID) 1 g tablet Take 1 g by mouth 2 (two) times a day    Historical Provider, MD   cyclobenzaprine (FLEXERIL) 5 mg tablet 1-2 PO TID PRN  Patient not taking: Reported on 4/30/2024 1/31/22   Sintia Lacey PA-C   cycloSPORINE (RESTASIS) 0.05 % ophthalmic emulsion Restasis 0.05 % eye drops in a dropperette  Patient not taking: Reported on 9/5/2024    Historical Provider, MD   methocarbamol (ROBAXIN) 500 mg tablet Take 1 tablet (500 mg total) by mouth 2 (two) times a day  Patient not taking: Reported on 9/9/2021 4/19/21   Jamie May DO   methocarbamol (ROBAXIN) 500 mg tablet Take 1 tablet (500 mg total) by mouth 2 (two) times a day as needed for muscle spasms 9/9/21   Jamie Friend DO   methylPREDNISolone 4 MG tablet therapy pack Use as directed on package  Patient not taking: Reported on 4/30/2024 9/9/21   Jamie Friend DO   multivitamin (THERAGRAN) TABS Take 1 tablet by mouth daily  Patient not taking: Reported on 6/18/2025    Historical Provider, MD   Omega-3 Fatty Acids (FISH OIL) 1,000 mg Take 1,000 mg by mouth daily  Patient not taking: Reported on 6/18/2025    Historical Provider, MD   oxyCODONE-acetaminophen (PERCOCET) 5-325 mg per tablet Take 1 tablet by mouth every 4 (four) hours as needed for moderate pain for up to 10 dosesMax Daily Amount: 6 tablets  Patient not taking: Reported on 4/30/2024 9/9/21   Jamie Friend DO   Probiotic Product (PROBIOTIC DAILY PO) Take by mouth  Patient not taking: Reported on 6/18/2025    Historical Provider, MD   rosuvastatin (CRESTOR) 10 MG tablet  "rosuvastatin 10 mg tablet   TAKE 1 TABLET BY MOUTH EVERY DAY AT BEDTIME  Patient not taking: Reported on 4/30/2024    Historical Provider, MD   senna (Senna-Time) 8.6 MG tablet Daily  Patient not taking: Reported on 9/9/2021 2/27/21   Historical Provider, MD   sertraline (ZOLOFT) 25 mg tablet sertraline 25 mg tablet   take 1 tablet by mouth once daily 3/18/21   Historical Provider, MD   tadalafil (CIALIS) 5 MG tablet as needed  Patient not taking: Reported on 6/18/2025    Historical Provider, MD   Turmeric (QC TUMERIC COMPLEX PO) Take by mouth  Patient not taking: Reported on 6/18/2025    Historical Provider, MD   VITAMIN D PO Take by mouth  Patient not taking: Reported on 6/18/2025    Historical Provider, MD       Allergies: Allergies[3]      Social & Family History    Social History     Substance and Sexual Activity   Alcohol Use Yes    Comment: rare     Tobacco Use History[4]  Social History     Substance and Sexual Activity   Drug Use Never       Family History[5]      Objective     Vitals:   Blood Pressure: 160/76 (06/18/25 1600)  Pulse: 65 (06/18/25 1600)  Temperature: 98.7 °F (37.1 °C) (06/18/25 1600)  Temp Source: Tympanic (06/18/25 1600)  Respirations: 18 (06/18/25 1600)  Height: 5' 8\" (172.7 cm) (06/18/25 1441)  Weight - Scale: 71 kg (156 lb 8.4 oz) (06/18/25 1441)  SpO2: 96 % (06/18/25 1600)      Physical Exam:    GENERAL No immediate distress at rest   HEAD   Normocephalic - atraumatic   EYES Nonicteric   MOUTH   Mucosa moist   NECK   Supple - full range of motion   CARDIAC Rate controlled   PULMONARY Fairly clear to auscultation   ABDOMEN Nontender/nondistended   MUSCULOSKELETAL   Motor strength/range of motion intact   NEUROLOGIC   Alert/oriented at baseline -no current focal deficits - improved left facial and extremity sensation   PSYCHIATRIC   Mood/affect stable         Labs & Recent Cultures:  Results from last 7 days   Lab Units 06/18/25  1016   WBC Thousand/uL 5.58   HEMOGLOBIN g/dL 15.4 "   HEMATOCRIT % 45.5   PLATELETS Thousands/uL 173     Results from last 7 days   Lab Units 06/18/25  1016   SODIUM mmol/L 136   POTASSIUM mmol/L 4.1   CHLORIDE mmol/L 103   CO2 mmol/L 23   BUN mg/dL 17   CREATININE mg/dL 0.98   ANION GAP mmol/L 10   CALCIUM mg/dL 10.0   ALBUMIN g/dL 4.1   TOTAL BILIRUBIN mg/dL 0.36   ALK PHOS U/L 80   ALT U/L 19   AST U/L 20   GLUCOSE RANDOM mg/dL 82     Results from last 7 days   Lab Units 06/18/25  1016   INR  0.96     Results from last 7 days   Lab Units 06/18/25  1010   POC GLUCOSE mg/dl 92                         Lines/Drains:  Invasive Devices       Peripheral Intravenous Line  Duration             Peripheral IV 06/18/25 Right Antecubital <1 day                      Imaging:     X-ray chest 1 view portable  Result Date: 6/18/2025  Narrative: XR CHEST PORTABLE INDICATION: cva. COMPARISON: CTA chest 3/6/2024 and priors, chest x-ray 1/20/2020 FINDINGS: Clear lungs. No pneumothorax or pleural effusion. Normal cardiomediastinal silhouette. Right shoulder osteoarthrosis. Normal upper abdomen.     Impression: No acute cardiopulmonary disease. Workstation performed: [id number]       CT stroke alert brain  Result Date: 6/18/2025  Narrative: CT BRAIN - STROKE ALERT PROTOCOL INDICATION:   Stroke Alert. COMPARISON: 3/23/2022 TECHNIQUE:  CT examination of the brain was performed.  In addition to axial images, coronal reformatted images were created and submitted for interpretation. Radiation dose length product (DLP) for this visit:  940.88 mGy-cm. .  This examination, like all CT scans performed in the [facility], was performed utilizing techniques to minimize radiation dose exposure, including the use of iterative reconstruction and automated exposure control. IMAGE QUALITY:  Diagnostic. FINDINGS: PARENCHYMA:  No intracranial mass, mass effect or midline shift. No CT signs of acute infarction.  No acute parenchymal hemorrhage. Normal intracranial vasculature. VENTRICLES  AND EXTRA-AXIAL SPACES:  Normal for the patient's age. VISUALIZED ORBITS: Normal visualized orbits. PARANASAL SINUSES: Mild mucosal thickening involving the bilateral ethmoid air cells, left maxillary sinus. Mastoid air cells are clear. CALVARIUM AND EXTRACRANIAL SOFT TISSUES:   Normal.     Impression: No acute intracranial abnormality. Findings were directly discussed with Lloyd Arias at 10:21 a.m. on 6/18/2025. Workstation performed: [id number]       CTA stroke alert (head/neck)  Result Date: 6/18/2025  Narrative: CTA NECK AND BRAIN WITH CONTRAST INDICATION: Stroke Alert COMPARISON:   Prior CT angiogram of the head and neck from 3/23/2022, prior head CT from 6/18/2025 TECHNIQUE:  Post contrast imaging was performed after administration of iodinated contrast through the neck and brain. Post contrast axial 0.625 mm images timed to opacify the arterial system.  3D rendering was performed on an independent workstation.   MIP reconstructions performed. Coronal and sagittal reconstructions were performed of the non contrast portion of the brain. Radiation dose length product (DLP) for this visit:  581.28 mGy-cm. .  This examination, like all CT scans performed in the [facility], was performed utilizing techniques to minimize radiation dose exposure, including the use of iterative reconstruction and automated exposure control. IV Contrast: 85 mL of iohexol (OMNIPAQUE) IMAGE QUALITY:   Diagnostic FINDINGS: CTA NECK ARCH AND GREAT VESSELS: Aberrant right subclavian artery, congenital variant. Visualized aortic arch is otherwise grossly normal. VERTEBRAL ARTERIES: Proximal right cervical vertebral artery is partially secured due to streak artifact from adjacent dense intravenous contrast. Dominant left cervical vertebral artery. Remaining visualized cervical vertebral arteries appear grossly normal without stenosis, occlusion or dissection. RIGHT CAROTID: Minimal noncalcified atherosclerotic plaque right  carotid bulb/bifurcation. No stenosis.    No dissection. LEFT CAROTID: Minimal noncalcified atherosclerotic plaque left carotid bulb/bifurcation. No stenosis.    No dissection. NASCET criteria was used to determine the degree of internal carotid artery diameter stenosis. CTA BRAIN: INTERNAL CAROTID ARTERIES: Mild calcified atherosclerotic plaque bilateral cavernous internal carotid arteries, without stenosis. No occlusion. ANTERIOR CEREBRAL ARTERY CIRCULATION:  No stenosis or occlusion. MIDDLE CEREBRAL ARTERY CIRCULATION:  No stenosis or occlusion. DISTAL VERTEBRAL ARTERIES: Hypoplastic right vertebral artery, congenital variant. Stable at least moderate focal stenosis of the intradural left vertebral artery due to mixed atherosclerotic plaque. No occlusion noted. BASILAR ARTERY: Diminutive due to fetal origins of the bilateral posterior cerebral arteries, congenital variant. No stenosis or occlusion. POSTERIOR CEREBRAL ARTERIES: No stenosis or occlusion. Fetal origins of the bilateral PCAs, congenital variant, with hypoplastic P1 segments and bilateral patent posterior communicating arteries noted. VENOUS STRUCTURES:  Normal. NON VASCULAR ANATOMY BRAIN: Post contrast imaging in the arterial phase is unremarkable.  No delayed imaging of the brain was obtained. BONY STRUCTURES: Multilevel cervical spondylosis. No fractures are identified. Periodontal disease, with multiple periapical lucencies/abscesses involving residual mandibular and maxillary teeth. Interval removal of right mandibular first molar tooth, with likely bone graft material noted. SOFT TISSUES OF THE NECK:  Normal. THORACIC INLET:  Unremarkable.     Impression: Stable at least moderate focal stenosis of the intradural left vertebral artery due to mixed atherosclerotic plaque. No large vessel occlusion, or intracranial aneurysm identified on CT angiogram of the head. No hemodynamically significant stenosis or dissection identified on CT angiogram of  the neck. Findings were directly discussed with Lloyd Arias at 10:26 a.m. on 2025.. Workstation performed: [id number]                     DENNIS BADILLO MD   Hospitalist - [facility] Internal Medicine        ** Please Note:  Documentation is constructed using a voice recognition dictation system.  An occasional wrong word/phrase or “sound-a-like” substitution may have been picked up by dictation device due to the inherent limitations of voice recognition software.  Read the chart carefully and recognize, using reasonable context, where substitutions may have occurred.**        [1]   Past Medical History:  Diagnosis Date    Asthma     Colon polyp     CPAP (continuous positive airway pressure) dependence     DVT (deep venous thrombosis) (HCC)     LLE    Hyperlipidemia     Hypertension     Kidney stone     Pulmonary emboli (HCC)     Sleep apnea    [2]   Past Surgical History:  Procedure Laterality Date    COLONOSCOPY      HEMORRHOID SURGERY N/A 2020    Procedure: HEMORRHOIDECTOMY EXCISION examination under anesthesia;  Surgeon: Clint Rea MD;  Location: [facility];  Service: General    ROTATOR CUFF REPAIR      SINUS SURGERY     [3]   Allergies  Allergen Reactions    Other Nasal Congestion     Dust mites   [4]   Social History  Tobacco Use   Smoking Status Former    Current packs/day: 0.00    Average packs/day: 1 pack/day for 15.0 years (15.0 ttl pk-yrs)    Types: Cigarettes, Cigars    Start date:     Quit date: 2000    Years since quittin.4   Smokeless Tobacco Never   [5]   Family History  Problem Relation Name Age of Onset    No Known Problems Mother      Colon cancer Father  73

## 2025-06-18 NOTE — LETTER
[facility]  UNIT  [address] [place] [id number]  Dept: [phone]    June 19, 2025     Patient: Augusto Momin   YOB: 1955   Date of Visit: 6/18/2025       To Whom it May Concern:    Augusto Momin is under my professional care. He was seen in the hospital from 6/18/2025 to 06/19/25. He may return to work on 6/23/25 without limitations.    If you have any questions or concerns, please don't hesitate to call.         Sincerely,          Royce Tena MD

## 2025-06-18 NOTE — ASSESSMENT & PLAN NOTE
Allow permissive hypertension for now  PRN IV Hydralazine on board for excessive BP spikes  Low-sodium diet

## 2025-06-18 NOTE — ASSESSMENT & PLAN NOTE
Presented with transient episodes of left-sided facial numbness with upper/lower extremity numbness/weakness, now resolved  CT of head report, personally reviewed, noting no acute intracranial abnormalities, and subsequent CT angiogram of head/neck report, personally reviewed, noting moderate left vertebral artery stenosis without large vessel occlusions or aneurysms  Continue intensity statin and initiate ASA daily - of note, chronically on Xarelto for history of venous thromboembolism  Check updated lipid panel and A1c  Await echocardiogram and MRI brain  Neurology consultation  Serial neurochecks  PT/OT and speech therapy evaluations

## 2025-06-18 NOTE — QUICK NOTE
Stroke alert called at 10:13  Neurology response at immediate.   Patient not seen, recommendation based on information provided by ED provider over the phone    68 yo with HTN, HLD, DVT/PE on xarelto who presented with left sided numbness, mainly in the face. He was normal at 1 am. Woke up with his symptoms. Bilateral leg weakness. No motor weakness or other sensory changes. He has blurred vision of the left eye.     LKW: 1 am   NIHSS 1  per ED exam  SBP  175/81 , Glu 92    CTH without acute bleed or large territorial infarct. CTA without LVO, focal left vert stenosis, mild bilateral ICA cavernous stenosis.  Imaging reviewed on PACS and discussed with radiologist.     IV TNK was not given due to OOW and Xarelto use   Continue home Xarelto   Permissive HTN for now, BP no greater than 180/100 mmHg  Admit to stroke pathway

## 2025-06-18 NOTE — ED PROVIDER NOTES
Time reflects when diagnosis was documented in both MDM as applicable and the Disposition within this note       Time User Action Codes Description Comment    2025 10:12 AM Richard Chowdarya Add [R20.0] Left facial numbness     2025 11:32 AM Pita Chowdary Add [G45.9] TIA (transient ischemic attack)           ED Disposition       ED Disposition   Admit    Condition   Stable    Date/Time    11:32 AM    Comment   Case was discussed with Dr. Zepeda and the patient's admission status was agreed to be Admission Status: inpatient status to the service of Dr. Zepeda             Assessment & Plan       Medical Decision Making  69-year-old male past medical history as documented, significant for hypertension, dyslipidemia, DVT/PE on Xarelto presenting with left facial, left upper extremity sensory deficits and bilateral lower extremity weakness.  On exam patient A&0X3 mildly hypertensive, bradycardic, regular.  Exam significant for decreased sensation to left face NIHSS=1  DDx: CVA, TIA, metabolic/electrolyte abnormality, anxiety  Stroke code called, though patient is out of the window for tPA/TNK  - FS  - CTH, CTA h/n  - EKG  - Labs  - Ua, UCx  - CXR  - Neuro consult  - re-eval, dispo       Amount and/or Complexity of Data Reviewed  Labs: ordered.  Radiology: ordered and independent interpretation performed. Decision-making details documented in ED Course.  ECG/medicine tests: ordered and independent interpretation performed.    Risk  Prescription drug management.  Decision regarding hospitalization.        ED Course as of 25 1134      1016   Case d/w TeleDr. Juana Joseph  - current s&s reviewed, agrees to consult   1028   Per Radiologist - Dr. Villalobos  Neuro aware    Stroke alert noncontrast head ct is negative for hemorrhage or obvious acute infarct    CtA has a focal moderate stenosis intradural left vertebral artery but no LvO high grade stenosis or aneurysm.  CTA neck- the  proximal right cervical vert is partially obscured due to surrounding dense intravenous contrast but no stenosis occlusion or dissection noted in the remaining vessels   1029 Per Dr. Juana Kline  Admit under stroke pathway. SBP goal <180. continue xarelto    1037 X-ray chest 1 view portable  NAD   1044 EKG @ 1035: SB, 58 bpm, diffuse ST elevations, likely early repol, unchanged from 12/2024.  No CP or other ACS equivalents.   1102 Pt now asymptomatic.  ED w/u NAD.  Ready for Adm.    1130 Case d/w Dr. Zepeda, agrees with adm. Inpt Neuro at bedside.       Medications   iohexol (OMNIPAQUE) 350 MG/ML injection (MULTI-DOSE) 85 mL (85 mL Intravenous Given 6/18/25 1020)       ED Risk Strat Scores                    No data recorded        SBIRT 22yo+      Flowsheet Row Most Recent Value   Initial Alcohol Screen: US AUDIT-C     1. How often do you have a drink containing alcohol? 1 Filed at: 06/18/2025 1017   2. How many drinks containing alcohol do you have on a typical day you are drinking?  0 Filed at: 06/18/2025 1017   3a. Male UNDER 65: How often do you have five or more drinks on one occasion? 0 Filed at: 06/18/2025 1017   3b. FEMALE Any Age, or MALE 65+: How often do you have 4 or more drinks on one occassion? 0 Filed at: 06/18/2025 1017   Audit-C Score 1 Filed at: 06/18/2025 1017   BRIGIDA: How many times in the past year have you...    Used an illegal drug or used a prescription medication for non-medical reasons? Never Filed at: 06/18/2025 1017                            History of Present Illness       Chief Complaint   Patient presents with    CVA/TIA-like Symptoms     Patient reports awaking this morning w/ left sided facial numbness, left sides arm numbness/weakness & left eye blurred vision. Last known well: 5am this morning. Patient reports numbness radiating from back of head into neck.        Past Medical History[1]   Past Surgical History[2]   Family History[3]   Social History[4]   E-Cigarette/Vaping     E-Cigarette Use Never User       E-Cigarette/Vaping Substances    Nicotine No     THC No     CBD No     Flavoring No     Other No     Unknown No       I have reviewed and agree with the history as documented.     78-year-old male past medical history as documented, significant for hypertension, dyslipidemia, DVT/PE on Xarelto presenting with complaints of left facial numbness and left-sided blurry vision.  When he awoke this morning around 5:00 he first noted his symptoms.  He went to sleep at 1 AM and he was asymptomatic at the time.  At the start of his symptoms he also felt lightheaded and felt like both of his legs were negative from under him.  He denies any focal weakness, no speech disturbance.  No headache neck pain or stiffness.  He currently still has the left facial numbness and blurry vision.  ROS otherwise negative as documented.  No other acute complaints at this time.      History provided by:  Patient   used: No        Review of Systems   Constitutional:  Negative for chills, fatigue and fever.   HENT:  Negative for congestion and sore throat.    Eyes:  Negative for pain and visual disturbance.   Respiratory:  Negative for cough, chest tightness and shortness of breath.    Cardiovascular:  Negative for chest pain and palpitations.   Gastrointestinal:  Positive for nausea. Negative for abdominal distention, abdominal pain, constipation, diarrhea and vomiting.   Genitourinary:  Negative for difficulty urinating, dysuria, flank pain and hematuria.   Musculoskeletal:  Negative for arthralgias, back pain, neck pain and neck stiffness.   Skin:  Negative for color change and rash.   Neurological:  Positive for weakness and numbness. Negative for dizziness, seizures, syncope, facial asymmetry, speech difficulty, light-headedness and headaches.        Left facial numbness, left upper extremity numbness, bilateral lower extremity weakness   Psychiatric/Behavioral:  The patient is  nervous/anxious.    All other systems reviewed and are negative.          Objective       ED Triage Vitals   Temperature Pulse Blood Pressure Respirations SpO2 Patient Position - Orthostatic VS   06/18/25 1015 06/18/25 1011 06/18/25 1010 06/18/25 1011 06/18/25 1011 06/18/25 1011   98.2 °F (36.8 °C) 85 (!) 175/81 20 98 % Sitting      Temp Source Heart Rate Source BP Location FiO2 (%) Pain Score    06/18/25 1115 06/18/25 1011 06/18/25 1011 -- --    Oral Monitor Left arm        Vitals      Date and Time Temp Pulse SpO2 Resp BP Pain Score FACES Pain Rating User   06/18/25 1115 -- 55 98 % 14 148/79 -- --    06/18/25 1055 -- 52 97 % 12 -- -- --    06/18/25 1050 -- 52 95 % 12 148/79 -- --    06/18/25 1045 -- 65 Simultaneous filing. User may not have seen previous data. 97 % Simultaneous filing. User may not have seen previous data. 20 Simultaneous filing. User may not have seen previous data. 148/79 -- --    06/18/25 1040 -- 56 95 % 14 149/81 -- --    06/18/25 1035 -- 57 96 % 15 -- -- --    06/18/25 1030 -- 60 98 % 20 155/83 -- --    06/18/25 1020 -- -- -- -- 154/74 -- --    06/18/25 1015 98.2 °F (36.8 °C) -- 99 % 20 154/74 -- -- DQ   06/18/25 1011 -- 85 98 % 20 175/81 -- --    06/18/25 1010 -- -- -- -- 175/81 -- -- AC            Physical Exam  Vitals and nursing note reviewed.   Constitutional:       Appearance: Normal appearance. He is well-developed.   HENT:      Head: Normocephalic and atraumatic.      Nose: Nose normal.      Mouth/Throat:      Mouth: Mucous membranes are moist.     Eyes:      Extraocular Movements: Extraocular movements intact.      Conjunctiva/sclera: Conjunctivae normal.      Pupils: Pupils are equal, round, and reactive to light.       Cardiovascular:      Rate and Rhythm: Regular rhythm. Bradycardia present.   Pulmonary:      Effort: Pulmonary effort is normal.      Breath sounds: Normal breath sounds.   Abdominal:      General: There is no distension.      Palpations: Abdomen is  soft.      Tenderness: There is no abdominal tenderness.     Musculoskeletal:         General: No swelling or tenderness. Normal range of motion.      Cervical back: Normal range of motion and neck supple.      Right lower leg: No edema.      Left lower leg: No edema.     Skin:     General: Skin is warm and dry.      Capillary Refill: Capillary refill takes less than 2 seconds.      Findings: No rash.     Neurological:      General: No focal deficit present.      Mental Status: He is alert and oriented to person, place, and time.      Cranial Nerves: No cranial nerve deficit.      Sensory: Sensory deficit present.      Motor: No weakness.      Coordination: Coordination normal.      Gait: Gait normal.      Comments: Left facial numbness   Psychiatric:      Comments: Moderately anxious, depressed mood, flat affect         Results Reviewed       Procedure Component Value Units Date/Time    HS Troponin 0hr (reflex protocol) [[id number]]  (Normal) Collected: 06/18/25 1038    Lab Status: Final result Specimen: Blood from Line, Venous Updated: 06/18/25 1108     hs TnI 0hr <2 ng/L     HS Troponin I 2hr [[id number]]     Lab Status: No result Specimen: Blood     High Sensitivity Troponin I Random [[id number]]  (Abnormal) Collected: 06/18/25 1016    Lab Status: Final result Specimen: Blood from Arm, Right Updated: 06/18/25 1049     HS TnI random 3 ng/L     Magnesium [[id number]]  (Normal) Collected: 06/18/25 1016    Lab Status: Final result Specimen: Blood from Arm, Right Updated: 06/18/25 1042     Magnesium 2.1 mg/dL     Comprehensive metabolic panel [[id number]] Collected: 06/18/25 1016    Lab Status: Final result Specimen: Blood from Arm, Right Updated: 06/18/25 1042     Sodium 136 mmol/L      Potassium 4.1 mmol/L      Chloride 103 mmol/L      CO2 23 mmol/L      ANION GAP 10 mmol/L      BUN 17 mg/dL      Creatinine 0.98 mg/dL      Glucose 82 mg/dL      Calcium 10.0 mg/dL      AST 20 U/L      ALT 19 U/L      Alkaline Phosphatase  80 U/L      Total Protein 6.7 g/dL      Albumin 4.1 g/dL      Total Bilirubin 0.36 mg/dL      eGFR 79 ml/min/1.73sq m     Narrative:      [employer] guidelines for Chronic Kidney Disease (CKD):     Stage 1 with normal or high GFR (GFR > 90 mL/min/1.73 square meters)    Stage 2 Mild CKD (GFR = 60-89 mL/min/1.73 square meters)    Stage 3A Moderate CKD (GFR = 45-59 mL/min/1.73 square meters)    Stage 3B Moderate CKD (GFR = 30-44 mL/min/1.73 square meters)    Stage 4 Severe CKD (GFR = 15-29 mL/min/1.73 square meters)    Stage 5 End Stage CKD (GFR <15 mL/min/1.73 square meters)  Note: GFR calculation is accurate only with a steady state creatinine    Protime-INR [[id number]]  (Normal) Collected: 06/18/25 1016    Lab Status: Final result Specimen: Blood from Arm, Right Updated: 06/18/25 1038     Protime 13.3 seconds      INR 0.96    Narrative:      INR Therapeutic Range    Indication                                             INR Range      Atrial Fibrillation                                               2.0-3.0  Hypercoagulable State                                    2.0.2.3  Left Ventricular Asist Device                            2.0-3.0  Mechanical Heart Valve                                  -    Aortic(with afib, MI, embolism, HF, LA enlargement,    and/or coagulopathy)                                     2.0-3.0 (2.5-3.5)     Mitral                                                             2.5-3.5  Prosthetic/Bioprosthetic Heart Valve               2.0-3.0  Venous thromboembolism (VTE: VT, PE        2.0-3.0    APTT [[id number]]  (Normal) Collected: 06/18/25 1016    Lab Status: Final result Specimen: Blood from Arm, Right Updated: 06/18/25 1038     PTT 27 seconds     CBC and Platelet [[id number]]  (Normal) Collected: 06/18/25 1016    Lab Status: Final result Specimen: Blood from Arm, Right Updated: 06/18/25 1023     WBC 5.58 Thousand/uL      RBC 4.99 Million/uL      Hemoglobin 15.4 g/dL       Hematocrit 45.5 %      MCV 91 fL      MCH 30.9 pg      MCHC 33.8 g/dL      RDW 12.3 %      Platelets 173 Thousands/uL      MPV 10.4 fL     UA w Reflex to Microscopic w Reflex to Culture [[id number]]     Lab Status: No result Specimen: Urine     Fingerstick Glucose (POCT) [[id number]]  (Normal) Collected: 06/18/25 1010    Lab Status: Final result Specimen: Blood Updated: 06/18/25 1010     POC Glucose 92 mg/dl             X-ray chest 1 view portable   Final Interpretation by Sachin Cheek MD (06/18 1035)      No acute cardiopulmonary disease.            Workstation performed: [id number]         CTA stroke alert (head/neck)   Final Interpretation by Mauricio Villalobos MD (06/18 1034)   Stable at least moderate focal stenosis of the intradural left vertebral artery due to mixed atherosclerotic plaque. No large vessel occlusion, or intracranial aneurysm identified on CT angiogram of the head.      No hemodynamically significant stenosis or dissection identified on CT angiogram of the neck.         Findings were directly discussed with Lloyd Arias at 10:26 a.m. on 6/18/2025..      Workstation performed: [id number]         CT stroke alert brain   Final Interpretation by Mauricio Villalobos MD (06/18 1035)      No acute intracranial abnormality.      Findings were directly discussed with Lloyd Arias at 10:21 a.m. on 6/18/2025.      Workstation performed: [id number]             Procedures    ED Medication and Procedure Management   Prior to Admission Medications   Prescriptions Last Dose Informant Patient Reported? Taking?   Ascorbic Acid (VITAMIN C PO)  Self Yes No   Sig: Take by mouth   CALCIUM ASCORBATE PO  Self Yes No   Sig: Take by mouth   Calcium Polycarbophil (FIBER-CAPS PO)  Self Yes No   Sig: Take by mouth   Omega-3 Fatty Acids (FISH OIL) 1,000 mg  Self Yes No   Sig: Take 1,000 mg by mouth daily   Probiotic Product (PROBIOTIC DAILY PO)  Self Yes No   Sig: Take by mouth   Turmeric (QC TUMERIC COMPLEX PO)  Self Yes No   Sig:  Take by mouth   VITAMIN D PO  Self Yes No   Sig: Take by mouth   albuterol (VENTOLIN HFA) 90 mcg/act inhaler  Self No No   Sig: Inhale 2 puffs every 6 (six) hours as needed for wheezing   Patient not taking: Reported on 2024   amLODIPine (NORVASC) 5 mg tablet  Self Yes No   Sig: Take 1 tablet by mouth daily   Patient not taking: Reported on 2024   colestipol (COLESTID) 1 g tablet  Self Yes No   Sig: Take 1 g by mouth 2 (two) times a day   cycloSPORINE (RESTASIS) 0.05 % ophthalmic emulsion  Self Yes No   Sig: Restasis 0.05 % eye drops in a dropperette   Patient not taking: Reported on 2024   cyclobenzaprine (FLEXERIL) 5 mg tablet  Self No No   Si-2 PO TID PRN   Patient not taking: Reported on 2024   methocarbamol (ROBAXIN) 500 mg tablet  Self No No   Sig: Take 1 tablet (500 mg total) by mouth 2 (two) times a day   Patient not taking: Reported on 2021   methocarbamol (ROBAXIN) 500 mg tablet  Self No No   Sig: Take 1 tablet (500 mg total) by mouth 2 (two) times a day as needed for muscle spasms   methylPREDNISolone 4 MG tablet therapy pack  Self No No   Sig: Use as directed on package   Patient not taking: Reported on 2024   multivitamin (THERAGRAN) TABS  Self Yes No   Sig: Take 1 tablet by mouth daily   oxyCODONE-acetaminophen (PERCOCET) 5-325 mg per tablet  Self No No   Sig: Take 1 tablet by mouth every 4 (four) hours as needed for moderate pain for up to 10 dosesMax Daily Amount: 6 tablets   Patient not taking: Reported on 2024   rivaroxaban (XARELTO) 10 mg tablet   No No   Sig: Take 1 tablet (10 mg total) by mouth daily   rosuvastatin (CRESTOR) 10 MG tablet  Self Yes No   Sig: rosuvastatin 10 mg tablet   TAKE 1 TABLET BY MOUTH EVERY DAY AT BEDTIME   Patient not taking: Reported on 2024   senna (Senna-Time) 8.6 MG tablet  Self Yes No   Sig: Daily   Patient not taking: Reported on 2021   sertraline (ZOLOFT) 25 mg tablet  Self Yes No   Sig: sertraline 25 mg tablet   take 1  tablet by mouth once daily   tadalafil (CIALIS) 5 MG tablet  Self Yes No   Sig: as needed      Facility-Administered Medications: None     Patient's Medications   Discharge Prescriptions    No medications on file     No discharge procedures on file.  ED SEPSIS DOCUMENTATION   Time reflects when diagnosis was documented in both MDM as applicable and the Disposition within this note       Time User Action Codes Description Comment    2025 10:12 AM Pita Chowdary [R20.0] Left facial numbness     2025 11:32 AM Pita Chowdary [G45.9] TIA (transient ischemic attack)                      Pita Chowdary MD  25 1040       Pita Chowdary MD  25 1121       [1]   Past Medical History:  Diagnosis Date    Asthma     Colon polyp     CPAP (continuous positive airway pressure) dependence     DVT (deep venous thrombosis) (HCC)     LLE    Hyperlipidemia     Hypertension     Kidney stone     Pulmonary emboli (HCC)     Sleep apnea    [2]   Past Surgical History:  Procedure Laterality Date    COLONOSCOPY      HEMORRHOID SURGERY N/A 2020    Procedure: HEMORRHOIDECTOMY EXCISION examination under anesthesia;  Surgeon: Clint Rea MD;  Location: [facility];  Service: General    ROTATOR CUFF REPAIR      SINUS SURGERY     [3]   Family History  Problem Relation Name Age of Onset    No Known Problems Mother      Colon cancer Father  73   [4]   Social History  Tobacco Use    Smoking status: Former     Current packs/day: 0.00     Average packs/day: 1 pack/day for 15.0 years (15.0 ttl pk-yrs)     Types: Cigarettes, Cigars     Start date:      Quit date: 2000     Years since quittin.4    Smokeless tobacco: Never   Vaping Use    Vaping status: Never Used   Substance Use Topics    Alcohol use: Yes     Comment: rare    Drug use: Never        Pita Chowdary MD  25 1134

## 2025-06-18 NOTE — CONSULTS
[facility]   Neurology Initial Consult    Augusto Momin is a 69 y.o. male  ED CT1/ED CT1          Information obtained from:   Chief Complaint   Patient presents with    CVA/TIA-like Symptoms     Patient reports awaking this morning w/ left sided facial numbness, left sides arm numbness/weakness & left eye blurred vision. Last known well: 5am this morning. Patient reports numbness radiating from back of head into neck.        Assessment & Plan  Stroke-like symptoms  Patient is a 69-year-old male with onset of left facial numbness, weakness in the legs, nausea and lightheadedness this a.m. upon waking.  Patient was normal around 1 AM when he went to bed, noted around 5 AM when waking he had symptoms of numbness to the left V3 segment of his face, V1 and V2 distributions were without any sensory change.  He noted having some weakness and wobbliness to the legs like they were going to give way.  He had onset of lightheadedness which she has had in the past.  He noted some nausea as well.  Patient stated that he had similar event a couple weeks ago after having dental procedure.  He was off of his Xarelto for approximately 4 days to have tooth removal, he also had caps placed on his 2 additional teeth with noted infection.  He resumed his Xarelto approximately 2 weeks ago but noted similar symptoms during that time he was off of the medication.  Patient reported with regards to his dental issues, he had 3 infected teeth, 1 had to be removed.  He did complete antibiotic therapy for this and did not feel as though he was having any further issues with infection although did report having the chills approximately 1 week ago with no reported fevers.  Patient has chronic pain in his neck with sharp pains also has had lower extremity swelling and lightheadedness with position changes.  Patient per account has poor nutrition, he drinks coffee, tea, sugary fruit drinks with limited oral hydration of  water.  Patient wears compression socks due to his swelling and positional lightheadedness.  On neurological exam he had no focal deficits, no visual deficits or speech or swallowing deficits.  Patient remains on Xarelto, will continue his home dosing, continue with statin while under stroke pathway.  MRI of the brain recommended, permissive hypertension no more than 180/110, PT/OT/ST and TTE with shunt study pending.    - Monitor on telemetry  -Neurological assessments per stroke pathway  -Continues on Xarelto 10 mg daily  -Atorvastatin 40 mg daily  -MRI of the brain  -TTE with shunt study  -Lipid profile and A1c, patient may benefit from vitamin levels as well given poor nutrition.  -Patient may also benefit from orthostatic vital signs due to reported positional lightheaded this a.m. with this event.  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  Patient also reports preemptive symptoms of lightheadedness and weakness/wobbliness in the bilateral lower extremities.  Patient was normal going to bed at approximately 1 AM, noted waking at 5 AM with onset of symptoms.  Patient is on Xarelto and out of the window of treatment time for TNK he was not a candidate.  Patient was a stroke alert, neurology on-call followed up immediately.  Taken for CAT scan.  CT of the head without acute bleed or large territory infarct  CTA of the head and neck without LVO, focal left vertebral stenosis noted in moderate degree with mild bilateral ICA cavernous stenosis.  These images were reviewed by the on-call neurologist and discussed with radiology as well.  Patient was continued on his home dosing of Xarelto, allowed some permissive hypertension with a blood pressure no greater than 180/100.  Met with patient at bedside today,  stated that he awoke with numbness along the V3 distribution on the left side of his face.  He noted having positive lightheadedness and nausea.  Felt as though his legs were wobbly and weak however that symptom would come and go.  He denied any falls with regards to the symptoms.  Patient states he is on his Xarelto, had history of being off approximately 2 weeks ago when he had dental surgery.  Patient reported with his dental surgery he was off of his Xarelto for approximately 4 days.  He believes he restarted it about 2 weeks ago.  He stated during that time being off of his medication he had similar symptoms with left-sided facial sensation changes and feeling weak and with wobbly legs.  He also noted having onset of chest pain without shortness of breath during that time.  Patient stated he had been on antibiotic therapy for infection to the tooth that was removed, he also had 2 associated teeth that were not removed however now have caps on them.  Patient denies any fever but noted having chills approximately 1 week ago.  He had isolated event that were not continued.  Patient symptoms had returned to baseline, question the etiology being anxiety versus stroke related symptoms versus TIA.  Patient is being admitted under the stroke pathway for further evaluation.      Past Medical History[1]    Past Surgical History[2]    Allergies[3]    Current Medications[4]    Social History     Socioeconomic History    Marital status: /Civil Union     Spouse name: Not on file    Number of children: Not on file    Years of education: Not on file    Highest education level: Not on file   Occupational History    Not on file   Tobacco Use    Smoking status: Former     Current packs/day: 0.00     Average packs/day: 1 pack/day for 15.0 years (15.0 ttl pk-yrs)     Types: Cigarettes, Cigars     Start date:      Quit date: 2000     Years since quittin.4    Smokeless tobacco: Never   Vaping Use    Vaping status:  "Never Used   Substance and Sexual Activity    Alcohol use: Yes     Comment: rare    Drug use: Never    Sexual activity: Not on file   Other Topics Concern    Not on file   Social History Narrative    Not on file     Social Drivers of Health     Financial Resource Strain: Not on file   Food Insecurity: Not on file   Transportation Needs: Not on file   Physical Activity: Not on file   Stress: Not on file   Social Connections: Not on file   Intimate Partner Violence: Not on file   Housing Stability: Not on file       Family History[5]      Review of systems:  Please see HPI for positive symptoms.   Constitutional: No fever, no chills, no weight change.   Ocular: No diplopia, no blurred vision, spots/zigzag lines  HEENT:  No sore throat, headache or congestion.   + Chronic sharp neck pain  COR:  No chest pain. No palpitations.   Lungs:  no sob  GI:  no  nausea, no vomiting, no diarrhea, no constipation, no anorexia.   :  No dysuria, frequency, or urgency. No hematuria.    Musculoskeletal:  No joint pain or swelling   Skin:  No rash or itching.   Psychiatric:  no anxiety, no depression.   Endocrine:  No polyuria or polydipsia.    Physical examination:  /83   Pulse 60   Temp 98.2 °F (36.8 °C)   Resp 20   Ht 5' 8\" (1.727 m)   Wt 76.8 kg (169 lb 6.4 oz)   SpO2 98%   BMI 25.76 kg/m²     GENERAL APPEARANCE:  The patient is alert, oriented.    HEENT:  Head is normocephalic.  Pupils are equal and reactive.    NECK:  Supple without lymphadenopathy.   HEART:  Regular rate and rhythm.  LUNGS: No audible wheezing or stridor heard.  ABDOMEN:  Soft, nontender, nondistended   EXTREMITIES:  Without cyanosis, clubbing or edema.     Mental status:  The patient is alert, attentive, and oriented.   Speech is clear and fluent, good repetition, comprehension, and naming.   Cranial nerves:  CN II: Visual fields are full to confrontation.   Fundoscopic exam is normal with sharp discs and no vascular changes.  Pupils are 3 mm and " "briskly reactive to light.   CN III, IV, VI: At primary gaze, there is no eye deviation.   CN V: Facial sensation is intact in all 3 divisions bilaterally.   Corneal responses are intact.  CN VII: Face is symmetric with normal eye closure and smile.  CN VIII: Hearing is normal to rubbing fingers  CN IX, X: Palate elevates symmetrically. Phonation is normal.  CN XI: Head turning and shoulder shrug are intact  CN XII: Tongue is midline with normal movements and no atrophy.  Motor:  There is no pronator drift of out-stretched arms.    Muscle bulk and tone are normal.   Muscle exam  Arm Right Left Leg Right Left   Deltoid 5/5 5/5 Iliopsoas 5/5 5/5   Biceps 5/5 5/5 Quads 5/5 5/5   Triceps 5/5 5/5 Hamstrings 5/5 5/5   Wrist Extension 5/5 5/5 Ankle Dorsi Flexion 5/5 5/5   Wrist Flexion 5/5 5/5 Ankle Plantar Flexion 5/5 5/5        Reflexes    RJ BJ TJ KJ AJ Plantars Nunn's   Right 2+ 2+  2+ 2+ Downgoing Not present   Left 2+ 2+  2+ 2+ Downgoing Not present      Sensory:  Light touch, Temperature, position sense, and vibration sense are intact in fingers and toes.  Coordination:  Rapid alternating movements and fine finger movements are intact.   There is no dysmetria on finger-to-nose and heel-knee-shin.   There are no abnormal or extraneous movements.   Romberg negative.  Gait/Stance:  Deferred gait testing while in ER.    Lab Results   Component Value Date    WBC 5.58 06/18/2025    HGB 15.4 06/18/2025    HCT 45.5 06/18/2025    MCV 91 06/18/2025     06/18/2025     No results found for: \"HGBA1C\"  Lab Results   Component Value Date    ALT 19 06/18/2025    AST 20 06/18/2025    ALKPHOS 80 06/18/2025     Lab Results   Component Value Date    CALCIUM 10.0 06/18/2025    K 4.1 06/18/2025    CO2 23 06/18/2025     06/18/2025    BUN 17 06/18/2025    CREATININE 0.98 06/18/2025         Review of reports and notes reveal:  Independent Interpretation of images or specimens:  X-ray chest 1 view portable  Result Date: " 6/18/2025  No acute cardiopulmonary disease. Workstation performed: [id number]     CT stroke alert brain  Result Date: 6/18/2025  No acute intracranial abnormality. Findings were directly discussed with Lloyd Arias at 10:21 a.m. on 6/18/2025. Workstation performed: [id number]     CTA stroke alert (head/neck)  Result Date: 6/18/2025  Stable at least moderate focal stenosis of the intradural left vertebral artery due to mixed atherosclerotic plaque. No large vessel occlusion, or intracranial aneurysm identified on CT angiogram of the head. No hemodynamically significant stenosis or dissection identified on CT angiogram of the neck. Findings were directly discussed with Lloyd Arias at 10:26 a.m. on 6/18/2025.. Workstation performed: [id number]           Thank you for this consult.    Total time of encounter: 70 Minutes  More than 50% of time was spent in counseling and coordination of care of patient.  Discussed with patient, medical team and attending neurology MD.                   [1]   Past Medical History:  Diagnosis Date    Asthma     Colon polyp     CPAP (continuous positive airway pressure) dependence     DVT (deep venous thrombosis) (HCC)     LLE    Hyperlipidemia     Hypertension     Kidney stone     Pulmonary emboli (HCC)     Sleep apnea    [2]   Past Surgical History:  Procedure Laterality Date    COLONOSCOPY      HEMORRHOID SURGERY N/A 9/29/2020    Procedure: HEMORRHOIDECTOMY EXCISION examination under anesthesia;  Surgeon: Clint Rea MD;  Location: [facility];  Service: General    ROTATOR CUFF REPAIR      SINUS SURGERY     [3]   Allergies  Allergen Reactions    Other Nasal Congestion     Dust mites   [4] No current facility-administered medications for this encounter.    Current Outpatient Medications:     albuterol (VENTOLIN HFA) 90 mcg/act inhaler, Inhale 2 puffs every 6 (six) hours as needed for wheezing (Patient not taking: Reported on 4/30/2024), Disp: 1 Inhaler, Rfl: 11    amLODIPine (NORVASC) 5 mg  tablet, Take 1 tablet by mouth daily (Patient not taking: Reported on 9/5/2024), Disp: , Rfl:     Ascorbic Acid (VITAMIN C PO), Take by mouth, Disp: , Rfl:     CALCIUM ASCORBATE PO, Take by mouth, Disp: , Rfl:     Calcium Polycarbophil (FIBER-CAPS PO), Take by mouth, Disp: , Rfl:     colestipol (COLESTID) 1 g tablet, Take 1 g by mouth 2 (two) times a day, Disp: , Rfl:     cyclobenzaprine (FLEXERIL) 5 mg tablet, 1-2 PO TID PRN (Patient not taking: Reported on 4/30/2024), Disp: 12 tablet, Rfl: 0    cycloSPORINE (RESTASIS) 0.05 % ophthalmic emulsion, Restasis 0.05 % eye drops in a dropperette (Patient not taking: Reported on 9/5/2024), Disp: , Rfl:     methocarbamol (ROBAXIN) 500 mg tablet, Take 1 tablet (500 mg total) by mouth 2 (two) times a day (Patient not taking: Reported on 9/9/2021), Disp: 20 tablet, Rfl: 0    methocarbamol (ROBAXIN) 500 mg tablet, Take 1 tablet (500 mg total) by mouth 2 (two) times a day as needed for muscle spasms, Disp: 15 tablet, Rfl: 0    methylPREDNISolone 4 MG tablet therapy pack, Use as directed on package (Patient not taking: Reported on 4/30/2024), Disp: 21 tablet, Rfl: 0    multivitamin (THERAGRAN) TABS, Take 1 tablet by mouth daily, Disp: , Rfl:     Omega-3 Fatty Acids (FISH OIL) 1,000 mg, Take 1,000 mg by mouth daily, Disp: , Rfl:     oxyCODONE-acetaminophen (PERCOCET) 5-325 mg per tablet, Take 1 tablet by mouth every 4 (four) hours as needed for moderate pain for up to 10 dosesMax Daily Amount: 6 tablets (Patient not taking: Reported on 4/30/2024), Disp: 8 tablet, Rfl: 0    Probiotic Product (PROBIOTIC DAILY PO), Take by mouth, Disp: , Rfl:     rivaroxaban (XARELTO) 10 mg tablet, Take 1 tablet (10 mg total) by mouth daily, Disp: 90 tablet, Rfl: 0    rosuvastatin (CRESTOR) 10 MG tablet, rosuvastatin 10 mg tablet  TAKE 1 TABLET BY MOUTH EVERY DAY AT BEDTIME (Patient not taking: Reported on 4/30/2024), Disp: , Rfl:     elsy (Senna-Time) 8.6 MG tablet, Daily (Patient not taking:  Reported on 9/9/2021), Disp: , Rfl:     sertraline (ZOLOFT) 25 mg tablet, sertraline 25 mg tablet  take 1 tablet by mouth once daily, Disp: , Rfl:     tadalafil (CIALIS) 5 MG tablet, as needed, Disp: , Rfl:     Turmeric (QC TUMERIC COMPLEX PO), Take by mouth, Disp: , Rfl:     VITAMIN D PO, Take by mouth, Disp: , Rfl:   [5]   Family History  Problem Relation Name Age of Onset    No Known Problems Mother      Colon cancer Father  73

## 2025-06-18 NOTE — ASSESSMENT & PLAN NOTE
Patient is a 69-year-old male with onset of left facial numbness, weakness in the legs, nausea and lightheadedness this a.m. upon waking.  Patient was normal around 1 AM when he went to bed, noted around 5 AM when waking he had symptoms of numbness to the left V3 segment of his face, V1 and V2 distributions were without any sensory change.  He noted having some weakness and wobbliness to the legs like they were going to give way.  He had onset of lightheadedness which she has had in the past.  He noted some nausea as well.  Patient stated that he had similar event a couple weeks ago after having dental procedure.  He was off of his Xarelto for approximately 4 days to have tooth removal, he also had caps placed on his 2 additional teeth with noted infection.  He resumed his Xarelto approximately 2 weeks ago but noted similar symptoms during that time he was off of the medication.  Patient reported with regards to his dental issues, he had 3 infected teeth, 1 had to be removed.  He did complete antibiotic therapy for this and did not feel as though he was having any further issues with infection although did report having the chills approximately 1 week ago with no reported fevers.  Patient has chronic pain in his neck with sharp pains also has had lower extremity swelling and lightheadedness with position changes.  Patient per account has poor nutrition, he drinks coffee, tea, sugary fruit drinks with limited oral hydration of water.  Patient wears compression socks due to his swelling and positional lightheadedness.  On neurological exam he had no focal deficits, no visual deficits or speech or swallowing deficits.  Patient remains on Xarelto, will continue his home dosing, continue with statin while under stroke pathway.  MRI of the brain recommended, permissive hypertension no more than 180/110, PT/OT/ST and TTE with shunt study pending.    - Monitor on telemetry  -Neurological assessments per stroke  pathway  -Continues on Xarelto 10 mg daily  -Atorvastatin 40 mg daily  -MRI of the brain  -TTE with shunt study  -Lipid profile and A1c, patient may benefit from vitamin levels as well given poor nutrition.  -Patient may also benefit from orthostatic vital signs due to reported positional lightheaded this a.m. with this event.  - PT/OT/ST

## 2025-06-19 ENCOUNTER — APPOINTMENT (INPATIENT)
Dept: NON INVASIVE DIAGNOSTICS | Facility: HOSPITAL | Age: 70
End: 2025-06-19
Payer: COMMERCIAL

## 2025-06-19 VITALS
SYSTOLIC BLOOD PRESSURE: 134 MMHG | RESPIRATION RATE: 19 BRPM | HEART RATE: 67 BPM | BODY MASS INDEX: 23.64 KG/M2 | TEMPERATURE: 98.6 F | HEIGHT: 68 IN | OXYGEN SATURATION: 95 % | DIASTOLIC BLOOD PRESSURE: 84 MMHG | WEIGHT: 156 LBS

## 2025-06-19 LAB
AORTIC ROOT: 2.9 CM
ATRIAL RATE: 58 BPM
AV LVOT PEAK GRADIENT: 4 MMHG
AV PEAK GRADIENT: 5 MMHG
AV REGURGITATION PRESSURE HALF TIME: 790 MS
BSA FOR ECHO PROCEDURE: 1.84 M2
CHOLEST SERPL-MCNC: 196 MG/DL (ref ?–200)
DOP CALC LVOT AREA: 2.83 CM2
DOP CALC LVOT DIAMETER: 1.9 CM
E WAVE DECELERATION TIME: 200 MS
E/A RATIO: 0.9
EST. AVERAGE GLUCOSE BLD GHB EST-MCNC: 120 MG/DL
FRACTIONAL SHORTENING: 28 (ref 28–44)
HBA1C MFR BLD: 5.8 %
HDLC SERPL-MCNC: 55 MG/DL
INTERVENTRICULAR SEPTUM IN DIASTOLE (PARASTERNAL SHORT AXIS VIEW): 1.1 CM
INTERVENTRICULAR SEPTUM: 1.1 CM (ref 0.6–1.1)
LAAS-AP2: 16.2 CM2
LAAS-AP4: 18.2 CM2
LDLC SERPL CALC-MCNC: 113 MG/DL (ref 0–100)
LEFT ATRIUM AREA SYSTOLE SINGLE PLANE A4C: 17.1 CM2
LEFT ATRIUM SIZE: 3.1 CM
LEFT ATRIUM VOLUME (MOD BIPLANE): 41 ML
LEFT ATRIUM VOLUME INDEX (MOD BIPLANE): 22.3 ML/M2
LEFT INTERNAL DIMENSION IN SYSTOLE: 3.1 CM (ref 2.1–4)
LEFT VENTRICULAR INTERNAL DIMENSION IN DIASTOLE: 4.3 CM (ref 3.5–6)
LEFT VENTRICULAR POSTERIOR WALL IN END DIASTOLE: 1 CM
LEFT VENTRICULAR STROKE VOLUME: 45 ML
LVSV (TEICH): 45 ML
MV E'TISSUE VEL-LAT: 8 CM/S
MV E'TISSUE VEL-SEP: 5 CM/S
MV PEAK A VEL: 0.72 M/S
MV PEAK E VEL: 65 CM/S
MV STENOSIS PRESSURE HALF TIME: 58 MS
MV VALVE AREA P 1/2 METHOD: 3.79
P AXIS: 19 DEGREES
PR INTERVAL: 200 MS
PV PEAK GRADIENT: 2 MMHG
QRS AXIS: 49 DEGREES
QRSD INTERVAL: 92 MS
QT INTERVAL: 410 MS
QTC INTERVAL: 402 MS
RIGHT ATRIUM AREA SYSTOLE A4C: 14.4 CM2
RIGHT VENTRICLE ID DIMENSION: 2.6 CM
SL CV AV DECELERATION TIME RETROGRADE: 2724 MS
SL CV AV PEAK GRADIENT RETROGRADE: 43 MMHG
SL CV LEFT ATRIUM LENGTH A2C: 5.7 CM
SL CV LV EF: 60
SL CV PED ECHO LEFT VENTRICLE DIASTOLIC VOLUME (MOD BIPLANE) 2D: 84 ML
SL CV PED ECHO LEFT VENTRICLE SYSTOLIC VOLUME (MOD BIPLANE) 2D: 39 ML
T WAVE AXIS: 42 DEGREES
TR MAX PG: 14 MMHG
TR PEAK VELOCITY: 1.9 M/S
TRICUSPID ANNULAR PLANE SYSTOLIC EXCURSION: 1.5 CM
TRICUSPID VALVE PEAK REGURGITATION VELOCITY: 1.89 M/S
TRIGL SERPL-MCNC: 141 MG/DL (ref ?–150)
VENTRICULAR RATE: 58 BPM

## 2025-06-19 PROCEDURE — 93010 ELECTROCARDIOGRAM REPORT: CPT | Performed by: INTERNAL MEDICINE

## 2025-06-19 PROCEDURE — 99239 HOSP IP/OBS DSCHRG MGMT >30: CPT | Performed by: INTERNAL MEDICINE

## 2025-06-19 PROCEDURE — 97161 PT EVAL LOW COMPLEX 20 MIN: CPT

## 2025-06-19 PROCEDURE — 93306 TTE W/DOPPLER COMPLETE: CPT

## 2025-06-19 PROCEDURE — 83036 HEMOGLOBIN GLYCOSYLATED A1C: CPT | Performed by: INTERNAL MEDICINE

## 2025-06-19 PROCEDURE — 80061 LIPID PANEL: CPT | Performed by: INTERNAL MEDICINE

## 2025-06-19 PROCEDURE — 93306 TTE W/DOPPLER COMPLETE: CPT | Performed by: INTERNAL MEDICINE

## 2025-06-19 RX ORDER — ATORVASTATIN CALCIUM 40 MG/1
40 TABLET, FILM COATED ORAL
Qty: 30 TABLET | Refills: 0 | Status: SHIPPED | OUTPATIENT
Start: 2025-06-19

## 2025-06-19 RX ORDER — ASPIRIN 81 MG/1
81 TABLET, CHEWABLE ORAL DAILY
Qty: 30 TABLET | Refills: 0 | Status: SHIPPED | OUTPATIENT
Start: 2025-06-20

## 2025-06-19 RX ADMIN — OMEGA-3 FATTY ACIDS CAP 1000 MG 1000 MG: 1000 CAP at 08:00

## 2025-06-19 RX ADMIN — Medication 250 MG: at 08:00

## 2025-06-19 RX ADMIN — OXYCODONE HYDROCHLORIDE AND ACETAMINOPHEN 500 MG: 500 TABLET ORAL at 08:00

## 2025-06-19 RX ADMIN — RIVAROXABAN 10 MG: 20 TABLET, FILM COATED ORAL at 07:55

## 2025-06-19 RX ADMIN — ASPIRIN 81 MG: 81 TABLET, CHEWABLE ORAL at 08:00

## 2025-06-19 RX ADMIN — B-COMPLEX W/ C & FOLIC ACID TAB 1 TABLET: TAB at 08:00

## 2025-06-19 NOTE — UTILIZATION REVIEW
Initial Clinical Review    Admission: Date/Time/Statement:   Admission Orders (From admission, onward)       Ordered        06/18/25 1133  INPATIENT ADMISSION  Once                          Orders Placed This Encounter   Procedures    INPATIENT ADMISSION     Standing Status:   Standing     Number of Occurrences:   1     Level of Care:   Level 2 Stepdown / HOT [14]     Estimated length of stay:   More than 2 Midnights     Certification:   I certify that inpatient services are medically necessary for this patient for a duration of greater than two midnights. See H&P and MD Progress Notes for additional information about the patient's course of treatment.     ED Arrival Information       Expected   -    Arrival   6/18/2025 10:04    Acuity   Emergent              Means of arrival   Walk-In    Escorted by   Self    Service   Hospitalist    Admission type   Emergency              Arrival complaint   Dizziness, Numbness leftside             Chief Complaint   Patient presents with    CVA/TIA-like Symptoms     Patient reports awaking this morning w/ left sided facial numbness, left sides arm numbness/weakness & left eye blurred vision. Last known well: 5am this morning. Patient reports numbness radiating from back of head into neck.        Initial Presentation:   69 yom to ER from home with left facial, left sided blurry vision, left upper extremity sensory deficits and bilateral lower extremity weakness; reports lightheadedness upon onset of symptoms. Hx hypertension, dyslipidemia, DVT/PE on Xarelto. Presents hypertensive, s/s as above with moderately anxious, depressed mood, flat affect. Admission CT brain neg. CTA head & neck: Stable at least moderate focal stenosis of the intradural left vertebral artery due to mixed atherosclerotic plaque. MRI brain: No acute infarct. Mild chronic white matter microangiopathic changes involving both cerebral hemispheres. Labs:  WNL.  Admitted to inpatient status for stroke-like  symptoms. Plan: neuro checks, neuro consult, telemetry, therapies, visual acuity screening  Per neuro:  Recurrent TIA like sxs  HLD  Left vertebral focal stenosis   HTN  H/o DVT/PE on AC  Patient presents with 2 separate episodes of left facial, left sided extremity numbness that's lasted for few hours each. IV TNK was not given due to OOW and Xarelto use. Patient was asked to d/c xarelto for 4 days 2 weeks ago for a dental procedure. Patient is placed on aspirin for now. Continue xarelto at his home dose.   Plan: MRI brain is pending. TTE pending. Allow permissive HTN. Lipitor 40mg and continue upon d/c. PT/OT      Anticipated Length of Stay/Certification Statement:   Patient will be admitted on an Inpatient basis with an anticipated length of stay of greater than 2 midnights.   Justification for Hospital Stay: Strokelike symptoms requiring MRI brain and neurologic evaluation.       ED Treatment-Medication Administration from 06/18/2025 1004 to 06/18/2025 1436         Date/Time Order Dose Route Action     06/18/2025 1020 iohexol (OMNIPAQUE) 350 MG/ML injection (MULTI-DOSE) 85 mL 85 mL Intravenous Given            Scheduled Medications:  ascorbic acid, 500 mg, Oral, Daily  aspirin, 81 mg, Oral, Daily  atorvastatin, 40 mg, Oral, Daily With Dinner  fish oil, 1,000 mg, Oral, Daily  multivitamin stress formula, 1 tablet, Oral, Daily  rivaroxaban, 10 mg, Oral, Daily With Breakfast  saccharomyces boulardii, 250 mg, Oral, BID      Continuous IV Infusions:     PRN Meds:  acetaminophen, 650 mg, Oral, Q6H PRN  albuterol, 2.5 mg, Nebulization, Q6H PRN  hydrALAZINE, 5 mg, Intravenous, Q6H PRN  ondansetron, 4 mg, Intravenous, Q4H PRN      ED Triage Vitals   Temperature Pulse Respirations Blood Pressure SpO2 Pain Score   06/18/25 1015 06/18/25 1011 06/18/25 1011 06/18/25 1010 06/18/25 1011 06/18/25 1441   98.2 °F (36.8 °C) 85 20 (!) 175/81 98 % No Pain     Weight (last 2 days)       Date/Time Weight    06/19/25 0714 70.8 (156)     06/18/25 1441 71 (156.53)    06/18/25 1011 76.8 (169.4)            Vital Signs (last 3 days)       Date/Time Temp Pulse Resp BP MAP (mmHg) SpO2 O2 Device Patient Position - Orthostatic VS [place] Coma Scale Score Pain    06/19/25 0849 -- -- -- -- -- -- -- -- -- No Pain    06/19/25 0800 98.6 °F (37 °C) 67 19 134/84 104 95 % None (Room air) Lying 15 No Pain    06/19/25 0714 -- 55 -- 141/71 -- 93 % -- -- -- --    06/19/25 0700 -- 58 14 -- -- 93 % -- -- -- --    06/19/25 0400 99.3 °F (37.4 °C) 59 17 141/71 100 94 % -- -- -- --    06/19/25 0000 -- 69 12 162/71 102 93 % -- -- -- --    06/18/25 2000 98.6 °F (37 °C) 58 15 137/73 100 94 % None (Room air) Lying -- No Pain    06/18/25 1900 -- 60 15 137/72 99 95 % -- -- -- --    06/18/25 1800 -- 62 16 149/67 96 95 % -- -- -- --    06/18/25 1700 -- 65 24 170/83 119 96 % -- -- -- --    06/18/25 1600 98.7 °F (37.1 °C) 65 18 160/76 110 96 % None (Room air) Lying -- --    06/18/25 1508 -- -- -- -- -- -- -- -- -- No Pain    06/18/25 1500 98.7 °F (37.1 °C) 62 17 147/69 99 98 % None (Room air) Lying 15 --    06/18/25 1441 99 °F (37.2 °C) 60 15 164/71 102 98 % None (Room air) Lying 15 No Pain    06/18/25 1345 -- 52 15 178/81 117 99 % -- -- -- --    06/18/25 1330 -- 51 12 185/87 125 98 % -- -- -- --    06/18/25 1315 -- 53 13 155/78 111 98 % -- -- -- --    06/18/25 1300 -- 53 22 122/81 98 99 % -- -- -- --    06/18/25 1259 -- -- -- -- -- -- None (Room air) -- -- --    06/18/25 1245 -- 59 22 137/84 103 99 % -- -- -- --    06/18/25 1215 -- 58 22 172/84 121 98 % -- -- -- --    06/18/25 1115 -- 55 14 148/79 -- 98 % None (Room air) -- 15 --    06/18/25 1100 -- -- -- -- -- -- -- Sitting 15 --    06/18/25 1055 -- 52 12 -- -- 97 % -- -- -- --    06/18/25 1050 -- 52 12 148/79 -- 95 % -- -- -- --    06/18/25 1045 -- 65 20 148/79 -- 97 % None (Room air) -- 15 --    06/18/25 1040 -- 56 14 149/81 -- 95 % -- -- -- --    06/18/25 1035 -- 57 15 -- -- 96 % -- -- -- --    06/18/25 1030 -- 60 20 155/83 -- 98  % None (Room air) -- 15 --    06/18/25 1020 -- -- -- 154/74 -- -- -- -- -- --    06/18/25 1015 98.2 °F (36.8 °C) -- 20 154/74 -- 99 % None (Room air) -- 15 --    06/18/25 1011 -- 85 20 175/81 -- 98 % None (Room air) Sitting -- --    06/18/25 1010 -- -- -- 175/81 -- -- -- -- -- --              Pertinent Labs/Diagnostic Test Results:   Radiology:  MRI brain wo contrast   Final Interpretation by Mauricio Villalobos MD (06/18 1939)      No acute infarct. Mild chronic white matter microangiopathic changes involving both cerebral hemispheres.      Workstation performed: [id number]         X-ray chest 1 view portable   Final Interpretation by Sachin Cheek MD (06/18 1035)      No acute cardiopulmonary disease.            Workstation performed: [id number]         CTA stroke alert (head/neck)   Final Interpretation by Mauricio Villalobos MD (06/18 1034)   Stable at least moderate focal stenosis of the intradural left vertebral artery due to mixed atherosclerotic plaque. No large vessel occlusion, or intracranial aneurysm identified on CT angiogram of the head.      No hemodynamically significant stenosis or dissection identified on CT angiogram of the neck.         Findings were directly discussed with Lloyd Arias at 10:26 a.m. on 6/18/2025..      Workstation performed: [id number]         CT stroke alert brain   Final Interpretation by Mauricio Villalobos MD (06/18 1035)      No acute intracranial abnormality.      Findings were directly discussed with Lloyd Arias at 10:21 a.m. on 6/18/2025.      Workstation performed: [id number]           Cardiology:  Echo complete w/ contrast if indicated    by AJ Romo (06/19 0749)      ECG 12 lead    by Interface, Ris Results In (06/18 1035)        GI:  No orders to display           Results from last 7 days   Lab Units 06/18/25  1016   WBC Thousand/uL 5.58   HEMOGLOBIN g/dL 15.4   HEMATOCRIT % 45.5   PLATELETS Thousands/uL 173         Results from last 7 days   Lab Units 06/18/25  1016  "  SODIUM mmol/L 136   POTASSIUM mmol/L 4.1   CHLORIDE mmol/L 103   CO2 mmol/L 23   ANION GAP mmol/L 10   BUN mg/dL 17   CREATININE mg/dL 0.98   EGFR ml/min/1.73sq m 79   CALCIUM mg/dL 10.0   MAGNESIUM mg/dL 2.1     Results from last 7 days   Lab Units 06/18/25  1016   AST U/L 20   ALT U/L 19   ALK PHOS U/L 80   TOTAL PROTEIN g/dL 6.7   ALBUMIN g/dL 4.1   TOTAL BILIRUBIN mg/dL 0.36     Results from last 7 days   Lab Units 06/18/25  1010   POC GLUCOSE mg/dl 92     Results from last 7 days   Lab Units 06/18/25  1016   GLUCOSE RANDOM mg/dL 82         Results from last 7 days   Lab Units 06/19/25  0536   HEMOGLOBIN A1C % 5.8*   EAG mg/dl 120     No results found for: \"BETA-HYDROXYBUTYRATE\"                   Results from last 7 days   Lab Units 06/18/25  1504 06/18/25  1038   HS TNI 0HR ng/L  --  <2   HS TNI 4HR ng/L 3  --    HSTNI D4 ng/L >1  --          Results from last 7 days   Lab Units 06/18/25  1016   PROTIME seconds 13.3   INR  0.96   PTT seconds 27                                                             Results from last 7 days   Lab Units 06/18/25  1557   CLARITY UA  Clear   COLOR UA  Light Yellow   SPEC GRAV UA  1.015   PH UA  7.0   GLUCOSE UA mg/dl Negative   KETONES UA mg/dl Negative   BLOOD UA  Negative   PROTEIN UA mg/dl Negative   NITRITE UA  Negative   BILIRUBIN UA  Negative   UROBILINOGEN UA (BE) mg/dl <2.0   LEUKOCYTES UA  Negative                                                   Past Medical History[1]  Present on Admission:   Essential hypertension   Hyperlipidemia   Obstructive sleep apnea      Admitting Diagnosis: TIA (transient ischemic attack) [G45.9]  CVA (cerebral vascular accident) (HCC) [I63.9]  Left facial numbness [R20.0]  Age/Sex: 69 y.o. male    Network Utilization Review Department  ATTENTION: Please call with any questions or concerns to [phone] and carefully listen to the prompts so that you are directed to the right person. All voicemails are confidential.   For Discharge " needs, contact Care Management DC Support Team at [phone] opt. [phone]  Send all requests for admission clinical reviews, approved or denied determinations and any other requests to dedicated fax number below belonging to the campus where the patient is receiving treatment. List of dedicated fax numbers for the Facilities:  FACILITY NAME UR FAX NUMBER   ADMISSION DENIALS (Administrative/Medical Necessity) [phone]   DISCHARGE SUPPORT TEAM (NETWORK) [phone]   PARENT CHILD HEALTH (Maternity/NICU/Pediatrics) [phone]   [facility] [phone]   [facility] [phone]   [facility] [phone]   [facility] [phone]   [facility] [phone]   [facility] [phone]   [facility] [phone]   [facility] [phone]   [facility] [phone]   [facility] [phone]   [facility] [phone]   [facility] [phone]              [1]   Past Medical History:  Diagnosis Date    Asthma     Colon polyp     CPAP (continuous positive airway pressure) dependence     DVT (deep venous thrombosis) (HCC)     LLE    Hyperlipidemia     Hypertension     Kidney stone     Pulmonary emboli (HCC)     Sleep apnea

## 2025-06-19 NOTE — DISCHARGE SUMMARY
Discharge Summary - Hospitalist   Name: Augusto Momin 69 y.o. male I MRN: [id number]  Unit/Bed#: ICU 08 I Date of Admission: 6/18/2025   Date of Service: 6/19/2025 I Hospital Day: 1     Assessment & Plan  Stroke-like symptoms  6/18: Presented with transient episodes of left-sided facial numbness with upper/lower extremity numbness/weakness, now resolved  CT of head report, personally reviewed, noting no acute intracranial abnormalities, and subsequent CT angiogram of head/neck report, personally reviewed, noting moderate left vertebral artery stenosis without large vessel occlusions or aneurysms  Continue intensity statin and initiate ASA daily - of note, chronically on Xarelto for history of venous thromboembolism  Check updated lipid panel and A1c  Await echocardiogram and MRI brain  Neurology consultation  Serial neurochecks  PT/OT and speech therapy evaluations    6/19: MRI negative for acute infarcts, however, did reveal chronic microangiopathic changes.  Discussed with neurology and will continue low-dose daily ASA along with high intensity statin, with continuation of chronic Xarelto.  Fasting LDL of 113 and A1c of 5.8 noted.  Lifestyle/diet modifications.  Likelihood of TIA.  Outpatient follow-up with PCP.  Plan for discharge home today.  Essential hypertension  Diet controlled  Hyperlipidemia  Continue statin/fish oil  Asthma  Stable/asymptomatic  PRN Albuterol  History of venous thromboembolism  Continue Xarelto  Obstructive sleep apnea  CPAP QHS             Discharging Physician / Practitioner: Tianna Zepeda MD  PCP: Gerard Hollins MD    Admission Date:   Admission Orders (From admission, onward)       Ordered        06/18/25 1133  INPATIENT ADMISSION  Once                          Discharge Date: 06/19/25      Reason for Admission:     Left facial numbness with left-sided weakness      Discharge Diagnoses:     Principal Problem:    Stroke-like symptoms - Transient ischemic attack (TIA)    Active  "Problems:    Essential hypertension    Hyperlipidemia    Obstructive sleep apnea    Asthma    History of venous thromboembolism      Consultations During Hospital Stay:   Neurology      Condition at Discharge: fair       Discharge Day Visit / Exam:     Vitals: Blood Pressure: 134/84 (06/19/25 0800)  Pulse: 67 (06/19/25 0800)  Temperature: 98.6 °F (37 °C) (06/19/25 0800)  Temp Source: Tympanic (06/19/25 0800)  Respirations: 19 (06/19/25 0800)  Height: 5' 8\" (172.7 cm) (06/19/25 0714)  Weight - Scale: 70.8 kg (156 lb) (06/19/25 0714)  SpO2: 95 % (06/19/25 0800)      Physical exam - I had a face-to-face encounter with the patient on day of discharge.      Discussion with Patient and/or Family:  The patient has been advised to return to the ER immediately if any symptoms recur or worsen.       Discharge Instructions/Information to Patient and/or Family:   See after visit summary for information provided to patient and/or family.        Provisions for Follow-up Care:   See after visit summary for information related to follow-up care and any pertinent home health orders.        Disposition:   Home      Discharge Medications:   See after visit summary for reconciled discharge medications provided to patient and/or family.        Discharge Statement:   I spent 38 minutes discharging the patient. This time was spent on the day of discharge. I had direct contact with the patient on the day of discharge. Greater than 50% of the total time was spent examining patient, answering all patient questions, arranging and discussing plan of care with patient as well as directly providing post-discharge instructions.  Additional time then spent on discharge activities.           DENNIS BADILLO MD    Hospitalist - [facility] Internal Medicine        ** Please Note:  Documentation is constructed using a voice recognition dictation system.  An occasional wrong word/phrase or “sound-a-like” substitution may have been picked up by dictation " device due to the inherent limitations of voice recognition software.  Read the chart carefully and recognize, using reasonable context, where substitutions may have occurred.**

## 2025-06-19 NOTE — NURSING NOTE
Discharge instructions given to pt and wife and they verbalized understanding of them. IV dcd. Pt with no complaints and no distress noted. All belongings sent home with pt.

## 2025-06-19 NOTE — CASE MANAGEMENT
Case Management Assessment & Discharge Planning Note    Patient name Augusto Momin  Location ICU 08/ICU 08 MRN [id number]  : 1955 Date 2025       Current Admission Date: 2025  Current Admission Diagnosis:Stroke-like symptoms   Patient Active Problem List    Diagnosis Date Noted    Stroke-like symptoms 2025    Asthma 2025    History of venous thromboembolism 2025    History of DVT (deep vein thrombosis) 2024    History of pulmonary embolism 2024    Abnormal EKG 2024    Factor V Leiden carrier (HCC) 2021    Hemorrhoids 2020    Rectal pain 2020    Neuropathy 2020    Anticoagulant long-term use 2020    History of colon polyps 2020    Obstructive sleep apnea 2020    Dyspnea on exertion 2020    Swelling of lower extremity 2020    Pulmonary emboli (HCC) 2019    Acute deep vein thrombosis (DVT) of left lower extremity (HCC) 2019    Essential hypertension 2019    Hyperlipidemia 2019    Atypical chest pain 2019    Overweight with body mass index (BMI) 25.0-29.9 2016      LOS (days): 1  Geometric Mean LOS (GMLOS) (days): 2  Days to GMLOS:1.1     OBJECTIVE:    Risk of Unplanned Readmission Score: 10.44         Current admission status: Inpatient  Referral Reason: Stroke    Preferred Pharmacy:   [id number] - [address]  [address] [facility] [address]  Phone: [phone] Fax: [phone]    [facility] - [place], [place] - [address]  [address]  [facility] [address]  Phone: [phone] Fax: [phone]    Primary Care Provider: Gerard Hollins MD    Primary Insurance: BLUE CROSS  Secondary Insurance:     ASSESSMENT:  Active Health Care Proxies    There are no active Health Care Proxies on file.  Readmission Root Cause  30 Day Readmission: No    Patient Information  Admitted from:: Home  Mental Status: Alert  During  Assessment patient was accompanied by: Spouse  Assessment information provided by:: Patient, Spouse  Primary Caregiver: Self  Support Systems: Spouse/significant other  County of Residence: [place]  What [place] you [place]  Home entry access options. Select all that apply.: Stairs  Number of steps to enter home.: 2  Type of Current Residence: 2 story home  Upon entering residence, is there a bedroom on the main floor (no further steps)?: No  A bedroom is located on the following floor levels of residence (select all that apply):: 2nd Floor  Upon entering residence, is there a bathroom on the main floor (no further steps)?: Yes  Living Arrangements: Lives w/ Spouse/significant other    Activities of Daily Living Prior to Admission  Functional Status: Independent  Completes ADLs independently?: Yes  Ambulates independently?: Yes  Does patient use assisted devices?: No  Does patient currently own DME?: No  Does patient have a history of Outpatient Therapy (PT/OT)?: Yes  Does the patient have a history of Short-Term Rehab?: No  Does patient have a history of HHC?: No  Does patient currently have HHC?: No     Patient Information Continued  Income Source: Employed  Does patient have prescription coverage?: Yes  Can the patient afford their medications and any related supplies (such as glucometers or test strips)?: Yes  Does patient receive dialysis treatments?: No     Means of Transportation  Means of Transport to Appts:: Drives Self    DISCHARGE DETAILS:    Discharge planning discussed with:: Patient, Spouse  Freedom of Choice: Yes  Comments - Freedom of Choice: SW met bedside with patient and spouse to introduce role, complete assessment, and discuss discharge plan. SW reviewed therapy evaluation with recommendation to discharge home with no rehab needs. Patient/spouse verbalized agreement and stated they are ready to discharge home. No questions, concerns, or anticipated discharge needs SW can  assist with at this time.  CM contacted family/caregiver?: Yes  Were Treatment Team discharge recommendations reviewed with patient/caregiver?: Yes  Did patient/caregiver verbalize understanding of patient care needs?: Yes  Were patient/caregiver advised of the risks associated with not following Treatment Team discharge recommendations?: Yes    Contacts  Patient Contacts: Luana Momin (spouse)  Relationship to Patient:: Family  Contact Method: In Person  Reason/Outcome: Continuity of Care, Emergency Contact, Discharge Planning    Requested Home Health Care         Is the patient interested in HHC at discharge?: No    DME Referral Provided  Referral made for DME?: No    Treatment Team Recommendation: Home  Expected Discharge Disposition: Home or Self Care     Transport at Discharge : Family

## 2025-06-19 NOTE — ASSESSMENT & PLAN NOTE
6/18: Presented with transient episodes of left-sided facial numbness with upper/lower extremity numbness/weakness, now resolved  CT of head report, personally reviewed, noting no acute intracranial abnormalities, and subsequent CT angiogram of head/neck report, personally reviewed, noting moderate left vertebral artery stenosis without large vessel occlusions or aneurysms  Continue intensity statin and initiate ASA daily - of note, chronically on Xarelto for history of venous thromboembolism  Check updated lipid panel and A1c  Await echocardiogram and MRI brain  Neurology consultation  Serial neurochecks  PT/OT and speech therapy evaluations    6/19: MRI negative for acute infarcts, however, did reveal chronic microangiopathic changes.  Discussed with neurology and will continue low-dose daily ASA along with high intensity statin, with continuation of chronic Xarelto.  Fasting LDL of 113 and A1c of 5.8 noted.  Lifestyle/diet modifications.  Likelihood of TIA.  Outpatient follow-up with PCP.  Plan for discharge home today.

## 2025-06-19 NOTE — OCCUPATIONAL THERAPY NOTE
OT EVALUATION       06/19/25 1052   Note Type   Note type Screen   Additional Comments Patient is independent with ADLS and mobility per PT and symptoms have resolved.  No skilled OT needs at this time.   Discharge Recommendation   Rehab Resource Intensity Level, OT No post-acute rehabilitation needs   Licensure   [place] License Number  Dari Best MS OTR/L [id number]

## 2025-06-19 NOTE — PHYSICAL THERAPY NOTE
PT EVALUATION    Patient is not in need of further skilled IP PT services as patient is fully independent, no impairments noted and symptoms resolved per patient.  Will DC patient from skilled IP PT caseload.  If any acute need arises please re-consult.    NAME:  Augusto Momin  AGE:   69 y.o.  Mrn:   [id number]  Length Of Stay: 1    ADMIT DX:  TIA (transient ischemic attack) [G45.9]  CVA (cerebral vascular accident) (HCC) [I63.9]  Left facial numbness [R20.0]    Past Medical History[1]  Past Surgical History[2]     06/19/25 0849   PT Last Visit   PT Visit Date 06/19/25   Note Type   Note type Evaluation   Pain Assessment   Pain Assessment Tool 0-10   Pain Score No Pain   Restrictions/Precautions   Other Precautions Telemetry;Multiple lines  (Pt seen in the ICU)   Home Living   Type of Home House   Home Layout Two level;Bed/bath upstairs;Stairs to enter without rails  (Full bath also downstairs;2 PABLITO)   Bathroom Shower/Tub Walk-in shower  (walk-in upstairs;tub shower downstairs)   Bathroom Toilet Standard   Bathroom Equipment Grab bars in shower;Built-in shower seat  (bathroom DME in upstairs bathroom, not downstairs bathroom)   Bathroom Accessibility Accessible   Home Equipment   (no DME)   Prior Function   Level of [place] Independent with ADLs;Independent with functional mobility;Independent with IADLS   Lives With Spouse   Receives Help From Family   IADLs Independent with driving;Independent with meal prep;Independent with medication management   Falls in the last 6 months 0  (Denies)   Vocational Full time employment  (long-term)   Comments Patient is fully independent without an assistive device prior to admission   General   Additional Pertinent History Patient is admitted with transient left-sided facial numbness with UE/LE extremity numbness/weakness.  All symptoms resolved at time of PT evaluation.  MRI (-) for acute infarct   Family/Caregiver Present Yes  (Patient's spouse)   Cognition   Overall  "Cognitive Status WFL   Arousal/Participation Cooperative   Orientation Level Oriented X4   Memory Within functional limits   Following Commands Follows all commands and directions without difficulty   Comments At least 2 patient identifiers including name and date of birth   Subjective   Subjective \"This has happened before I would like to know what is going on\"   RUE Assessment   RUE Assessment WFL  (5/5)   LUE Assessment   LUE Assessment WFL  (5/5)   RLE Assessment   RLE Assessment WFL  (5/5)   LLE Assessment   LLE Assessment WFL  (5/5)   Vision-Basic Assessment   Current Vision Wears glasses all the time   Patient Visual Report Other (Comment)  (Subjectively reports no visual changes)   Coordination   Movements are Fluid and Coordinated 1   Sensation WFL   Finger to Nose & Finger to Finger  Intact   Heel to Shin Intact   Rapid Alternating Movements Intact   Light Touch   RLE Light Touch Grossly intact   LLE Light Touch Grossly intact   Proprioception   RLE Proprioception Grossly intact   LLE Proprioception Grossly Intact   Bed Mobility   Supine to Sit 7  Independent   Sit to Supine 7  Independent   Transfers   Sit to Stand 7  Independent   Stand to Sit 7  Independent   Ambulation/Elevation   Gait pattern WNL   Gait Assistance 7  Independent   Assistive Device None   Distance 50 feet with change in direction   Balance   Static Sitting Normal   Dynamic Sitting Normal   Static Standing Normal   Dynamic Standing Normal   Ambulatory Normal   Activity Tolerance   Activity Tolerance Patient tolerated treatment well   Medical Staff Made Aware CM;OT   Nurse Made Aware SHAHID Harrison   Assessment   Problem List   (None)   Assessment Patient seen for Physical Therapy evaluation. Patient admitted with Stroke-like symptoms.  Comorbidities affecting patient's physical performance include: HTN, HLD, NEELIMA, asthma, PE, DVT, atypical chest pain, HERNANDEZ, swelling LE, neuropathy.  Personal factors affecting patient at time of initial " "evaluation include: lives in two story house and stairs to enter home. Prior to admission, patient was independent with functional mobility without assistive device, independent with ADLS, independent with IADLS, living with spouse in a two level home with 2 steps to enter, ambulating household distance, ambulating community distances, and works full time.  Please find objective findings from Physical Therapy assessment regarding body systems outlined above with impairments and limitations including none.  The Barthel Index was used as a functional outcome tool presenting with a score of Barthel Index Score: 100 today indicating no limitations of functional mobility and ADLS.  Patient's clinical presentation is currently stable.  Patient is not in need of further skilled IP PT services as patient is fully independent, no impairments noted and symptoms resolved per patient.  Will DC patient from skilled IP PT caseload. As demonstrated by objective findings, the assigned level of complexity for this evaluation is low.    he patient's AM-PAC Basic Mobility Inpatient Short Form Raw Score is 24. A Raw score of greater than 16 suggests the patient may benefit from discharge to home. Please also refer to the recommendation of the Physical Therapist for safe discharge planning.   Goals   Patient Goals \"to figure out what's going on\"   [facility] Expiration Date 06/23/25   Short Term Goal #1 Patient will: Ambulate community distances without assistive device independently w/o LOB to facilitate return and engagement w/ previous living environment, Navigate flight of stairs independently with unilateral handrail to either improve independence w/ entering home and/or so patient can fully access living areas in home, and pt will return to FT employment   Plan   Treatment/Interventions ADL retraining;Functional transfer training;LE strengthening/ROM;Elevations;Therapeutic exercise;Endurance training;Patient/family training;Equipment " eval/education;Bed mobility;Gait training;Spoke to nursing;Spoke to case management;OT;Family   PT Frequency Other (Comment)  (PT evaluation)   Discharge Recommendation   Rehab Resource Intensity Level, PT No post-acute rehabilitation needs   Equipment Recommended   (none)   AM-PAC Basic Mobility Inpatient   Turning in Flat Bed Without Bedrails 4   Lying on Back to Sitting on Edge of Flat Bed Without Bedrails 4   Moving Bed to Chair 4   Standing Up From Chair Using Arms 4   Walk in Room 4   Climb 3-5 Stairs With Railing 4   Basic Mobility Inpatient Raw Score 24   Basic Mobility Standardized Score 57.68   [facility] Highest Level Of Mobility   JH-HLM Goal 8: Walk 250 feet or more   JH-HLM Achieved 8: Walk 250 feet ot more   Modified Elizabeth Scale   Modified Elizabeth Scale 0   Barthel Index   Feeding 10   Bathing 5   Grooming Score 5   Dressing Score 10   Bladder Score 10   Bowels Score 10   Toilet Use Score 10   Transfers (Bed/Chair) Score 15   Mobility (Level Surface) Score 15   Stairs Score 10   Barthel Index Score 100   End of Consult   Patient Position at End of Consult Supine;All needs within reach   Licensure   [place] License Number  [place] LOAIZA [place], PT [id number]   Portions of the documentation may have been created using voice recognition software. Occasional wrong word or sound alike substitutions may have occurred due to the inherent limitation of the voice recognition software. Read the chart carefully and recognize, using context, where substitutions have occurred.          [1]   Past Medical History:  Diagnosis Date    Asthma     Colon polyp     CPAP (continuous positive airway pressure) dependence     DVT (deep venous thrombosis) (HCC)     LLE    Hyperlipidemia     Hypertension     Kidney stone     Pulmonary emboli (HCC)     Sleep apnea    [2]   Past Surgical History:  Procedure Laterality Date    COLONOSCOPY      HEMORRHOID SURGERY N/A 9/29/2020    Procedure: HEMORRHOIDECTOMY EXCISION examination under  anesthesia;  Surgeon: Clint Rea MD;  Location: [facility];  Service: General    ROTATOR CUFF REPAIR      SINUS SURGERY

## 2025-06-19 NOTE — ASSESSMENT & PLAN NOTE
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 Stroke alert activated in ED. NIHSS 1 and /81 on admission. Patient was not an IV thrombolytics candidate due to being on Xarelto and also being outside of the appropriate time window. No IR target was identified on imaging.     Neuroimaging   6/18/2025 CTH:   No acute intracranial abnormality.  6/18/2025 CTA head and neck with and without contrast:   Stable at least moderate focal stenosis of the intradural left vertebral artery due to mixed atherosclerotic plaque. No large vessel occlusion, intracranial aneurysm identified on CT angiogram of the head. No hemodynamically significant stenosis or dissection identified on CT angiogram of the neck.   6/18/2025 MRI brain without contrast:   No acute infarct.   Mild chronic white matter microangiopathic changes involving both cerebral hemispheres.       Plan:   - Continue on acute ischemic stroke pathway.   - MRI brain without contrast pending.   - Echocardiogram pending.   - Hemoglobin A1c pending.  - Lipid panel reviewed, total cholesterol 196, triglycerides 141, HDL 55, .  - Continue on home dose of Xarelto 10 mg QD.   - Atorvastatin 40 mg QPM.  - Goal normotension.  - Goal normothermia and euglycemia.   - PT/OT  - Stroke education  - Monitor exam and notify with changes.

## 2025-06-20 ENCOUNTER — TELEPHONE (OUTPATIENT)
Age: 70
End: 2025-06-20

## 2025-06-20 LAB — MRSA NOSE QL CULT: NORMAL

## 2025-06-20 NOTE — UTILIZATION REVIEW
NOTIFICATION OF ADMISSION DISCHARGE   This is a Notification of Discharge from [facility]. Please be advised that this patient has been discharge from our facility. Below you will find the admission and discharge date and time including the patient’s disposition.   UTILIZATION REVIEW CONTACT:  Utilization Review Assistants  Network Utilization Review Department  Phone: [phone] x carefully listen to the prompts. All voicemails are confidential.  Email: [email or web address]     ADMISSION INFORMATION  PRESENTATION DATE: 6/18/2025 10:06 AM  OBERVATION ADMISSION DATE: N/A  INPATIENT ADMISSION DATE: 6/18/25 11:33 AM   DISCHARGE DATE: 6/19/2025  1:55 PM   DISPOSITION:Home/Self Care    Network Utilization Review Department  ATTENTION: Please call with any questions or concerns to [phone] and carefully listen to the prompts so that you are directed to the right person. All voicemails are confidential.   For Discharge needs, contact Care Management DC Support Team at [phone] opt. 2  Send all requests for admission clinical reviews, approved or denied determinations and any other requests to dedicated fax number below belonging to the campus where the patient is receiving treatment. List of dedicated fax numbers for the Facilities:  FACILITY NAME UR FAX NUMBER   ADMISSION DENIALS (Administrative/Medical Necessity) [phone]   DISCHARGE SUPPORT TEAM ([facility]) [phone]   PARENT CHILD HEALTH (Maternity/NICU/Pediatrics) [phone]   [facility] [phone]   [facility] [phone]   [facility] [phone]   [facility] [phone]   [facility] [phone]   [facility] [phone]   [facility] [phone]   [facility] [phone]   [facility]  [facility] [phone]   [facility] [phone]   [facility] [phone]   [facility] [phone]

## 2025-06-20 NOTE — TELEPHONE ENCOUNTER
1ST ATTEMPT,     Called pt no answer, LMOM.    Thank you,   Kailey         U - [facility] FAHEEM - Stroke-like symptoms     D/C - HOME - 06/19/2025    Laureen Ann PA-C  [facility] Discharge  Augusto Momin will need follow-up in 8-10 weeks with neurovascular team for TIA in 60 minute appointment. They will not require outpatient neurological testing

## 2025-06-23 NOTE — UTILIZATION REVIEW
NOTIFICATION OF ADMISSION DISCHARGE   This is a Notification of Discharge from [facility]. Please be advised that this patient has been discharge from our facility. Below you will find the admission and discharge date and time including the patient’s disposition.   UTILIZATION REVIEW CONTACT:  Utilization Review Assistants  Network Utilization Review Department  Phone: [phone] x carefully listen to the prompts. All voicemails are confidential.  Email: [email or web address]     ADMISSION INFORMATION  PRESENTATION DATE: 6/18/2025 10:06 AM  OBERVATION ADMISSION DATE: N/A  INPATIENT ADMISSION DATE: 6/18/25 11:33 AM   DISCHARGE DATE: 6/19/2025  1:55 PM   DISPOSITION:Home/Self Care    Network Utilization Review Department  ATTENTION: Please call with any questions or concerns to [phone] and carefully listen to the prompts so that you are directed to the right person. All voicemails are confidential.   For Discharge needs, contact Care Management DC Support Team at [phone] opt. 2  Send all requests for admission clinical reviews, approved or denied determinations and any other requests to dedicated fax number below belonging to the campus where the patient is receiving treatment. List of dedicated fax numbers for the Facilities:  FACILITY NAME UR FAX NUMBER   ADMISSION DENIALS (Administrative/Medical Necessity) [phone]   DISCHARGE SUPPORT TEAM ([facility]) [phone]   PARENT CHILD HEALTH (Maternity/NICU/Pediatrics) [phone]   [facility] [phone]   [facility] [phone]   [facility] [phone]   [facility] [phone]   [facility] [phone]   [facility] [phone]   [facility] [phone]   [facility] [phone]   [facility]  [facility] [phone]   [facility] [phone]   [facility] [phone]   [facility] [phone]

## 2025-07-31 ENCOUNTER — OFFICE VISIT (OUTPATIENT)
Dept: NEUROLOGY | Facility: CLINIC | Age: 70
End: 2025-07-31
Payer: COMMERCIAL

## 2025-07-31 VITALS
HEIGHT: 68 IN | HEART RATE: 76 BPM | BODY MASS INDEX: 25.46 KG/M2 | SYSTOLIC BLOOD PRESSURE: 106 MMHG | WEIGHT: 168 LBS | DIASTOLIC BLOOD PRESSURE: 62 MMHG

## 2025-07-31 DIAGNOSIS — R29.818 TRANSIENT NEUROLOGICAL SYMPTOMS: Primary | ICD-10-CM

## 2025-07-31 PROCEDURE — 99215 OFFICE O/P EST HI 40 MIN: CPT | Performed by: STUDENT IN AN ORGANIZED HEALTH CARE EDUCATION/TRAINING PROGRAM

## 2025-07-31 RX ORDER — FLUTICASONE PROPIONATE 50 MCG
SPRAY, SUSPENSION (ML) NASAL
COMMUNITY

## 2025-07-31 RX ORDER — ALPRAZOLAM 0.5 MG
1 TABLET ORAL DAILY PRN
COMMUNITY

## 2025-07-31 RX ORDER — LOSARTAN POTASSIUM 25 MG/1
TABLET ORAL
COMMUNITY
Start: 2025-07-14

## 2025-08-19 ENCOUNTER — HOSPITAL ENCOUNTER (OUTPATIENT)
Dept: NEUROLOGY | Facility: CLINIC | Age: 70
Discharge: HOME | End: 2025-08-19
Payer: COMMERCIAL

## 2025-08-19 DIAGNOSIS — R29.818 TRANSIENT NEUROLOGICAL SYMPTOMS: ICD-10-CM

## 2025-08-19 PROCEDURE — 95816 EEG AWAKE AND DROWSY: CPT

## 2025-08-19 PROCEDURE — 95816 EEG AWAKE AND DROWSY: CPT | Performed by: STUDENT IN AN ORGANIZED HEALTH CARE EDUCATION/TRAINING PROGRAM

## 2025-08-25 PROBLEM — Q27.8 ABERRANT RIGHT SUBCLAVIAN ARTERY: Status: ACTIVE | Noted: 2025-08-25

## 2025-08-25 PROBLEM — I65.02 VERTEBRAL ARTERY STENOSIS, ASYMPTOMATIC, LEFT: Status: ACTIVE | Noted: 2025-08-25

## (undated) DEVICE — LIGHT HANDLE COVER SLEEVE DISP BLUE STELLAR

## (undated) DEVICE — BETHLEHEM UNIVERSAL MINOR GEN: Brand: CARDINAL HEALTH

## (undated) DEVICE — INTENDED FOR TISSUE SEPARATION, AND OTHER PROCEDURES THAT REQUIRE A SHARP SURGICAL BLADE TO PUNCTURE OR CUT.: Brand: BARD-PARKER SAFETY BLADES SIZE 10, STERILE

## (undated) DEVICE — TUBING SUCTION 5MM X 12 FT

## (undated) DEVICE — DRAPE EQUIPMENT RF WAND

## (undated) DEVICE — PLUMEPEN PRO 10FT

## (undated) DEVICE — ABDOMINAL PAD: Brand: DERMACEA

## (undated) DEVICE — STRETCH BANDAGE: Brand: CURITY

## (undated) DEVICE — SPONGE STICK WITH PVP-I: Brand: KENDALL

## (undated) DEVICE — GLOVE SRG BIOGEL 7.5

## (undated) DEVICE — DRAPE ADOLESCENT LAPAROTOMY

## (undated) DEVICE — SUT VICRYL 4-0 SH 27 IN J415H

## (undated) DEVICE — SURGIFOAM 8.5 X 12.5

## (undated) DEVICE — KERLIX BANDAGE ROLL: Brand: KERLIX

## (undated) DEVICE — 3M™ MICROFOAM™ SURGICAL TAPE 4 ROLLS/CARTON 6 CARTONS/CASE 1528-3: Brand: 3M™ MICROFOAM™

## (undated) DEVICE — POOLE SUCTION HANDLE: Brand: CARDINAL HEALTH

## (undated) DEVICE — VIAL DECANTER

## (undated) DEVICE — INTENDED FOR TISSUE SEPARATION, AND OTHER PROCEDURES THAT REQUIRE A SHARP SURGICAL BLADE TO PUNCTURE OR CUT.: Brand: BARD-PARKER SAFETY BLADES SIZE 15, STERILE

## (undated) DEVICE — GLOVE INDICATOR PI UNDERGLOVE SZ 8 BLUE